# Patient Record
Sex: FEMALE | Race: OTHER | Employment: OTHER | URBAN - METROPOLITAN AREA
[De-identification: names, ages, dates, MRNs, and addresses within clinical notes are randomized per-mention and may not be internally consistent; named-entity substitution may affect disease eponyms.]

---

## 2017-01-11 ENCOUNTER — LAB ENCOUNTER (OUTPATIENT)
Dept: LAB | Age: 67
End: 2017-01-11
Attending: INTERNAL MEDICINE
Payer: MEDICARE

## 2017-01-11 DIAGNOSIS — Z00.00 ENCOUNTER FOR ANNUAL HEALTH EXAMINATION: ICD-10-CM

## 2017-01-11 LAB
ALBUMIN SERPL-MCNC: 3.5 G/DL (ref 3.5–4.8)
ALP LIVER SERPL-CCNC: 102 U/L (ref 50–130)
ALT SERPL-CCNC: 22 U/L (ref 14–54)
AST SERPL-CCNC: 17 U/L (ref 15–41)
BASOPHILS # BLD AUTO: 0.02 X10(3) UL (ref 0–0.1)
BASOPHILS NFR BLD AUTO: 0.3 %
BILIRUB SERPL-MCNC: 0.4 MG/DL (ref 0.1–2)
BUN BLD-MCNC: 18 MG/DL (ref 8–20)
CALCIUM BLD-MCNC: 9 MG/DL (ref 8.3–10.3)
CHLORIDE: 105 MMOL/L (ref 101–111)
CHOLEST SMN-MCNC: 220 MG/DL (ref ?–200)
CO2: 29 MMOL/L (ref 22–32)
CREAT BLD-MCNC: 0.84 MG/DL (ref 0.55–1.02)
EOSINOPHIL # BLD AUTO: 0.4 X10(3) UL (ref 0–0.3)
EOSINOPHIL NFR BLD AUTO: 5.1 %
ERYTHROCYTE [DISTWIDTH] IN BLOOD BY AUTOMATED COUNT: 14.3 % (ref 11.5–16)
EST. AVERAGE GLUCOSE BLD GHB EST-MCNC: 137 MG/DL (ref 68–126)
GLUCOSE BLD-MCNC: 107 MG/DL (ref 70–99)
HBA1C MFR BLD HPLC: 6.4 % (ref ?–5.7)
HCT VFR BLD AUTO: 38.7 % (ref 34–50)
HDLC SERPL-MCNC: 72 MG/DL (ref 45–?)
HDLC SERPL: 3.06 {RATIO} (ref ?–4.44)
HGB BLD-MCNC: 12.4 G/DL (ref 12–16)
IMMATURE GRANULOCYTE COUNT: 0.02 X10(3) UL (ref 0–1)
IMMATURE GRANULOCYTE RATIO %: 0.3 %
LDLC SERPL CALC-MCNC: 126 MG/DL (ref ?–130)
LYMPHOCYTES # BLD AUTO: 2.89 X10(3) UL (ref 0.9–4)
LYMPHOCYTES NFR BLD AUTO: 37.1 %
M PROTEIN MFR SERPL ELPH: 7.9 G/DL (ref 6.1–8.3)
MCH RBC QN AUTO: 28.2 PG (ref 27–33.2)
MCHC RBC AUTO-ENTMCNC: 32 G/DL (ref 31–37)
MCV RBC AUTO: 88 FL (ref 81–100)
MONOCYTES # BLD AUTO: 0.46 X10(3) UL (ref 0.1–0.6)
MONOCYTES NFR BLD AUTO: 5.9 %
NEUTROPHIL ABS PRELIM: 3.99 X10 (3) UL (ref 1.3–6.7)
NEUTROPHILS # BLD AUTO: 3.99 X10(3) UL (ref 1.3–6.7)
NEUTROPHILS NFR BLD AUTO: 51.3 %
NONHDLC SERPL-MCNC: 148 MG/DL (ref ?–130)
PLATELET # BLD AUTO: 245 10(3)UL (ref 150–450)
POTASSIUM SERPL-SCNC: 4.2 MMOL/L (ref 3.6–5.1)
RBC # BLD AUTO: 4.4 X10(6)UL (ref 3.8–5.1)
RED CELL DISTRIBUTION WIDTH-SD: 46.5 FL (ref 35.1–46.3)
SODIUM SERPL-SCNC: 139 MMOL/L (ref 136–144)
TRIGLYCERIDES: 110 MG/DL (ref ?–150)
TSI SER-ACNC: 3.75 MIU/ML (ref 0.35–5.5)
VLDL: 22 MG/DL (ref 5–40)
WBC # BLD AUTO: 7.8 X10(3) UL (ref 4–13)

## 2017-01-11 PROCEDURE — 84443 ASSAY THYROID STIM HORMONE: CPT | Performed by: INTERNAL MEDICINE

## 2017-01-11 PROCEDURE — 85025 COMPLETE CBC W/AUTO DIFF WBC: CPT | Performed by: INTERNAL MEDICINE

## 2017-01-11 PROCEDURE — 80061 LIPID PANEL: CPT | Performed by: INTERNAL MEDICINE

## 2017-01-11 PROCEDURE — 83036 HEMOGLOBIN GLYCOSYLATED A1C: CPT | Performed by: INTERNAL MEDICINE

## 2017-01-11 PROCEDURE — 36415 COLL VENOUS BLD VENIPUNCTURE: CPT | Performed by: INTERNAL MEDICINE

## 2017-01-11 PROCEDURE — 80053 COMPREHEN METABOLIC PANEL: CPT | Performed by: INTERNAL MEDICINE

## 2017-01-23 ENCOUNTER — OFFICE VISIT (OUTPATIENT)
Dept: INTERNAL MEDICINE CLINIC | Facility: CLINIC | Age: 67
End: 2017-01-23

## 2017-01-23 VITALS
OXYGEN SATURATION: 97 % | WEIGHT: 177.5 LBS | HEIGHT: 61 IN | TEMPERATURE: 98 F | DIASTOLIC BLOOD PRESSURE: 80 MMHG | BODY MASS INDEX: 33.51 KG/M2 | RESPIRATION RATE: 17 BRPM | SYSTOLIC BLOOD PRESSURE: 122 MMHG | HEART RATE: 64 BPM

## 2017-01-23 DIAGNOSIS — M17.0 PRIMARY LOCALIZED OSTEOARTHRITIS OF BOTH KNEES: ICD-10-CM

## 2017-01-23 DIAGNOSIS — E66.09 NON MORBID OBESITY DUE TO EXCESS CALORIES: ICD-10-CM

## 2017-01-23 DIAGNOSIS — Z00.00 ENCOUNTER FOR ANNUAL HEALTH EXAMINATION: ICD-10-CM

## 2017-01-23 DIAGNOSIS — M72.2 PLANTAR FASCIITIS: ICD-10-CM

## 2017-01-23 DIAGNOSIS — N39.3 URINARY, INCONTINENCE, STRESS FEMALE: ICD-10-CM

## 2017-01-23 DIAGNOSIS — Z85.3 HISTORY OF BREAST CANCER: ICD-10-CM

## 2017-01-23 DIAGNOSIS — R06.00 DYSPNEA ON EXERTION: ICD-10-CM

## 2017-01-23 DIAGNOSIS — R73.03 PRE-DIABETES: ICD-10-CM

## 2017-01-23 DIAGNOSIS — R73.01 IMPAIRED FASTING BLOOD SUGAR: Primary | ICD-10-CM

## 2017-01-23 DIAGNOSIS — R07.9 EXERTIONAL CHEST PAIN: ICD-10-CM

## 2017-01-23 PROCEDURE — 96160 PT-FOCUSED HLTH RISK ASSMT: CPT | Performed by: INTERNAL MEDICINE

## 2017-01-23 PROCEDURE — 88175 CYTOPATH C/V AUTO FLUID REDO: CPT | Performed by: INTERNAL MEDICINE

## 2017-01-23 PROCEDURE — 87624 HPV HI-RISK TYP POOLED RSLT: CPT | Performed by: INTERNAL MEDICINE

## 2017-01-23 NOTE — PATIENT INSTRUCTIONS
Recommended Websites for Advanced Directives    SeekAlumni.no. org/publications/Documents/personal_dec. pdf  An information packet, including necessary form from the [employer] website. http://www. [place].state. [place]/public/books/adv and escalators. ? Cover porch steps with gritty weather proof paint. ? Pay attention to curbs and other changes in surfaces when out in the community. ? Take care when walking on gravel or grassy surfaces. ? Avoid walking on snowy or icy surfaces. ?  U

## 2017-01-23 NOTE — PROGRESS NOTES
HPI:   Caio Guallpa is a 73year old female who presents for a medicare supervisit and additional issues noted below. Accompanied by . 1. Dyspnea on Exertion: she feels it is a bit better.  She is walking on treadmill daily for 18 mi outpatient prescriptions have been marked as taking for the 1/23/17 encounter (Office Visit) with Brett Wilkerson MD.   MEDICAL INFORMATION:   She  has a past medical history of Breast cancer in female [facility]) ([facility]) and [facility]     She  has pas throat is clear  NECK: supple, no jvd, no thyromegaly  LUNGS: clear to auscultation bilateraly, no c/w/r  CARDIO: RRR without g/m/r  GI: difficult exam 2/2 obesity, non tender, softly distended, no HSM, normal BS throughout  NEURO: CN 2-12 grossly intact state?: Fair    How do you maintain positive mental well-being?: Visiting Friends; Visiting Family    If you are a male age 38-65 or a female age 47-67, do you take aspirin?: No    Have you had any immunizations at another office such as Influenza, Hepatiti appetite or overeating: Not at all    Feeling bad about yourself - or that you are a failure or have let yourself or your family down: Not at all    Trouble concentrating on things, such as reading the newspaper or watching television: Not at all    Moving Screening      Ophthalmology Visit Annually: Diabetics, FHx Glaucoma, AA>50, > 65 No flowsheet data found.     Bone Density Screening      Dexascan Every two years Last Dexa Scan:   XR DEXA BONE DENSITOMETRY (CPT=77080) 10/04/2016    No flowsheet da Date Value   01/11/2017 126    No flowsheet data found. Dilated Eye exam  Annually No flowsheet data found. No flowsheet data found. COPD      Spirometry Testing Annually Spirometry date: 12/27/2016 No flowsheet data found.          ASSESSMENT/HERMINIO gets annual flu shot. prevnar 13 in 9/2016 Pneumovac 23 in 1 year. Advised on shingles and TDAP  Healthcare Living Will: counseled and resources given.    Medical POA: , Mansi Wilde.  Brother, Collins Martins    The patient indicates understanding

## 2017-01-27 LAB — HPV I/H RISK 1 DNA SPEC QL NAA+PROBE: NEGATIVE

## 2017-09-06 ENCOUNTER — HOSPITAL ENCOUNTER (OUTPATIENT)
Dept: MAMMOGRAPHY | Age: 67
Discharge: HOME | End: 2017-09-06
Attending: INTERNAL MEDICINE
Payer: MEDICARE

## 2017-09-06 ENCOUNTER — HOSPITAL ENCOUNTER (OUTPATIENT)
Dept: GENERAL RADIOLOGY | Age: 67
Discharge: HOME | End: 2017-09-06
Attending: INTERNAL MEDICINE
Payer: MEDICARE

## 2017-09-06 ENCOUNTER — APPOINTMENT (OUTPATIENT)
Dept: LAB | Age: 67
End: 2017-09-06
Attending: INTERNAL MEDICINE
Payer: MEDICARE

## 2017-09-06 ENCOUNTER — OFFICE VISIT (OUTPATIENT)
Dept: INTERNAL MEDICINE CLINIC | Facility: CLINIC | Age: 67
End: 2017-09-06

## 2017-09-06 VITALS
HEART RATE: 50 BPM | OXYGEN SATURATION: 99 % | TEMPERATURE: 98 F | RESPIRATION RATE: 16 BRPM | DIASTOLIC BLOOD PRESSURE: 74 MMHG | SYSTOLIC BLOOD PRESSURE: 106 MMHG | HEIGHT: 61 IN | WEIGHT: 177 LBS | BODY MASS INDEX: 33.42 KG/M2

## 2017-09-06 DIAGNOSIS — M17.0 PRIMARY OSTEOARTHRITIS OF BOTH KNEES: ICD-10-CM

## 2017-09-06 DIAGNOSIS — Z12.39 SCREENING BREAST EXAMINATION: ICD-10-CM

## 2017-09-06 DIAGNOSIS — C50.812 MALIGNANT NEOPLASM OF OVERLAPPING SITES OF LEFT BREAST IN FEMALE, ESTROGEN RECEPTOR POSITIVE (HCC): ICD-10-CM

## 2017-09-06 DIAGNOSIS — Z17.0 MALIGNANT NEOPLASM OF OVERLAPPING SITES OF LEFT BREAST IN FEMALE, ESTROGEN RECEPTOR POSITIVE (HCC): ICD-10-CM

## 2017-09-06 DIAGNOSIS — M72.2 PLANTAR FASCIITIS: ICD-10-CM

## 2017-09-06 DIAGNOSIS — E66.09 CLASS 1 OBESITY DUE TO EXCESS CALORIES WITH SERIOUS COMORBIDITY AND BODY MASS INDEX (BMI) OF 33.0 TO 33.9 IN ADULT: ICD-10-CM

## 2017-09-06 DIAGNOSIS — M72.2 PLANTAR FASCIITIS OF RIGHT FOOT: ICD-10-CM

## 2017-09-06 DIAGNOSIS — M77.01 MEDIAL EPICONDYLITIS OF RIGHT ELBOW: ICD-10-CM

## 2017-09-06 DIAGNOSIS — R73.01 IMPAIRED FASTING BLOOD SUGAR: ICD-10-CM

## 2017-09-06 DIAGNOSIS — R73.01 IMPAIRED FASTING BLOOD SUGAR: Primary | ICD-10-CM

## 2017-09-06 PROBLEM — R73.03 PRE-DIABETES: Status: RESOLVED | Noted: 2017-01-23 | Resolved: 2017-09-06

## 2017-09-06 LAB
EST. AVERAGE GLUCOSE BLD GHB EST-MCNC: 143 MG/DL (ref 68–126)
HBA1C MFR BLD HPLC: 6.6 % (ref ?–5.7)

## 2017-09-06 PROCEDURE — 36415 COLL VENOUS BLD VENIPUNCTURE: CPT

## 2017-09-06 PROCEDURE — 99214 OFFICE O/P EST MOD 30 MIN: CPT | Performed by: INTERNAL MEDICINE

## 2017-09-06 PROCEDURE — 77067 SCR MAMMO BI INCL CAD: CPT | Performed by: INTERNAL MEDICINE

## 2017-09-06 PROCEDURE — 83036 HEMOGLOBIN GLYCOSYLATED A1C: CPT

## 2017-09-06 PROCEDURE — 73565 X-RAY EXAM OF KNEES: CPT | Performed by: INTERNAL MEDICINE

## 2017-09-06 RX ORDER — RANITIDINE 150 MG/1
150 TABLET ORAL NIGHTLY
COMMUNITY
End: 2018-11-14

## 2017-09-06 NOTE — PROGRESS NOTES
Jerardo Melvin is a 78year old female. HPI:   Patient presents for the following. Comes in with . 1. Generalized Pain: pain is most severe in both knees and in her right elbow. worsenign over past 2 months.  Naproxen and ibuprofen help s/p chemo and radiation      Past Surgical History:  1990: CHEMOTHERAPY Left      Comment: 6 months  1990: LUMPECTOMY LEFT Left      Comment: breast cancer with chemo/radiation  1990: LUMPECTOMY LEFT  1990: NEEDLE BIOPSY LEFT Left  1990: RADIATION LEFT to seeing ortho foot/ankle. However, I do not have any further conservative therapies to offer her and I explained importance of staying active and losing weight.    # Obese, BMI 33, Impaired Fasting Glucose: weight loss is paramount to improving all the ab

## 2017-09-06 NOTE — PATIENT INSTRUCTIONS
Please do not exceed 3000 mg (3 grams) of tylenol in 24 hours. It is very important you look at the amount of tylenol (acetaminophen) in any of your over the counter medications to ensure you do not exceed a safe amount of tylenol per day.

## 2017-09-13 ENCOUNTER — OFFICE VISIT (OUTPATIENT)
Dept: INTERNAL MEDICINE CLINIC | Facility: CLINIC | Age: 67
End: 2017-09-13

## 2017-09-13 VITALS
BODY MASS INDEX: 32.94 KG/M2 | SYSTOLIC BLOOD PRESSURE: 120 MMHG | HEIGHT: 61 IN | RESPIRATION RATE: 18 BRPM | WEIGHT: 174.5 LBS | HEART RATE: 74 BPM | TEMPERATURE: 98 F | DIASTOLIC BLOOD PRESSURE: 68 MMHG

## 2017-09-13 DIAGNOSIS — M17.0 PRIMARY LOCALIZED OSTEOARTHRITIS OF BOTH KNEES: ICD-10-CM

## 2017-09-13 DIAGNOSIS — Z13.5 SCREENING FOR DIABETIC RETINOPATHY: ICD-10-CM

## 2017-09-13 DIAGNOSIS — E66.09 CLASS 1 OBESITY DUE TO EXCESS CALORIES WITH SERIOUS COMORBIDITY AND BODY MASS INDEX (BMI) OF 32.0 TO 32.9 IN ADULT: ICD-10-CM

## 2017-09-13 DIAGNOSIS — E11.9 TYPE 2 DIABETES MELLITUS WITHOUT COMPLICATION, WITHOUT LONG-TERM CURRENT USE OF INSULIN (HCC): Primary | ICD-10-CM

## 2017-09-13 PROBLEM — R73.01 IMPAIRED FASTING BLOOD SUGAR: Status: RESOLVED | Noted: 2017-01-23 | Resolved: 2017-09-13

## 2017-09-13 PROCEDURE — 99214 OFFICE O/P EST MOD 30 MIN: CPT | Performed by: INTERNAL MEDICINE

## 2017-09-13 NOTE — PROGRESS NOTES
Eugenio Hess is a 78year old female. HPI:   Patient presents for new diagnosis of DM. Comes in alone. 1. GERD: infrequent symptoms. Uses ranitidine prn.  2. DM: new diagnosis. Her diet is not high in processed carbs.  However, she does not e Pulse 74   Temp 98.2 °F (36.8 °C) (Oral)   Resp 18   Ht 61\"   Wt 174 lb 8 oz   BMI 32.97 kg/m²   GENERAL: A&O well developed, well nourished, obese, in no apparent distress  SKIN: no rashes,no suspicious lesions  HEENT: atraumatic, MMM, throat is clear However, I do not have any further conservative therapies to offer her and I explained importance of staying active and losing weight. # Obese, BMI 33, Impaired Fasting Glucose: weight loss is paramount to improving all the above conditions.  Discussed nu

## 2017-09-13 NOTE — PATIENT INSTRUCTIONS
Goals for Weight Loss:  1. Please try to eat breakfast every morning. 2. Small, frequent meals improves your metabolism and promotes weight loss  3. Your portions should be a dinner plate.  1/2 should be vegetables, 1/4 lean protein (chicken, fish, turkey) high cholesterol. · Blood pressure. When blood pressure is high all the time it causes your heart to work harder to pump blood. Artery walls become damaged. This increases the risk for plaque build up.   · Lipids. The body needs some lipids in the blood to under control. If you smoke and need help quitting, talk to your healthcare team.   · Testing your blood sugar is the only way to know whether it is under control. Be sure to test your blood sugar yourself.  Also get your blood tested in the lab, as anjana sugar.  Carbohydrates  Carbohydrates are the main source of fuel for the body. Carbohydrates raise blood sugar. Many people think carbohydrates are only found in pasta or bread.  But carbohydrates are actually in many kinds of foods:  · Sugars occur natural found in animal products, such as meat, poultry, whole milk, lard, and butter. Saturated fats raise LDL cholesterol and are not healthy for your heart. · Hydrogenated oils and trans fats are formed when vegetable oils are processed into solid fats.  They a advice.     Guidelines for success  Talk with your healthcare provider before starting a diabetes diet or weight loss program. If you haven't talked with a dietitian yet, ask your provider for a referral. The following guidelines can help you succeed:  · Se American Diabetes Association [phone] www. diabetes. org  Date Last Reviewed: 8/1/2016  © 4789-9275 [address], [address] [place] [address]. All rights reserved.  This information is not intended as a substitute for kathryn exactly as told to. · Watch your blood sugar as you are told to. Keep a log of your results. This will help your provider change your medicines to keep your blood sugar under control. · Try to reach your ideal weight.  You may be able to cut back on or no results. · Check your blood sugar every 2 to 4 hours, or at least 4 times a day. · Check your ketones often. If you are vomiting and having diarrhea, watch them more often. · Do not skip meals. Try to eat small meals on a regular schedule.  Do this even of these signs of low blood sugar:  · Fatigue  · Headache  · Shakes  · Excess sweating  · Hunger  · Feeling anxious or restless  · Eyesight changes  · Drowsiness  · Weakness  · Confusion or loss of consciousness  Call 911  Call for emergency help right patricia meal, depending on your situation.   Here are some examples of foods containing about 15 grams of carbohydrates (1 serving of carbohydrates):  · 1/2 cup of canned or frozen fruit  · A small piece of fresh fruit (4 ounces)  · 1 slice of bread  · 1/2 cup of seconds. · Learn to read food labels. Be sure to look at serving size, total carbohydrates, fiber, calories, sugar, and saturated and trans fats. Look for healthier alternatives to foods that have added sugar.   · Plan ahead for parties so you can still ha Last Reviewed: 7/1/2016  © 1794-0245 The [address] [place] [address], [address] [place], [place] [address]. All rights reserved. This information is not intended as a substitute for professional medical care.  Always follow your healthcare professional's ins tracking your blood sugar. You can also eat healthy and exercise to avoid gaining weight. And you should take medicine if directed by your healthcare provider.   Date Last Reviewed: 5/1/2016  © 1372-6703 [address] Gissel Conley whole grains, and low or no-fat dairy products will help control your blood sugar.   Be physically active  Being active helps your body use insulin to turn food into energy.   Ask your healthcare provider to work with you to create an activity program that' you have diabetes, you may be more likely to develop other health problems. They include foot, eye, heart, and kidney problems. By controlling your blood sugar, and taking good care of yourself, you can help to prevent these problems.  Your healthcare provi yourself. Or look for a diabetes support group locally or on the Internet. (Check the Connect with Others on www. diabetes. org.)  · Counseling. Talk with a , psychologist, psychiatrist, or other counselor. · Information.  Contact the American D different medicines. Sample results  Most people aim for an A1c lower than 7%. That’s an eAG less than 154 mg/dL. Or, your healthcare provider may want you to aim for an A1C of 6%.  That’s an eAG of 126 mg/dL.     Glucose calculator  Visit http://professio protein  · Carbohydrates are starches, sugars, and fiber. They are found in many foods, including fruit, bread, pasta, milk, and sweets. Of all the foods you eat, carbohydrates have the most effect on your blood sugar.  Your dietitian may teach you about ca medical care. Always follow your healthcare professional's instructions. Oral Medicines for Type 2 Diabetes  Diabetes pills can help to manage your blood sugar. These pills are not insulin. They work to manage your blood sugar in several ways.  You m bones  Meglitinides  These pills increase your insulin for a short period of time. They are usually taken before meals.  Possible side effects include:  · Low blood sugar  · Diarrhea  · Headache   · Slightly increased risk for heart problems  DPP-4 inhibito substitute for professional medical care. Always follow your healthcare professional's instructions.

## 2017-09-14 ENCOUNTER — TELEPHONE (OUTPATIENT)
Dept: INTERNAL MEDICINE CLINIC | Facility: CLINIC | Age: 67
End: 2017-09-14

## 2017-09-14 DIAGNOSIS — E66.09 CLASS 1 OBESITY DUE TO EXCESS CALORIES WITH SERIOUS COMORBIDITY AND BODY MASS INDEX (BMI) OF 32.0 TO 32.9 IN ADULT: ICD-10-CM

## 2017-09-14 DIAGNOSIS — E11.9 TYPE 2 DIABETES MELLITUS WITHOUT COMPLICATION, WITHOUT LONG-TERM CURRENT USE OF INSULIN (HCC): ICD-10-CM

## 2017-09-14 DIAGNOSIS — Z13.5 SCREENING FOR DIABETIC RETINOPATHY: ICD-10-CM

## 2017-09-14 NOTE — TELEPHONE ENCOUNTER
Patient informed cancelled @ [facility], resent to [facility]

## 2017-09-14 NOTE — TELEPHONE ENCOUNTER
Patient called to request medication MetFORMIN HCl 500 MG be sent to the [address] [place] [address].

## 2017-09-20 ENCOUNTER — OFFICE VISIT (OUTPATIENT)
Dept: PHYSICAL THERAPY | Age: 67
End: 2017-09-20
Attending: INTERNAL MEDICINE
Payer: MEDICARE

## 2017-09-20 DIAGNOSIS — M77.01 MEDIAL EPICONDYLITIS OF RIGHT ELBOW: ICD-10-CM

## 2017-09-20 DIAGNOSIS — R73.01 IMPAIRED FASTING BLOOD SUGAR: ICD-10-CM

## 2017-09-20 DIAGNOSIS — E66.09 CLASS 1 OBESITY DUE TO EXCESS CALORIES WITH SERIOUS COMORBIDITY AND BODY MASS INDEX (BMI) OF 33.0 TO 33.9 IN ADULT: ICD-10-CM

## 2017-09-20 DIAGNOSIS — M17.0 PRIMARY OSTEOARTHRITIS OF BOTH KNEES: ICD-10-CM

## 2017-09-20 DIAGNOSIS — M72.2 PLANTAR FASCIITIS OF RIGHT FOOT: ICD-10-CM

## 2017-09-20 PROCEDURE — 97110 THERAPEUTIC EXERCISES: CPT

## 2017-09-20 PROCEDURE — 97162 PT EVAL MOD COMPLEX 30 MIN: CPT

## 2017-09-20 NOTE — PROGRESS NOTES
LOWER EXTREMITY EVALUATION:   Referring Physician: Dr. Sergio Quesada  Diagnosis: R foot pain, Bilateral knee OA, Left elbow medial epicondylitis   Date of Service: 9/20/2017     PATIENT SUMMARY   Babs Peters is a 78year old y/o female who presents to t Decreased right calcaneus motion.     Flexibility:  Hip Flexor: moderate tightness  Hamstrings: 70 deg  Piriformis: moderate tightness  Quads: moderate tightness  Gastroc-soleus: moderate tightness    Strength/MMT:   Hip Knee Foot/Ankle   Flexion: R 4/5; L CK    Patient/Family/Caregiver was advised of these findings, precautions, and treatment options and has agreed to actively participate in planning and for this course of care.     Thank you for your referral. Please co-sign or sign and return this letter v

## 2017-09-22 ENCOUNTER — OFFICE VISIT (OUTPATIENT)
Dept: PHYSICAL THERAPY | Age: 67
End: 2017-09-22
Attending: INTERNAL MEDICINE
Payer: MEDICARE

## 2017-09-22 PROCEDURE — 97110 THERAPEUTIC EXERCISES: CPT

## 2017-09-22 PROCEDURE — 97140 MANUAL THERAPY 1/> REGIONS: CPT

## 2017-09-22 PROCEDURE — 97035 APP MDLTY 1+ULTRASOUND EA 15: CPT

## 2017-09-22 NOTE — PROGRESS NOTES
Dx: R foot pain, Bilateral knee OA, R elbow pain         Authorized # of Visits:  8         Next MD visit: none scheduled  Fall Risk: standard         Precautions: n/a             Subjective: Pain R foot 7/10 with standing and walking, bilateral knee pain stretch with passive ankle DF R x 10         Supine heel slides R/L x 10  SLR R/L x 10         Side lying clam R/L x 10  Hip abd R/L x 10         Hook lying bridging x 10         Seated plantar fascia massage using foot roller R 2 mins         Seated LAQ w

## 2017-09-25 ENCOUNTER — OFFICE VISIT (OUTPATIENT)
Dept: PHYSICAL THERAPY | Age: 67
End: 2017-09-25
Attending: INTERNAL MEDICINE
Payer: MEDICARE

## 2017-09-25 PROCEDURE — 97110 THERAPEUTIC EXERCISES: CPT

## 2017-09-25 PROCEDURE — 97035 APP MDLTY 1+ULTRASOUND EA 15: CPT

## 2017-09-25 NOTE — PROGRESS NOTES
Dx: R foot pain, Bilateral knee OA, R elbow pain         Authorized # of Visits:  8         Next MD visit: none scheduled  Fall Risk: standard         Precautions: n/a             Subjective: Pain R foot 7/10 with standing and walking, bilateral knee pain Seated plantar fascia massage using foot roller 2 mins        Supine hamstring stretch with passive ankle DF R x 10 Supine heel slides R/L x 10 each        Supine heel slides R/L x 10  SLR R/L x 10 Supine SLR R/L x 10        Side lying clam R/L x 10  Hip a

## 2017-09-28 ENCOUNTER — OFFICE VISIT (OUTPATIENT)
Dept: PHYSICAL THERAPY | Age: 67
End: 2017-09-28
Attending: INTERNAL MEDICINE
Payer: MEDICARE

## 2017-09-28 PROCEDURE — 97140 MANUAL THERAPY 1/> REGIONS: CPT

## 2017-09-28 PROCEDURE — 97110 THERAPEUTIC EXERCISES: CPT

## 2017-09-28 PROCEDURE — 97035 APP MDLTY 1+ULTRASOUND EA 15: CPT

## 2017-09-28 NOTE — PROGRESS NOTES
Dx: R foot pain, Bilateral knee OA, R elbow pain         Authorized # of Visits:  8         Next MD visit: none scheduled  Fall Risk: standard         Precautions: n/a             Subjective: Pain R foot 7/10 with standing and walking, bilateral knee pain fascia massage using foot roller 2 mins       Supine heel slides R/L x 10  SLR R/L x 10 Supine SLR R/L x 10 Seated passive stretches R heel IV, EV, ankle DF with gt toe ext 3 x 10 secs each       Side lying clam R/L x 10  Hip abd R/L x 10 Side lying clam R

## 2017-10-03 ENCOUNTER — OFFICE VISIT (OUTPATIENT)
Dept: PHYSICAL THERAPY | Age: 67
End: 2017-10-03
Attending: INTERNAL MEDICINE
Payer: MEDICARE

## 2017-10-03 PROCEDURE — 97140 MANUAL THERAPY 1/> REGIONS: CPT

## 2017-10-03 PROCEDURE — 97110 THERAPEUTIC EXERCISES: CPT

## 2017-10-03 PROCEDURE — 97035 APP MDLTY 1+ULTRASOUND EA 15: CPT

## 2017-10-05 ENCOUNTER — OFFICE VISIT (OUTPATIENT)
Dept: PHYSICAL THERAPY | Age: 67
End: 2017-10-05
Attending: INTERNAL MEDICINE
Payer: MEDICARE

## 2017-10-05 PROCEDURE — 97035 APP MDLTY 1+ULTRASOUND EA 15: CPT

## 2017-10-05 PROCEDURE — 97110 THERAPEUTIC EXERCISES: CPT

## 2017-10-05 PROCEDURE — 97140 MANUAL THERAPY 1/> REGIONS: CPT

## 2017-10-05 NOTE — PROGRESS NOTES
Dx: R foot pain, Bilateral knee OA, R elbow pain         Authorized # of Visits:  8         Next MD visit: none scheduled  Fall Risk: standard         Precautions: n/a             Subjective:  Foot pain and elbow pain are a little better, knee pain is still Seated blue t band R ankle PF, DF, IV, EV x 15 each US plantar fascia R foot 6 mins 1 MHz, 0.8 Wcm2     Supine hamstring stretch with passive ankle DF R x 10 Supine heel slides R/L x 10 each Seated plantar fascia massage using foot roller 2 mins Supine mimi common extensor tendon and distal humerus 5 mins Supine hamstring stretch R/L 30 sec hold     Seated R elbow flex, ext, pronation, supination x 10 each US R elbow 3 MHz, pulsed 50% 0.8 Wcm2 7 mins Passive wrist extensor muscle stretching 3 x 10 sec hold US

## 2017-10-10 ENCOUNTER — OFFICE VISIT (OUTPATIENT)
Dept: PHYSICAL THERAPY | Age: 67
End: 2017-10-10
Attending: INTERNAL MEDICINE
Payer: MEDICARE

## 2017-10-10 PROCEDURE — 97110 THERAPEUTIC EXERCISES: CPT

## 2017-10-10 PROCEDURE — 97140 MANUAL THERAPY 1/> REGIONS: CPT

## 2017-10-10 PROCEDURE — 97035 APP MDLTY 1+ULTRASOUND EA 15: CPT

## 2017-10-10 NOTE — PROGRESS NOTES
Dx: R foot pain, Bilateral knee OA, R elbow pain         Authorized # of Visits:  8         Next MD visit: none scheduled  Fall Risk: standard         Precautions: n/a             Subjective:  Foot pain and elbow pain are a little better, knee pain is still using foot roller 2 mins Seated green t band R ankle PF, DF, IV, EV x 20 each Seated blue t band R ankle PF, DF, IV, EV x 15 each US plantar fascia R foot 6 mins 1 MHz, 0.8 Wcm2 Hook lying bridging x 10  Bent leg lift R/L x 10    Supine hamstring stretch w band R/L x 20 Standing calf stretches R 3 x 20 sec hold Seated with forearm supported with 1# wt R wrist ext, radial deviation, pro/supination x 10 each Side lying clam R/L x 20 Supine passive stretching plantarfascia and calf 3 x 20 sec hold    Standing t

## 2018-01-16 ENCOUNTER — TELEPHONE (OUTPATIENT)
Dept: INTERNAL MEDICINE CLINIC | Facility: CLINIC | Age: 68
End: 2018-01-16

## 2018-01-16 NOTE — TELEPHONE ENCOUNTER
Patient called for an appointment with Dr. Valerie Blanco; with availability offered January 30, 2018 1st available for her with visit type needed. Explained that Dr. Willingham Arts schedule was full until that time.   She then said she will be leaving for [place] on 1/24/

## 2018-03-26 ENCOUNTER — OFFICE VISIT (OUTPATIENT)
Dept: INTERNAL MEDICINE CLINIC | Facility: CLINIC | Age: 68
End: 2018-03-26

## 2018-03-26 ENCOUNTER — APPOINTMENT (OUTPATIENT)
Dept: LAB | Age: 68
End: 2018-03-26
Attending: INTERNAL MEDICINE
Payer: MEDICARE

## 2018-03-26 VITALS
TEMPERATURE: 98 F | BODY MASS INDEX: 33.13 KG/M2 | OXYGEN SATURATION: 95 % | RESPIRATION RATE: 18 BRPM | HEART RATE: 63 BPM | HEIGHT: 60.25 IN | SYSTOLIC BLOOD PRESSURE: 132 MMHG | WEIGHT: 171 LBS | DIASTOLIC BLOOD PRESSURE: 80 MMHG

## 2018-03-26 DIAGNOSIS — M54.42 ACUTE MIDLINE LOW BACK PAIN WITH BILATERAL SCIATICA: ICD-10-CM

## 2018-03-26 DIAGNOSIS — E11.9 TYPE 2 DIABETES MELLITUS WITHOUT COMPLICATION, WITHOUT LONG-TERM CURRENT USE OF INSULIN (HCC): ICD-10-CM

## 2018-03-26 DIAGNOSIS — E66.09 CLASS 1 OBESITY DUE TO EXCESS CALORIES WITH SERIOUS COMORBIDITY AND BODY MASS INDEX (BMI) OF 32.0 TO 32.9 IN ADULT: ICD-10-CM

## 2018-03-26 DIAGNOSIS — Z13.5 SCREENING FOR DIABETIC RETINOPATHY: ICD-10-CM

## 2018-03-26 DIAGNOSIS — C50.812 MALIGNANT NEOPLASM OF OVERLAPPING SITES OF LEFT BREAST IN FEMALE, ESTROGEN RECEPTOR POSITIVE (HCC): ICD-10-CM

## 2018-03-26 DIAGNOSIS — M17.0 PRIMARY OSTEOARTHRITIS OF BOTH KNEES: ICD-10-CM

## 2018-03-26 DIAGNOSIS — Z12.39 SCREENING BREAST EXAMINATION: ICD-10-CM

## 2018-03-26 DIAGNOSIS — Z17.0 MALIGNANT NEOPLASM OF OVERLAPPING SITES OF LEFT BREAST IN FEMALE, ESTROGEN RECEPTOR POSITIVE (HCC): ICD-10-CM

## 2018-03-26 DIAGNOSIS — Z12.11 SCREEN FOR COLON CANCER: ICD-10-CM

## 2018-03-26 DIAGNOSIS — Z00.00 ROUTINE GENERAL MEDICAL EXAMINATION AT A HEALTH CARE FACILITY: Primary | ICD-10-CM

## 2018-03-26 DIAGNOSIS — Z00.00 ROUTINE GENERAL MEDICAL EXAMINATION AT A HEALTH CARE FACILITY: ICD-10-CM

## 2018-03-26 DIAGNOSIS — M54.41 ACUTE MIDLINE LOW BACK PAIN WITH BILATERAL SCIATICA: ICD-10-CM

## 2018-03-26 LAB
BUN BLD-MCNC: 15 MG/DL (ref 8–20)
CALCIUM BLD-MCNC: 9.3 MG/DL (ref 8.3–10.3)
CHLORIDE: 104 MMOL/L (ref 101–111)
CHOLEST SMN-MCNC: 220 MG/DL (ref ?–200)
CO2: 24 MMOL/L (ref 22–32)
CREAT BLD-MCNC: 0.8 MG/DL (ref 0.55–1.02)
CREAT UR-SCNC: 149 MG/DL
EST. AVERAGE GLUCOSE BLD GHB EST-MCNC: 131 MG/DL (ref 68–126)
GLUCOSE BLD-MCNC: 83 MG/DL (ref 70–99)
HBA1C MFR BLD HPLC: 6.2 % (ref ?–5.7)
HDLC SERPL-MCNC: 68 MG/DL (ref 45–?)
HDLC SERPL: 3.24 {RATIO} (ref ?–4.44)
LDLC SERPL CALC-MCNC: 120 MG/DL (ref ?–130)
MICROALBUMIN UR-MCNC: 1.01 MG/DL
MICROALBUMIN/CREAT 24H UR-RTO: 6.8 UG/MG (ref ?–30)
NONHDLC SERPL-MCNC: 152 MG/DL (ref ?–130)
POTASSIUM SERPL-SCNC: 4.7 MMOL/L (ref 3.6–5.1)
SODIUM SERPL-SCNC: 137 MMOL/L (ref 136–144)
TRIGL SERPL-MCNC: 159 MG/DL (ref ?–150)
VLDLC SERPL CALC-MCNC: 32 MG/DL (ref 5–40)

## 2018-03-26 PROCEDURE — 82043 UR ALBUMIN QUANTITATIVE: CPT

## 2018-03-26 PROCEDURE — 82570 ASSAY OF URINE CREATININE: CPT

## 2018-03-26 PROCEDURE — 36415 COLL VENOUS BLD VENIPUNCTURE: CPT

## 2018-03-26 PROCEDURE — 83036 HEMOGLOBIN GLYCOSYLATED A1C: CPT

## 2018-03-26 PROCEDURE — 96160 PT-FOCUSED HLTH RISK ASSMT: CPT | Performed by: INTERNAL MEDICINE

## 2018-03-26 PROCEDURE — 80048 BASIC METABOLIC PNL TOTAL CA: CPT

## 2018-03-26 PROCEDURE — 80061 LIPID PANEL: CPT

## 2018-03-26 PROCEDURE — G0439 PPPS, SUBSEQ VISIT: HCPCS | Performed by: INTERNAL MEDICINE

## 2018-03-26 RX ORDER — GLUCOSAM/CHON-MSM1/C/MANG/BOSW 500-416.6
1 TABLET ORAL 2 TIMES DAILY
Qty: 1 BOX | Refills: 3 | Status: SHIPPED | OUTPATIENT
Start: 2018-03-26 | End: 2018-03-28 | Stop reason: ALTCHOICE

## 2018-03-26 RX ORDER — BLOOD-GLUCOSE METER
1 EACH MISCELLANEOUS 2 TIMES DAILY
Qty: 1 DEVICE | Refills: 0 | Status: SHIPPED | OUTPATIENT
Start: 2018-03-26 | End: 2018-03-28 | Stop reason: ALTCHOICE

## 2018-03-26 RX ORDER — CALCIUM CITRATE/VITAMIN D3 200MG-6.25
TABLET ORAL
Qty: 100 STRIP | Refills: 3 | Status: SHIPPED | OUTPATIENT
Start: 2018-03-26 | End: 2018-03-28 | Stop reason: ALTCHOICE

## 2018-03-26 RX ORDER — ATORVASTATIN CALCIUM 20 MG/1
20 TABLET, FILM COATED ORAL NIGHTLY
Qty: 90 TABLET | Refills: 0 | Status: SHIPPED | OUTPATIENT
Start: 2018-03-26 | End: 2018-09-17

## 2018-03-26 RX ORDER — CALCIUM CITRATE/VITAMIN D3 200MG-6.25
TABLET ORAL
Qty: 100 STRIP | Refills: 3 | Status: SHIPPED | OUTPATIENT
Start: 2018-03-26 | End: 2018-03-26

## 2018-03-26 RX ORDER — ATORVASTATIN CALCIUM 20 MG/1
20 TABLET, FILM COATED ORAL NIGHTLY
Qty: 90 TABLET | Refills: 0 | Status: SHIPPED | OUTPATIENT
Start: 2018-03-26 | End: 2018-03-26

## 2018-03-26 RX ORDER — GLUCOSAM/CHON-MSM1/C/MANG/BOSW 500-416.6
1 TABLET ORAL 2 TIMES DAILY
Qty: 1 BOX | Refills: 3 | Status: SHIPPED | OUTPATIENT
Start: 2018-03-26 | End: 2018-03-26

## 2018-03-26 RX ORDER — BLOOD-GLUCOSE METER
1 EACH MISCELLANEOUS 2 TIMES DAILY
Qty: 1 DEVICE | Refills: 0 | Status: SHIPPED | OUTPATIENT
Start: 2018-03-26 | End: 2018-03-26

## 2018-03-26 NOTE — PROGRESS NOTES
Beryl Dowd is a 80year old female. HPI:   Patient presents for diabetes and medicare supervisit. 1. Diabetes: she is only taking metformin 500 mg (half-tablet) with breakfast and another 1/2 tablet with dinner.  500 mg twice daily caused frequency, or hematuria  SKIN: denies any unusual skin lesions or rashes  PSYCH: mood is stable. Denies any depression or anxiety.    EXT: denies edema     Wt Readings from Last 6 Encounters:  03/26/18 : 171 lb  09/13/17 : 174 lb 8 oz  09/06/17 : 177 lb  01 monofilament/sensation of both feet is normal.  Pulsation pedal pulse exam of right low legs/feet is normal as well. Unable to palpate tibial or dorsalis pedis pulse of left foot but cpaillary refill < 3 seconds.   Back: pain over midline low back to palpat tolerates statin therapy and how her epigastric tenderness is. Colon Cancer Screening: re-counseled and recommended and she is willing to undergo screening. Referral ordered.    Cervical Cancer Screening: neg cytology, HPV in 1/2017. Does not need repeat.

## 2018-03-26 NOTE — PATIENT INSTRUCTIONS
Please see Dr. Fazal Syed for a DIABETIC eye exam.     Please see Dr. Katrina Owusu for your colonoscopy this year. Please have your fasting labs done as soon as possible.     For your cholesterol, please start atorvastatin (lipitor) 20 mg once your back pain is

## 2018-03-28 ENCOUNTER — TELEPHONE (OUTPATIENT)
Dept: INTERNAL MEDICINE CLINIC | Facility: CLINIC | Age: 68
End: 2018-03-28

## 2018-03-28 NOTE — TELEPHONE ENCOUNTER
pts  stated that diabetic testing supplies are not covered and they are in need of alternative. Prescription sent to pharmacy.

## 2018-04-16 ENCOUNTER — OFFICE VISIT (OUTPATIENT)
Dept: PHYSICAL THERAPY | Age: 68
End: 2018-04-16
Attending: INTERNAL MEDICINE
Payer: MEDICARE

## 2018-04-16 DIAGNOSIS — M54.41 ACUTE MIDLINE LOW BACK PAIN WITH BILATERAL SCIATICA: ICD-10-CM

## 2018-04-16 DIAGNOSIS — M54.42 ACUTE MIDLINE LOW BACK PAIN WITH BILATERAL SCIATICA: ICD-10-CM

## 2018-04-16 PROCEDURE — 97162 PT EVAL MOD COMPLEX 30 MIN: CPT

## 2018-04-16 PROCEDURE — 97110 THERAPEUTIC EXERCISES: CPT

## 2018-04-16 NOTE — PROGRESS NOTES
SPINE EVALUATION:   Referring Physician: Dr. Henry Jones  Diagnosis: Low back pain,                       Bilateral knee OA     Date of Service: 4/16/2018     PATIENT SUMMARY   Lorella Mortimer is a 80year old y/o female who presents to therapy today wi walking in the hallway  Neuro Screen: Normal. No numbness/tingling     Supine Lumbar spine ROM:   Flexion: no loss  Rotation: moderate loss    Accessory motion: Pain with PIVM L4 and L5 decreased joint motion by 50%  Palpation: Marked tenderness L5 will be able to get into and out of her own bed at night    Frequency / Duration: Patient will be seen for 2 x/week or a total of 8 visits over a 90 day period. Treatment will include: Manual Therapy; Therapeutic Exercises; Neuromuscular Re-education;  Ther

## 2018-04-19 ENCOUNTER — APPOINTMENT (OUTPATIENT)
Dept: PHYSICAL THERAPY | Age: 68
End: 2018-04-19
Attending: INTERNAL MEDICINE
Payer: MEDICARE

## 2018-04-24 ENCOUNTER — OFFICE VISIT (OUTPATIENT)
Dept: PHYSICAL THERAPY | Age: 68
End: 2018-04-24
Attending: INTERNAL MEDICINE
Payer: MEDICARE

## 2018-04-24 PROCEDURE — 97110 THERAPEUTIC EXERCISES: CPT

## 2018-04-24 PROCEDURE — 97140 MANUAL THERAPY 1/> REGIONS: CPT

## 2018-04-24 PROCEDURE — 97112 NEUROMUSCULAR REEDUCATION: CPT

## 2018-04-24 NOTE — PROGRESS NOTES
Dx: Low back pain, Knee pain, OA     Authorized visits: 8       Next MD visit: none scheduled  Fall Risk: standard         Precautions: Diabetes, Breast Cancer, OA            Subjective: Knee pain 8/10 today, L > than R.  Low back and leg pain although less manual stretching    Charges: MT 1, NMRed 1, EX 1       Total Timed Treatment: 50 min  Total Treatment Time: 50 min

## 2018-04-26 ENCOUNTER — OFFICE VISIT (OUTPATIENT)
Dept: PHYSICAL THERAPY | Age: 68
End: 2018-04-26
Attending: INTERNAL MEDICINE
Payer: MEDICARE

## 2018-04-26 PROCEDURE — 97112 NEUROMUSCULAR REEDUCATION: CPT

## 2018-04-26 PROCEDURE — 97140 MANUAL THERAPY 1/> REGIONS: CPT

## 2018-04-26 PROCEDURE — 97110 THERAPEUTIC EXERCISES: CPT

## 2018-04-26 NOTE — PROGRESS NOTES
Dx: Low back pain, Knee pain, OA     Authorized visits: 8       Next MD visit: none scheduled  Fall Risk: standard         Precautions: Diabetes, Breast Cancer, OA            Subjective: Knee pain 8/10 today, L > than R.  Calf pain was less after STM last v flexion/extension ROM 5 mins 5 mins        Supine SKTC stretch R/L 30 sec hold  Gianluca stretch R/L x 10 sec hold Supine manual stretch piriformis, hamstrings R/L 30 sec hold        Sit to stand using hands on thighs x 10 Hook lying bent leg march x 2 mins

## 2018-04-30 ENCOUNTER — OFFICE VISIT (OUTPATIENT)
Dept: PHYSICAL THERAPY | Age: 68
End: 2018-04-30
Attending: INTERNAL MEDICINE
Payer: MEDICARE

## 2018-04-30 PROCEDURE — 97112 NEUROMUSCULAR REEDUCATION: CPT

## 2018-04-30 PROCEDURE — 97110 THERAPEUTIC EXERCISES: CPT

## 2018-04-30 PROCEDURE — 97140 MANUAL THERAPY 1/> REGIONS: CPT

## 2018-04-30 NOTE — PROGRESS NOTES
Dx: Low back pain, Knee pain, OA     Authorized visits: 8       Next MD visit: none scheduled  Fall Risk: standard         Precautions: Diabetes, Breast Cancer, OA            Subjective: Back and knee pain was better for 3 days after the last therapy briana band x 20 Standing heel raises x 20 Seated hamstring curls with Green t band  R/L x20       Hook lying bridging x 20 Seated knee ext with 3# wts R/L x 20 Seated knee ext with 3# wts R/L x20       Side lying clam R/L x 20 Side lying clam with red t band R/L

## 2018-05-02 ENCOUNTER — OFFICE VISIT (OUTPATIENT)
Dept: PHYSICAL THERAPY | Age: 68
End: 2018-05-02
Attending: INTERNAL MEDICINE
Payer: MEDICARE

## 2018-05-02 PROCEDURE — 97140 MANUAL THERAPY 1/> REGIONS: CPT

## 2018-05-02 PROCEDURE — 97112 NEUROMUSCULAR REEDUCATION: CPT

## 2018-05-02 PROCEDURE — 97110 THERAPEUTIC EXERCISES: CPT

## 2018-05-02 NOTE — PROGRESS NOTES
Dx: Low back pain, Knee pain, OA     Authorized visits: 8       Next MD visit: none scheduled  Fall Risk: standard         Precautions: Diabetes, Breast Cancer, OA            Subjective: Increased knee pain following last therapy session.  Took Ibuprofen la parallel bars 4 lengths Seated knee ext with 3# wts R/L x 15  Knee flexion with green t band R/L x 15      Hook lying hip abd with green t band x 20 Standing heel raises x 20 Seated hamstring curls with Green t band  R/L x20 Supine passive knee flex/ext x Total Timed Treatment: 50 min  Total Treatment Time: 50 min

## 2018-05-07 ENCOUNTER — OFFICE VISIT (OUTPATIENT)
Dept: PHYSICAL THERAPY | Age: 68
End: 2018-05-07
Attending: INTERNAL MEDICINE
Payer: MEDICARE

## 2018-05-07 PROCEDURE — 97140 MANUAL THERAPY 1/> REGIONS: CPT

## 2018-05-07 PROCEDURE — 97110 THERAPEUTIC EXERCISES: CPT

## 2018-05-07 PROCEDURE — 97112 NEUROMUSCULAR REEDUCATION: CPT

## 2018-05-07 NOTE — PROGRESS NOTES
Dx: Low back pain, Knee pain, OA     Authorized visits: 8       Next MD visit: none scheduled  Fall Risk: standard         Precautions: Diabetes, Breast Cancer, OA          Discharge Note:  Subjective: Back and knees are better since beginning therapy.  Abl bridging x 20 Seated knee ext with 3# wts R/L x 20 Seated knee ext with 3# wts R/L x20 Hook lying bridging x 15  Hip abd with t band x 20 Hook lying add squeeze with ball x 20   Side lying clam R/L x 20 Side lying clam with red t band R/L x 15 Side lying c stretching    Charges: MT 1, NMRed 1, EX 1       Total Timed Treatment: 50 min  Total Treatment Time: 50 min

## 2018-09-04 ENCOUNTER — TELEPHONE (OUTPATIENT)
Dept: INTERNAL MEDICINE CLINIC | Facility: CLINIC | Age: 68
End: 2018-09-04

## 2018-09-04 PROBLEM — E78.2 MIXED HYPERLIPIDEMIA: Status: ACTIVE | Noted: 2018-09-04

## 2018-09-04 NOTE — TELEPHONE ENCOUNTER
Amirah Pedraza MD  [facility]             Patient has appointment with me in a few weeks.  Please call her and ask her to complete fasting labs asap

## 2018-09-10 ENCOUNTER — APPOINTMENT (OUTPATIENT)
Dept: LAB | Age: 68
End: 2018-09-10
Attending: INTERNAL MEDICINE
Payer: MEDICARE

## 2018-09-10 DIAGNOSIS — E78.2 MIXED HYPERLIPIDEMIA: ICD-10-CM

## 2018-09-10 DIAGNOSIS — E11.9 TYPE 2 DIABETES MELLITUS WITHOUT COMPLICATION, WITHOUT LONG-TERM CURRENT USE OF INSULIN (HCC): ICD-10-CM

## 2018-09-10 LAB
ALT SERPL-CCNC: 21 U/L (ref 14–54)
ANION GAP SERPL CALC-SCNC: 7 MMOL/L (ref 0–18)
AST SERPL-CCNC: 14 U/L (ref 15–41)
BUN BLD-MCNC: 13 MG/DL (ref 8–20)
BUN/CREAT SERPL: 16.5 (ref 10–20)
CALCIUM BLD-MCNC: 9.6 MG/DL (ref 8.3–10.3)
CHLORIDE SERPL-SCNC: 105 MMOL/L (ref 101–111)
CHOLEST SMN-MCNC: 200 MG/DL (ref ?–200)
CO2 SERPL-SCNC: 28 MMOL/L (ref 22–32)
CREAT BLD-MCNC: 0.79 MG/DL (ref 0.55–1.02)
EST. AVERAGE GLUCOSE BLD GHB EST-MCNC: 131 MG/DL (ref 68–126)
GLUCOSE BLD-MCNC: 105 MG/DL (ref 70–99)
HBA1C MFR BLD HPLC: 6.2 % (ref ?–5.7)
HDLC SERPL-MCNC: 60 MG/DL (ref 40–59)
LDLC SERPL CALC-MCNC: 104 MG/DL (ref ?–100)
NONHDLC SERPL-MCNC: 140 MG/DL (ref ?–130)
OSMOLALITY SERPL CALC.SUM OF ELEC: 290 MOSM/KG (ref 275–295)
POTASSIUM SERPL-SCNC: 4.2 MMOL/L (ref 3.6–5.1)
SODIUM SERPL-SCNC: 140 MMOL/L (ref 136–144)
TRIGL SERPL-MCNC: 180 MG/DL (ref 30–149)
VLDLC SERPL CALC-MCNC: 36 MG/DL (ref 0–30)

## 2018-09-10 PROCEDURE — 36415 COLL VENOUS BLD VENIPUNCTURE: CPT

## 2018-09-10 PROCEDURE — 83036 HEMOGLOBIN GLYCOSYLATED A1C: CPT

## 2018-09-10 PROCEDURE — 84460 ALANINE AMINO (ALT) (SGPT): CPT

## 2018-09-10 PROCEDURE — 80048 BASIC METABOLIC PNL TOTAL CA: CPT

## 2018-09-10 PROCEDURE — 80061 LIPID PANEL: CPT

## 2018-09-10 PROCEDURE — 84450 TRANSFERASE (AST) (SGOT): CPT

## 2018-09-17 ENCOUNTER — OFFICE VISIT (OUTPATIENT)
Dept: INTERNAL MEDICINE CLINIC | Facility: CLINIC | Age: 68
End: 2018-09-17

## 2018-09-17 VITALS
BODY MASS INDEX: 33.57 KG/M2 | HEIGHT: 60.25 IN | TEMPERATURE: 98 F | HEART RATE: 60 BPM | WEIGHT: 173.25 LBS | SYSTOLIC BLOOD PRESSURE: 142 MMHG | RESPIRATION RATE: 16 BRPM | DIASTOLIC BLOOD PRESSURE: 70 MMHG

## 2018-09-17 DIAGNOSIS — E78.2 MIXED HYPERLIPIDEMIA: Primary | ICD-10-CM

## 2018-09-17 DIAGNOSIS — E66.09 CLASS 1 OBESITY DUE TO EXCESS CALORIES WITH SERIOUS COMORBIDITY AND BODY MASS INDEX (BMI) OF 32.0 TO 32.9 IN ADULT: ICD-10-CM

## 2018-09-17 DIAGNOSIS — E11.9 TYPE 2 DIABETES MELLITUS WITHOUT COMPLICATION, WITHOUT LONG-TERM CURRENT USE OF INSULIN (HCC): ICD-10-CM

## 2018-09-17 DIAGNOSIS — Z85.3 HISTORY OF LEFT BREAST CANCER: ICD-10-CM

## 2018-09-17 DIAGNOSIS — M17.10 PRIMARY LOCALIZED OSTEOARTHRITIS OF KNEE: ICD-10-CM

## 2018-09-17 DIAGNOSIS — M72.2 PLANTAR FASCIITIS: ICD-10-CM

## 2018-09-17 DIAGNOSIS — Z13.5 SCREENING FOR DIABETIC RETINOPATHY: ICD-10-CM

## 2018-09-17 DIAGNOSIS — M54.50 CHRONIC MIDLINE LOW BACK PAIN WITHOUT SCIATICA: ICD-10-CM

## 2018-09-17 DIAGNOSIS — G89.29 CHRONIC MIDLINE LOW BACK PAIN WITHOUT SCIATICA: ICD-10-CM

## 2018-09-17 PROCEDURE — 99214 OFFICE O/P EST MOD 30 MIN: CPT | Performed by: INTERNAL MEDICINE

## 2018-09-17 RX ORDER — ATORVASTATIN CALCIUM 20 MG/1
20 TABLET, FILM COATED ORAL NIGHTLY
Qty: 90 TABLET | Refills: 3 | Status: SHIPPED | OUTPATIENT
Start: 2018-09-17 | End: 2019-09-17

## 2018-09-17 NOTE — PROGRESS NOTES
Pedro Rubio is a 80year old female. HPI:   Patient presents for the following issues. 1. Diabetes: A1C is controlled. Tolerating metformin well. FBS < 130s. States \"i have not been caring about my diet for past 1 month.  I just cannot cont • Diabetes Brother       Past Medical History:   Diagnosis Date   • Breast CA ([facility] [address] 1990    left breast   • Breast cancer in female [facility]) 1990    s/p chemo and radiation      Past Surgical History:  1990: CHEMOTHERAPY;  Left      Comment:  6 months  1990: 3/2018  - Depression Screen: PHQ score 0 in 3/2018  - no indication for ASA  # Bilateral Knee Pain 2/2 OA: chronic, worsening because she is not doing any HEP. Significantly improved after PT. She is not interested in injections.   # R plantar fasciitis: ha pneumovac - will consider it. Advised on shingles and TDAP  Healthcare Living Will: thinks she has the resources - will bring in a copy.    Medical POA: , Rosa Elena Schaefer.  Brother, Vanessa Chen      The patient indicates understanding of these is

## 2018-09-17 NOTE — PATIENT INSTRUCTIONS
For your high cholesterol, please start the atorvastatin medication every day. Please repeat your labs in 6-12 weeks. You will need to fast    Please return to see me in 8 weeks to re-check your blood pressure.      Please have a diabetic eye exam as soon © 4533-1412 [employer] [address]. [address] [facility], [address] [place]. All rights reserved. This information is not intended as a substitute for professional medical care. Always follow your healthcare professional's instructions.         Plantar · Premade or custom-made night splints keep the heel stretched out while you sleep. They may prevent morning pain.   · Avoid activities that stress the feet: jogging, prolonged standing or walking, contact sports, etc.  · First thing in the morning and befo First, your healthcare provider tries to find out the cause of your problem. He or she can then suggest ways to ease pain. If your pain is due to poor foot mechanics, occasionally custom-made shoe inserts (orthoses) may help.     Ease symptoms  · To relieve Plantar fasciitis is a condition that causes foot and heel pain. The plantar fascia is a tough band of tissue that runs across the bottom of the foot from the heel to the toes. This tissue pulls on the heel bone.  It supports the arch of the foot as it push · Using heel cups or foot inserts (orthotics). These are placed in the shoes to help support the heel or arch and cushion the heel. You may also be told to buy proper-fitting shoes with good arch support and cushioned soles. · Taping the foot.  This suppor 2. Hold your toes with your right hand. Gently bend the toes backward. Feel a stretch in the undersides of the toes and ball of the foot. Hold for 30 to 60 seconds. 3. Then gently bend the toes in the other direction.  Gently press on them until your foot · Ice helps relieve pain and swelling. It is often used after activity. Use a cold pack wrapped in a thin cloth on the joint for 10 to 15 minutes at a time.   · Alternating hot and cold can also help relieve pain.  Try this for 20 minutes at a time, several Pain from osteoarthritis can interfere with your life in many ways. It can make it hard to be active and take good care of yourself. Untreated pain may make sleep difficult. It may also contribute to depression and anxiety.   Controlling pain involves lifes Topical medicines are those applied directly to the skin over the affected joint. They may be lotions, cream, sprays, ointments, or gels. They can be used along with some oral medicines:  · NSAID creams may reduce swelling and relieve pain.   · Capsaicin (c Every joint contains a smooth tissue called cartilage. Cartilage cushions the ends of bones and helps bones in a joint glide smoothly against each other. Knee osteoarthritis occurs when cartilage in the knee joint begins to break down and wear away.  Bones If other treatments don’t do enough to relieve symptoms, you may need surgery to replace the joint. During this surgery, the damaged joint is removed. An artificial knee joint is then put into place.  This can help relieve pain and stiffness and restore mov OA can affect any joint. Weight-bearing joints, such as the hips and knees, are often affected. Common symptoms are joint pain and stiffness. Pain and stiffness may get worse with periods of inactivity or overuse.  For example, you may have more stiffness f Bony spurs may form on the joints between the vertebrae (spinal bones). And disks (cushions of cartilage between vertebrae) may wear down. Pain may affect the lower back or leg.   Hip  Cartilage damage can occur in the large “ball and socket” joint that con Getting plenty of sleep can help reduce pain and let you function better. If pain is making it hard for you to sleep, ask your doctor about ways to control pain and ensure a good night’s sleep.  Cutting back on caffeine and alcohol can help you sleep better For more information or to find a practitioner in your area, contact the American Academy [employer]upuncture. Its website is: Fantom.nl.  Massage  Therapeutic massage has many benefits.  It may:  · Help you and your muscles rela © 0087-4245 [address]. [address] [facility], [facility] [place]. All rights reserved. This information is not intended as a substitute for professional medical care. Always follow your healthcare professional's instructions.

## 2018-09-20 ENCOUNTER — HOSPITAL ENCOUNTER (OUTPATIENT)
Dept: MAMMOGRAPHY | Age: 68
Discharge: HOME | End: 2018-09-20
Attending: INTERNAL MEDICINE
Payer: MEDICARE

## 2018-09-20 DIAGNOSIS — Z12.39 SCREENING BREAST EXAMINATION: ICD-10-CM

## 2018-09-20 PROCEDURE — 77067 SCR MAMMO BI INCL CAD: CPT | Performed by: INTERNAL MEDICINE

## 2018-09-20 PROCEDURE — 77063 BREAST TOMOSYNTHESIS BI: CPT | Performed by: INTERNAL MEDICINE

## 2018-10-10 ENCOUNTER — HOSPITAL ENCOUNTER (OUTPATIENT)
Dept: MAMMOGRAPHY | Facility: HOSPITAL | Age: 68
Discharge: HOME | End: 2018-10-10
Attending: INTERNAL MEDICINE
Payer: MEDICARE

## 2018-10-10 DIAGNOSIS — R92.2 INCONCLUSIVE MAMMOGRAM: ICD-10-CM

## 2018-10-10 PROCEDURE — 76642 ULTRASOUND BREAST LIMITED: CPT | Performed by: INTERNAL MEDICINE

## 2018-10-10 PROCEDURE — 77061 BREAST TOMOSYNTHESIS UNI: CPT | Performed by: INTERNAL MEDICINE

## 2018-10-10 PROCEDURE — 77065 DX MAMMO INCL CAD UNI: CPT | Performed by: INTERNAL MEDICINE

## 2018-10-18 ENCOUNTER — TELEPHONE (OUTPATIENT)
Dept: INTERNAL MEDICINE CLINIC | Facility: CLINIC | Age: 68
End: 2018-10-18

## 2018-10-18 DIAGNOSIS — Z13.5 SCREENING FOR DIABETIC RETINOPATHY: Primary | ICD-10-CM

## 2018-10-18 DIAGNOSIS — E78.2 MIXED HYPERLIPIDEMIA: ICD-10-CM

## 2018-10-18 DIAGNOSIS — E11.9 TYPE 2 DIABETES MELLITUS WITHOUT COMPLICATION, WITHOUT LONG-TERM CURRENT USE OF INSULIN (HCC): ICD-10-CM

## 2018-10-18 DIAGNOSIS — E66.09 CLASS 1 OBESITY DUE TO EXCESS CALORIES WITH SERIOUS COMORBIDITY AND BODY MASS INDEX (BMI) OF 32.0 TO 32.9 IN ADULT: ICD-10-CM

## 2018-10-18 NOTE — TELEPHONE ENCOUNTER
Pt was referred to [facility] but they do not take her insurance [employer] so she would like another place that we can refer her to

## 2018-10-18 NOTE — TELEPHONE ENCOUNTER
Spoke with reception at Dr. Jayme Snowden office. She stated as of Aug 31 they no longer accept medicare advantage human ppo or hmo. Per [facility] website providers are still on list.   Referral placed for Dr. Gabino Saint. Pt given provider information for Dr. Kirt Whitaker.

## 2018-10-24 NOTE — TELEPHONE ENCOUNTER
Patient informed to contact her insurance for a ophthalmology in network, patient verbalized understanding

## 2018-10-24 NOTE — TELEPHONE ENCOUNTER
Patient called back. Dr. Bam Pichardo not accepting new patient.  Needs the name of a new specialist

## 2018-10-24 NOTE — TELEPHONE ENCOUNTER
Pt called advise called [phone]# and was given a dr in network. Pt needs a referral for dr. Mirna Tirado ph. [phone]

## 2018-10-25 DIAGNOSIS — E11.9 DIABETES MELLITUS WITHOUT COMPLICATION (HCC): Primary | ICD-10-CM

## 2018-11-08 ENCOUNTER — APPOINTMENT (OUTPATIENT)
Dept: LAB | Age: 68
End: 2018-11-08
Attending: INTERNAL MEDICINE
Payer: MEDICARE

## 2018-11-08 DIAGNOSIS — E78.2 MIXED HYPERLIPIDEMIA: ICD-10-CM

## 2018-11-08 DIAGNOSIS — E11.9 TYPE 2 DIABETES MELLITUS WITHOUT COMPLICATION, WITHOUT LONG-TERM CURRENT USE OF INSULIN (HCC): ICD-10-CM

## 2018-11-08 DIAGNOSIS — E66.09 CLASS 1 OBESITY DUE TO EXCESS CALORIES WITH SERIOUS COMORBIDITY AND BODY MASS INDEX (BMI) OF 32.0 TO 32.9 IN ADULT: ICD-10-CM

## 2018-11-08 DIAGNOSIS — Z13.5 SCREENING FOR DIABETIC RETINOPATHY: ICD-10-CM

## 2018-11-08 PROCEDURE — 84460 ALANINE AMINO (ALT) (SGPT): CPT

## 2018-11-08 PROCEDURE — 36415 COLL VENOUS BLD VENIPUNCTURE: CPT

## 2018-11-08 PROCEDURE — 84450 TRANSFERASE (AST) (SGOT): CPT

## 2018-11-08 PROCEDURE — 80061 LIPID PANEL: CPT

## 2018-11-14 ENCOUNTER — OFFICE VISIT (OUTPATIENT)
Dept: INTERNAL MEDICINE CLINIC | Facility: CLINIC | Age: 68
End: 2018-11-14

## 2018-11-14 VITALS
RESPIRATION RATE: 12 BRPM | WEIGHT: 172.25 LBS | DIASTOLIC BLOOD PRESSURE: 80 MMHG | TEMPERATURE: 98 F | HEART RATE: 56 BPM | HEIGHT: 60.25 IN | BODY MASS INDEX: 33.38 KG/M2 | SYSTOLIC BLOOD PRESSURE: 142 MMHG

## 2018-11-14 DIAGNOSIS — M54.50 CHRONIC MIDLINE LOW BACK PAIN WITHOUT SCIATICA: ICD-10-CM

## 2018-11-14 DIAGNOSIS — M72.2 PLANTAR FASCIITIS: ICD-10-CM

## 2018-11-14 DIAGNOSIS — M17.10 PRIMARY LOCALIZED OSTEOARTHRITIS OF KNEE: ICD-10-CM

## 2018-11-14 DIAGNOSIS — G89.29 CHRONIC MIDLINE LOW BACK PAIN WITHOUT SCIATICA: ICD-10-CM

## 2018-11-14 DIAGNOSIS — E78.2 MIXED HYPERLIPIDEMIA: ICD-10-CM

## 2018-11-14 DIAGNOSIS — E11.9 TYPE 2 DIABETES MELLITUS WITHOUT COMPLICATION, WITHOUT LONG-TERM CURRENT USE OF INSULIN (HCC): Primary | ICD-10-CM

## 2018-11-14 DIAGNOSIS — E66.09 CLASS 1 OBESITY DUE TO EXCESS CALORIES WITH SERIOUS COMORBIDITY AND BODY MASS INDEX (BMI) OF 32.0 TO 32.9 IN ADULT: ICD-10-CM

## 2018-11-14 PROCEDURE — 99214 OFFICE O/P EST MOD 30 MIN: CPT | Performed by: INTERNAL MEDICINE

## 2018-11-14 PROCEDURE — G0009 ADMIN PNEUMOCOCCAL VACCINE: HCPCS | Performed by: INTERNAL MEDICINE

## 2018-11-14 PROCEDURE — 90732 PPSV23 VACC 2 YRS+ SUBQ/IM: CPT | Performed by: INTERNAL MEDICINE

## 2018-11-14 RX ORDER — IRBESARTAN 75 MG/1
75 TABLET ORAL DAILY
Qty: 90 TABLET | Refills: 1 | Status: SHIPPED | OUTPATIENT
Start: 2018-11-14 | End: 2019-11-04

## 2018-11-14 RX ORDER — OMEPRAZOLE 20 MG/1
20 CAPSULE, DELAYED RELEASE ORAL
Qty: 90 CAPSULE | Refills: 0 | Status: SHIPPED | OUTPATIENT
Start: 2018-11-14 | End: 2019-11-04

## 2018-11-14 NOTE — PATIENT INSTRUCTIONS
Please complete your labs in March, 2019. For your high blood pressure, please start irbesartan 75 mg every morning. We need to protect your stomach from ibuprofen:  1. Please temporarily stop the ranitidine (zantac)  2.  Please START omeprazole 20

## 2018-11-14 NOTE — PROGRESS NOTES
Catracho Caban is a 80year old female. HPI:   Patient presents for the following issues. 1. HLP: tolerating atorvastatin very well. Her cholesterol has significantly improved on it. 2. DM: FBS are < 130s.  Tolerating metformin better with [facility] Alcohol/week: 0.0 oz    Drug use: No            EXAM:   /81 (BP Location: Left arm, Patient Position: Sitting, Cuff Size: adult)   Pulse 56   Temp 97.7 °F (36.5 °C) (Oral)   Resp 12   Ht 60.25\"   Wt 172 lb 4 oz   BMI 33.36 kg/m²   GENERAL: A&O well daily to prevent ulcers. Unfortunately celebrex is not covered. Hopeful to d/c omeprazole and resume ranitidine once her pain is better controlled and she does not need daily nsaids.   -  Dietary restrictions discussed.    - declines repeating EGD now  # Mo

## 2018-11-16 ENCOUNTER — TELEPHONE (OUTPATIENT)
Dept: INTERNAL MEDICINE CLINIC | Facility: CLINIC | Age: 68
End: 2018-11-16

## 2018-11-27 ENCOUNTER — OFFICE VISIT (OUTPATIENT)
Dept: PHYSICAL THERAPY | Age: 68
End: 2018-11-27
Attending: INTERNAL MEDICINE
Payer: MEDICARE

## 2018-11-27 DIAGNOSIS — M54.50 CHRONIC MIDLINE LOW BACK PAIN WITHOUT SCIATICA: ICD-10-CM

## 2018-11-27 DIAGNOSIS — M17.10 PRIMARY LOCALIZED OSTEOARTHRITIS OF KNEE: ICD-10-CM

## 2018-11-27 DIAGNOSIS — G89.29 CHRONIC MIDLINE LOW BACK PAIN WITHOUT SCIATICA: ICD-10-CM

## 2018-11-27 DIAGNOSIS — M72.2 PLANTAR FASCIITIS: ICD-10-CM

## 2018-11-27 PROCEDURE — 97110 THERAPEUTIC EXERCISES: CPT

## 2018-11-27 PROCEDURE — 97162 PT EVAL MOD COMPLEX 30 MIN: CPT

## 2018-11-27 PROCEDURE — 97140 MANUAL THERAPY 1/> REGIONS: CPT

## 2018-11-27 NOTE — PROGRESS NOTES
SPINE EVALUATION:   Referring Physician: Dr. Sergio Quesada  Diagnosis: Chronic low back pain, OA knees, Plantar fascitis     Date of Service: 11/27/2018     PATIENT SUMMARY   Babs Peters is a 80year old y/o female who presents to therapy today with c 4/5; L 4/5  Knee extension: R 4/5; L 4/5  PF: R 4/5; L 4/5  DF: R 4/5; L 4/5     Flexibility:   LE   Hip Flexor: normal  Hamstrings: minimal tightness  Piriformis: minimal tightness  Quads: moderate tightness  Gastroc-soleus: minimal tightness     Balance: precautions, and treatment options and has agreed to actively participate in planning and for this course of care. Thank you for your referral. Please co-sign or sign and return this letter via fax as soon as possible to [phone].  If you have any qu

## 2018-11-29 ENCOUNTER — OFFICE VISIT (OUTPATIENT)
Dept: PHYSICAL THERAPY | Age: 68
End: 2018-11-29
Attending: INTERNAL MEDICINE
Payer: MEDICARE

## 2018-11-29 PROCEDURE — 97112 NEUROMUSCULAR REEDUCATION: CPT

## 2018-11-29 PROCEDURE — 97110 THERAPEUTIC EXERCISES: CPT

## 2018-11-29 PROCEDURE — 97140 MANUAL THERAPY 1/> REGIONS: CPT

## 2018-11-29 NOTE — PROGRESS NOTES
Dx: LBP, Knee pain, Plantar fascitis                  Next MD visit: none scheduled  Fall Risk: standard         Precautions: n/a             Subjective: Low back pain and left knee pain today 8/10, heel pain on getting up from sitting, with standing and w Sit to stand with abd bracing x 5           Charges: NMRed 1, EX 1, MT 1       Total Timed Treatment: 45 min  Total Treatment Time: 45 min

## 2018-12-03 ENCOUNTER — APPOINTMENT (OUTPATIENT)
Dept: PHYSICAL THERAPY | Age: 68
End: 2018-12-03
Attending: INTERNAL MEDICINE
Payer: MEDICARE

## 2018-12-06 ENCOUNTER — OFFICE VISIT (OUTPATIENT)
Dept: PHYSICAL THERAPY | Age: 68
End: 2018-12-06
Attending: INTERNAL MEDICINE
Payer: MEDICARE

## 2018-12-06 PROCEDURE — 97140 MANUAL THERAPY 1/> REGIONS: CPT

## 2018-12-06 PROCEDURE — 97110 THERAPEUTIC EXERCISES: CPT

## 2018-12-06 PROCEDURE — 97112 NEUROMUSCULAR REEDUCATION: CPT

## 2018-12-06 NOTE — PROGRESS NOTES
Dx: LBP, Knee pain, Plantar fascitis                  Next MD visit: none scheduled  Fall Risk: standard         Precautions: n/a             Subjective: My pain is really bad in the knees and the back. The foot is a little better.  Low back pain and left k 20  Add squeeze with ball x 20        Supine SAQ over roll R/L x 15 Supine SAQ over roll R/L x 20        Hook lying abd bracing with bent leg lift R/L x 10  Bent leg fallout R/L x 10 Side lying clam R/L x 10  Hip abd R/L x 10                             Ho

## 2018-12-10 ENCOUNTER — OFFICE VISIT (OUTPATIENT)
Dept: PHYSICAL THERAPY | Age: 68
End: 2018-12-10
Attending: INTERNAL MEDICINE
Payer: MEDICARE

## 2018-12-10 PROCEDURE — 97140 MANUAL THERAPY 1/> REGIONS: CPT

## 2018-12-10 PROCEDURE — 97110 THERAPEUTIC EXERCISES: CPT

## 2018-12-10 PROCEDURE — 97112 NEUROMUSCULAR REEDUCATION: CPT

## 2018-12-10 NOTE — PROGRESS NOTES
Dx: LBP, Knee pain, Plantar fascitis                  Next MD visit: none scheduled  Fall Risk: standard         Precautions: n/a             Subjective: This weekend I did feel a little bit better. The foot is a little better.  Low back pain and left knee abd with green t band x 20       Hook lying hip abd with green t band x 15  Add squeeze with ball x 15 Hook lying hip abd with green t band x 20  Add squeeze with ball x 20 Hook lying abd bracing 10 x 5 sec hold  Bent leg lift R/L x 10       Supine SAQ ove

## 2018-12-12 ENCOUNTER — OFFICE VISIT (OUTPATIENT)
Dept: PHYSICAL THERAPY | Age: 68
End: 2018-12-12
Attending: INTERNAL MEDICINE
Payer: MEDICARE

## 2018-12-12 PROCEDURE — 97110 THERAPEUTIC EXERCISES: CPT

## 2018-12-12 PROCEDURE — 97140 MANUAL THERAPY 1/> REGIONS: CPT

## 2018-12-12 PROCEDURE — 97112 NEUROMUSCULAR REEDUCATION: CPT

## 2018-12-12 NOTE — PROGRESS NOTES
Dx: LBP, Knee pain, Plantar fascitis                  Next MD visit: March 2019  Fall Risk: standard         Precautions: n/a             Subjective: Left knee pain during the night, disturbed sleep. The foot is a little better.  Low back pain and left kne R/L 3 x 10 sec hold Hook lying bent leg lift R/L x 20 Hip abd with green t band x 20 X 20      Hook lying hip abd with green t band x 15  Add squeeze with ball x 15 Hook lying hip abd with green t band x 20  Add squeeze with ball x 20 Hook lying abd bracin

## 2018-12-14 ENCOUNTER — OFFICE VISIT (OUTPATIENT)
Dept: PHYSICAL THERAPY | Age: 68
End: 2018-12-14
Attending: INTERNAL MEDICINE
Payer: MEDICARE

## 2018-12-14 PROCEDURE — 97140 MANUAL THERAPY 1/> REGIONS: CPT

## 2018-12-14 PROCEDURE — 97112 NEUROMUSCULAR REEDUCATION: CPT

## 2018-12-14 PROCEDURE — 97110 THERAPEUTIC EXERCISES: CPT

## 2018-12-14 NOTE — PROGRESS NOTES
Dx: LBP, Knee pain, Plantar fascitis                  Next MD visit: March 2019  Fall Risk: standard         Precautions: n/a             Subjective: My knees have been really sore for the last 2 days. The foot is feeling better.  Low back pain 7/10 is not level 4   7 mins Supine knee flexion /extension R/L x 10     Hook lying LTR x 10 Supine heel slides R/L x 10 Supine heel slides R/L x 10 X 10 Hook lying hip abd with green t band x 20  Add squeeze with ball x 20     Hamstring stretch R/L 3 x 10 sec hold Ho 20 sec hold Supine hamstring stretch R/L 3 x 20 sec hold  Piriformis stretch R/L 3 x 10 sec hold Supine hamstring stretch R/L 3 x 20 sec hold  Piriformis stretch R/L 3 x 10 sec hold       Sit to stand with abd bracing x 5 Supine STM R heel 5 mins  Passive

## 2018-12-20 ENCOUNTER — OFFICE VISIT (OUTPATIENT)
Dept: PHYSICAL THERAPY | Age: 68
End: 2018-12-20
Attending: INTERNAL MEDICINE
Payer: MEDICARE

## 2018-12-20 PROCEDURE — 97140 MANUAL THERAPY 1/> REGIONS: CPT

## 2018-12-20 PROCEDURE — 97110 THERAPEUTIC EXERCISES: CPT

## 2018-12-20 PROCEDURE — 97112 NEUROMUSCULAR REEDUCATION: CPT

## 2018-12-20 NOTE — PROGRESS NOTES
Dx: LBP, Knee pain, Plantar fascitis                  Next MD visit: March 2019  Fall Risk: standard         Precautions: n/a             Subjective: Knee pain 8/10 today. The foot is feeling better. Low back pain 7/10.  Able to stand for 10 mins, walking 10  Quad sets R/L x 10    Hook lying LTR x 10 Supine heel slides R/L x 10 Supine heel slides R/L x 10 X 10 Hook lying hip abd with green t band x 20  Add squeeze with ball x 20 Hook lying add squeeze with ball x 20  Hip abd with green t band x 20    Hamstr musculature 10 mins  STM R heel and plantarfascia 5 mins Prone lying STM low back and SI joints 10 mins Prone STM R heel and plantar fascis 5 mins  Passive stretch R plantar fascia 3 x 20 sec hold Sit to stand with abd bracing x 10 Sit to stand x 10 using

## 2019-03-16 DIAGNOSIS — E78.2 MIXED HYPERLIPIDEMIA: ICD-10-CM

## 2019-03-16 DIAGNOSIS — E11.9 TYPE 2 DIABETES MELLITUS WITHOUT COMPLICATION, WITHOUT LONG-TERM CURRENT USE OF INSULIN (HCC): ICD-10-CM

## 2019-03-16 DIAGNOSIS — Z13.5 SCREENING FOR DIABETIC RETINOPATHY: ICD-10-CM

## 2019-03-16 DIAGNOSIS — E66.09 CLASS 1 OBESITY DUE TO EXCESS CALORIES WITH SERIOUS COMORBIDITY AND BODY MASS INDEX (BMI) OF 32.0 TO 32.9 IN ADULT: ICD-10-CM

## 2019-03-18 NOTE — TELEPHONE ENCOUNTER
//Refill requested:   Requested Prescriptions     Pending Prescriptions Disp Refills   • METFORMIN  MG Oral Tab [Pharmacy Med Name: metFORMIN HCl Oral Tablet 500 MG] 90 tablet 0     Sig: TAKE 1 TABLET BY MOUTH ONE TIME A DAY WITH BREAKFAST       Aren

## 2019-04-11 ENCOUNTER — APPOINTMENT (OUTPATIENT)
Dept: LAB | Age: 69
End: 2019-04-11
Attending: INTERNAL MEDICINE
Payer: MEDICARE

## 2019-04-11 DIAGNOSIS — M17.10 PRIMARY LOCALIZED OSTEOARTHRITIS OF KNEE: ICD-10-CM

## 2019-04-11 DIAGNOSIS — G89.29 CHRONIC MIDLINE LOW BACK PAIN WITHOUT SCIATICA: ICD-10-CM

## 2019-04-11 DIAGNOSIS — M72.2 PLANTAR FASCIITIS: ICD-10-CM

## 2019-04-11 DIAGNOSIS — E66.09 CLASS 1 OBESITY DUE TO EXCESS CALORIES WITH SERIOUS COMORBIDITY AND BODY MASS INDEX (BMI) OF 32.0 TO 32.9 IN ADULT: ICD-10-CM

## 2019-04-11 DIAGNOSIS — E11.9 TYPE 2 DIABETES MELLITUS WITHOUT COMPLICATION, WITHOUT LONG-TERM CURRENT USE OF INSULIN (HCC): ICD-10-CM

## 2019-04-11 DIAGNOSIS — M54.50 CHRONIC MIDLINE LOW BACK PAIN WITHOUT SCIATICA: ICD-10-CM

## 2019-04-11 DIAGNOSIS — E78.2 MIXED HYPERLIPIDEMIA: ICD-10-CM

## 2019-04-11 PROCEDURE — 82570 ASSAY OF URINE CREATININE: CPT

## 2019-04-11 PROCEDURE — 83036 HEMOGLOBIN GLYCOSYLATED A1C: CPT

## 2019-04-11 PROCEDURE — 84450 TRANSFERASE (AST) (SGOT): CPT

## 2019-04-11 PROCEDURE — 80048 BASIC METABOLIC PNL TOTAL CA: CPT

## 2019-04-11 PROCEDURE — 86803 HEPATITIS C AB TEST: CPT

## 2019-04-11 PROCEDURE — 82043 UR ALBUMIN QUANTITATIVE: CPT

## 2019-04-11 PROCEDURE — 84460 ALANINE AMINO (ALT) (SGPT): CPT

## 2019-04-11 PROCEDURE — 36415 COLL VENOUS BLD VENIPUNCTURE: CPT

## 2019-06-19 DIAGNOSIS — E78.2 MIXED HYPERLIPIDEMIA: ICD-10-CM

## 2019-06-19 DIAGNOSIS — E11.9 TYPE 2 DIABETES MELLITUS WITHOUT COMPLICATION, WITHOUT LONG-TERM CURRENT USE OF INSULIN (HCC): ICD-10-CM

## 2019-06-19 DIAGNOSIS — E66.09 CLASS 1 OBESITY DUE TO EXCESS CALORIES WITH SERIOUS COMORBIDITY AND BODY MASS INDEX (BMI) OF 32.0 TO 32.9 IN ADULT: ICD-10-CM

## 2019-06-19 DIAGNOSIS — Z13.5 SCREENING FOR DIABETIC RETINOPATHY: ICD-10-CM

## 2019-06-20 NOTE — TELEPHONE ENCOUNTER
Last OV: 11/14/18 with Dr. Cordell Rees  Last refill date: 3/18/19     #/refills: #90, 0 refills  When pt was asked to return for OV: 3 months  Upcoming appt: 6/26/19 with Dr. Cordell Rees  Last labs 4/11/19

## 2019-06-23 DIAGNOSIS — E78.2 MIXED HYPERLIPIDEMIA: ICD-10-CM

## 2019-06-23 DIAGNOSIS — Z13.5 SCREENING FOR DIABETIC RETINOPATHY: ICD-10-CM

## 2019-06-23 DIAGNOSIS — E11.9 TYPE 2 DIABETES MELLITUS WITHOUT COMPLICATION, WITHOUT LONG-TERM CURRENT USE OF INSULIN (HCC): ICD-10-CM

## 2019-06-23 DIAGNOSIS — E66.09 CLASS 1 OBESITY DUE TO EXCESS CALORIES WITH SERIOUS COMORBIDITY AND BODY MASS INDEX (BMI) OF 32.0 TO 32.9 IN ADULT: ICD-10-CM

## 2019-06-25 ENCOUNTER — MA CHART PREP (OUTPATIENT)
Dept: FAMILY MEDICINE CLINIC | Facility: CLINIC | Age: 69
End: 2019-06-25

## 2019-06-25 RX ORDER — CALCIUM CITRATE/VITAMIN D3 200MG-6.25
TABLET ORAL
Qty: 100 STRIP | Refills: 0 | Status: SHIPPED | OUTPATIENT
Start: 2019-06-25

## 2019-06-25 NOTE — TELEPHONE ENCOUNTER
Refill requested:   Requested Prescriptions     Pending Prescriptions Disp Refills   • METFORMIN  MG Oral Tab [Pharmacy Med Name: metFORMIN HCl Oral Tablet 500 MG] 90 tablet 0     Sig: TAKE 1 TABLET BY MOUTH ONE TIME A DAY WITH BREAKFAST   • Glucose

## 2019-06-26 ENCOUNTER — OFFICE VISIT (OUTPATIENT)
Dept: INTERNAL MEDICINE CLINIC | Facility: CLINIC | Age: 69
End: 2019-06-26
Payer: MEDICARE

## 2019-06-26 VITALS
HEIGHT: 60.5 IN | WEIGHT: 165.75 LBS | OXYGEN SATURATION: 98 % | BODY MASS INDEX: 31.7 KG/M2 | TEMPERATURE: 98 F | SYSTOLIC BLOOD PRESSURE: 130 MMHG | DIASTOLIC BLOOD PRESSURE: 80 MMHG | RESPIRATION RATE: 14 BRPM | HEART RATE: 67 BPM

## 2019-06-26 DIAGNOSIS — I15.2 HYPERTENSION ASSOCIATED WITH DIABETES (HCC): ICD-10-CM

## 2019-06-26 DIAGNOSIS — Z00.00 ROUTINE GENERAL MEDICAL EXAMINATION AT A HEALTH CARE FACILITY: Primary | ICD-10-CM

## 2019-06-26 DIAGNOSIS — Z12.11 SCREEN FOR COLON CANCER: ICD-10-CM

## 2019-06-26 DIAGNOSIS — E66.9 DIABETES MELLITUS TYPE 2 IN OBESE (HCC): ICD-10-CM

## 2019-06-26 DIAGNOSIS — E11.9 TYPE 2 DIABETES MELLITUS WITHOUT COMPLICATION, WITHOUT LONG-TERM CURRENT USE OF INSULIN (HCC): ICD-10-CM

## 2019-06-26 DIAGNOSIS — M25.511 ACUTE PAIN OF RIGHT SHOULDER: ICD-10-CM

## 2019-06-26 DIAGNOSIS — E11.59 HYPERTENSION ASSOCIATED WITH DIABETES (HCC): ICD-10-CM

## 2019-06-26 DIAGNOSIS — M17.10 PRIMARY LOCALIZED OSTEOARTHRITIS OF KNEE: ICD-10-CM

## 2019-06-26 DIAGNOSIS — E78.5 HYPERLIPIDEMIA ASSOCIATED WITH TYPE 2 DIABETES MELLITUS (HCC): ICD-10-CM

## 2019-06-26 DIAGNOSIS — E11.69 HYPERLIPIDEMIA ASSOCIATED WITH TYPE 2 DIABETES MELLITUS (HCC): ICD-10-CM

## 2019-06-26 DIAGNOSIS — Z12.31 VISIT FOR SCREENING MAMMOGRAM: ICD-10-CM

## 2019-06-26 DIAGNOSIS — G89.29 CHRONIC MIDLINE LOW BACK PAIN WITHOUT SCIATICA: ICD-10-CM

## 2019-06-26 DIAGNOSIS — E11.69 DIABETES MELLITUS TYPE 2 IN OBESE (HCC): ICD-10-CM

## 2019-06-26 DIAGNOSIS — E66.09 CLASS 1 OBESITY DUE TO EXCESS CALORIES WITH SERIOUS COMORBIDITY AND BODY MASS INDEX (BMI) OF 31.0 TO 31.9 IN ADULT: ICD-10-CM

## 2019-06-26 DIAGNOSIS — M54.50 CHRONIC MIDLINE LOW BACK PAIN WITHOUT SCIATICA: ICD-10-CM

## 2019-06-26 DIAGNOSIS — M18.11 ARTHRITIS OF CARPOMETACARPAL (CMC) JOINT OF RIGHT THUMB: ICD-10-CM

## 2019-06-26 DIAGNOSIS — Z85.3 HISTORY OF LEFT BREAST CANCER: ICD-10-CM

## 2019-06-26 PROCEDURE — G0439 PPPS, SUBSEQ VISIT: HCPCS | Performed by: INTERNAL MEDICINE

## 2019-06-26 PROCEDURE — 99397 PER PM REEVAL EST PAT 65+ YR: CPT | Performed by: INTERNAL MEDICINE

## 2019-06-26 PROCEDURE — 96160 PT-FOCUSED HLTH RISK ASSMT: CPT | Performed by: INTERNAL MEDICINE

## 2019-06-26 RX ORDER — MULTIVITAMIN/IRON/FOLIC ACID 18MG-0.4MG
TABLET ORAL
COMMUNITY
Start: 2019-05-20 | End: 2019-11-04

## 2019-06-26 NOTE — PROGRESS NOTES
Yessenia Yoon is a 77year old female. HPI:   Patient presents for medicare supervisit and additional issues noted below. 1. HTN: she self discontinued her irbesartan 1 month ago for no particular reason. Denies any side effects.  She was not diarrhea, n/v, BRBPR, melena  : no dysuria, frequency, or hematuria  SKIN: denies any unusual skin lesions or rashes  PSYCH: mood is stable.  Denies depression or anxiety  EXT: denies edema       Wt Readings from Last 6 Encounters:  06/26/19 : 165 lb 12 o axillary lad b/l.  Old scar present in left axilla  Bilateral barefoot skin diabetic exam is normal, visualized feet and the appearance is normal.  Bilateral monofilament/sensation of both feet is normal.  Pulsation pedal pulse exam of right lower legs/feet History of L sided breast cancer (s/p chemo, radiation, lumpectomy in 1990s): she needs to continue with yearly mammograms  - treated in 1990s with chemo, radiation and lumpectomy  - patient does not recall primary or secondary site.    # Health Maintenance

## 2019-07-11 ENCOUNTER — OFFICE VISIT (OUTPATIENT)
Dept: OCCUPATIONAL MEDICINE | Age: 69
End: 2019-07-11
Attending: INTERNAL MEDICINE
Payer: MEDICARE

## 2019-07-11 DIAGNOSIS — M18.11 ARTHRITIS OF CARPOMETACARPAL (CMC) JOINT OF RIGHT THUMB: ICD-10-CM

## 2019-07-11 PROCEDURE — 97110 THERAPEUTIC EXERCISES: CPT

## 2019-07-11 PROCEDURE — 97166 OT EVAL MOD COMPLEX 45 MIN: CPT

## 2019-07-11 NOTE — PROGRESS NOTES
OCCUPATIONAL THERAPY UPPER EXTREMITY EVALUATION   Referring Physician: Dr. Cordell Rees  Diagnosis: [facility] arthritis     Date of Service: 7/11/2019     PATIENT SUMMARY   Chely Bales is a 77year old female who presents to therapy today with complaints of helped with the pain. SCAR: NA     SENSORY: Pt is experiencing pain and numbness/tingling througout the hand and up the arm.        CIRCUMFERENTIAL EDEMA (cm):  Right Wrist crease: 15.5  Left Wrist crease: 15.3  Right MCP: 17.9  Left MCP: 17.8    ROM: ( writing. Frequency / Duration: Patient will be seen for 2 x/week or a total of 8 visits over a 90 day period.   Treatment will include: Manual Therapy, Self-Care Home Management, Therapeutic Activities, Therapeutic Exercise, Home Exercise Program instru

## 2019-07-24 ENCOUNTER — OFFICE VISIT (OUTPATIENT)
Dept: PHYSICAL THERAPY | Age: 69
End: 2019-07-24
Attending: INTERNAL MEDICINE
Payer: MEDICARE

## 2019-07-24 DIAGNOSIS — M17.10 PRIMARY LOCALIZED OSTEOARTHRITIS OF KNEE: ICD-10-CM

## 2019-07-24 DIAGNOSIS — G89.29 CHRONIC MIDLINE LOW BACK PAIN WITHOUT SCIATICA: ICD-10-CM

## 2019-07-24 DIAGNOSIS — M54.50 CHRONIC MIDLINE LOW BACK PAIN WITHOUT SCIATICA: ICD-10-CM

## 2019-07-24 DIAGNOSIS — M25.511 ACUTE PAIN OF RIGHT SHOULDER: ICD-10-CM

## 2019-07-24 PROCEDURE — 97163 PT EVAL HIGH COMPLEX 45 MIN: CPT

## 2019-07-24 PROCEDURE — 97110 THERAPEUTIC EXERCISES: CPT

## 2019-07-24 NOTE — PROGRESS NOTES
SPINE EVALUATION:   Referring Physician: Dr. Leatha Hunter  Diagnosis: Chronic midline low back pain without sciatica (M54.5,G89.29)  Primary localized osteoarthritis of knee (M17.10)  Acute pain of right shoulder (M25.511)       Date of Service: 7/24/2019       P Began HEP instruction. Will continue to review next visit. Relief with STM lumbar paraspinals. Also emphasized the importance of a regular stretching/HEP to help prevent reocurrance.  Skilled Physical Therapy is medically necessary to address the above i this evaluation involved High Complexity decision making due to 3+ personal factors/comorbidities, 4+ body structures involved/activity limitations, and unstable symptoms including changing pain levels.   PLAN OF CARE:    Goals: (to be met in 8 visits)   (I

## 2019-07-25 ENCOUNTER — OFFICE VISIT (OUTPATIENT)
Dept: OCCUPATIONAL MEDICINE | Age: 69
End: 2019-07-25
Attending: INTERNAL MEDICINE
Payer: MEDICARE

## 2019-07-25 DIAGNOSIS — M18.11 ARTHRITIS OF CARPOMETACARPAL (CMC) JOINT OF RIGHT THUMB: ICD-10-CM

## 2019-07-25 PROCEDURE — 97140 MANUAL THERAPY 1/> REGIONS: CPT

## 2019-07-25 PROCEDURE — 97760 ORTHOTIC MGMT&TRAING 1ST ENC: CPT

## 2019-07-25 PROCEDURE — 97035 APP MDLTY 1+ULTRASOUND EA 15: CPT

## 2019-07-25 NOTE — PROGRESS NOTES
Dx: CMC arthritis, hand pain        Insurance (Authorized # of Visits):  3           Authorizing Physician: Dr. Shannon Longoria  Next MD visit: none scheduled  Fall Risk: standard         Precautions: n/a             Subjective: \"It's the same. \"    Objective: Pt br

## 2019-07-29 ENCOUNTER — OFFICE VISIT (OUTPATIENT)
Dept: OCCUPATIONAL MEDICINE | Age: 69
End: 2019-07-29
Attending: INTERNAL MEDICINE
Payer: MEDICARE

## 2019-07-29 ENCOUNTER — OFFICE VISIT (OUTPATIENT)
Dept: PHYSICAL THERAPY | Age: 69
End: 2019-07-29
Attending: INTERNAL MEDICINE
Payer: MEDICARE

## 2019-07-29 DIAGNOSIS — M17.10 PRIMARY LOCALIZED OSTEOARTHRITIS OF KNEE: ICD-10-CM

## 2019-07-29 DIAGNOSIS — G89.29 CHRONIC MIDLINE LOW BACK PAIN WITHOUT SCIATICA: ICD-10-CM

## 2019-07-29 DIAGNOSIS — M54.50 CHRONIC MIDLINE LOW BACK PAIN WITHOUT SCIATICA: ICD-10-CM

## 2019-07-29 DIAGNOSIS — M25.511 ACUTE PAIN OF RIGHT SHOULDER: ICD-10-CM

## 2019-07-29 PROCEDURE — 97035 APP MDLTY 1+ULTRASOUND EA 15: CPT

## 2019-07-29 PROCEDURE — 97110 THERAPEUTIC EXERCISES: CPT

## 2019-07-29 PROCEDURE — 97112 NEUROMUSCULAR REEDUCATION: CPT

## 2019-07-29 PROCEDURE — 97140 MANUAL THERAPY 1/> REGIONS: CPT

## 2019-07-29 NOTE — PROGRESS NOTES
Dx: Chronic midline low back pain without sciatica (M54.5,G89.29)  Primary localized osteoarthritis of knee (M17.10)  Acute pain of right shoulder (M25.511)           Insurance (Authorized # of Visits):  Kim 327: Dr. Zach Lawrence MD

## 2019-07-29 NOTE — PROGRESS NOTES
Dx: CMC arthritis, hand pain        Insurance (Authorized # of Visits): Authorizing Physician: Dr. Valerie Draper  Next MD visit: none scheduled  Fall Risk: standard         Precautions: n/a             Subjective: \"The pain is an 8 today. All over. \"

## 2019-07-30 ENCOUNTER — APPOINTMENT (OUTPATIENT)
Dept: OCCUPATIONAL MEDICINE | Age: 69
End: 2019-07-30
Attending: INTERNAL MEDICINE
Payer: MEDICARE

## 2019-07-31 ENCOUNTER — OFFICE VISIT (OUTPATIENT)
Dept: PHYSICAL THERAPY | Age: 69
End: 2019-07-31
Attending: INTERNAL MEDICINE
Payer: MEDICARE

## 2019-07-31 DIAGNOSIS — M54.50 CHRONIC MIDLINE LOW BACK PAIN WITHOUT SCIATICA: ICD-10-CM

## 2019-07-31 DIAGNOSIS — M17.10 PRIMARY LOCALIZED OSTEOARTHRITIS OF KNEE: ICD-10-CM

## 2019-07-31 DIAGNOSIS — G89.29 CHRONIC MIDLINE LOW BACK PAIN WITHOUT SCIATICA: ICD-10-CM

## 2019-07-31 DIAGNOSIS — M25.511 ACUTE PAIN OF RIGHT SHOULDER: ICD-10-CM

## 2019-07-31 PROCEDURE — 97140 MANUAL THERAPY 1/> REGIONS: CPT

## 2019-07-31 PROCEDURE — 97110 THERAPEUTIC EXERCISES: CPT

## 2019-07-31 NOTE — PROGRESS NOTES
Dx: Chronic midline low back pain without sciatica (M54.5,G89.29)  Primary localized osteoarthritis of knee (M17.10)  Acute pain of right shoulder (M25.511)           Insurance (Authorized # of Visits):  Kim 327: Dr. Alvaro Lawrence MD with bilat UE    Slant board calf stretch   Lunge calf stretch instructed for home  Shuttle DL 37 + 6 lbs 15x2        SL 25 lbs 15x each      NR  Quad sets training -->  SLR instructed for home  Bridge 10x  Manual STM lumbar PVM, PA's grade II.  Lumbar rot

## 2019-08-02 ENCOUNTER — APPOINTMENT (OUTPATIENT)
Dept: PHYSICAL THERAPY | Age: 69
End: 2019-08-02
Attending: INTERNAL MEDICINE
Payer: MEDICARE

## 2019-08-02 ENCOUNTER — OFFICE VISIT (OUTPATIENT)
Dept: OCCUPATIONAL MEDICINE | Age: 69
End: 2019-08-02
Attending: INTERNAL MEDICINE
Payer: MEDICARE

## 2019-08-02 PROCEDURE — 97140 MANUAL THERAPY 1/> REGIONS: CPT

## 2019-08-02 PROCEDURE — 97035 APP MDLTY 1+ULTRASOUND EA 15: CPT

## 2019-08-02 PROCEDURE — 97110 THERAPEUTIC EXERCISES: CPT

## 2019-08-02 NOTE — PROGRESS NOTES
Dx: Chronic midline low back pain without sciatica (M54.5,G89.29)  Primary localized osteoarthritis of knee (M17.10)  Acute pain of right shoulder (M25.511)           Insurance (Authorized # of Visits):  Kim 327: Dr. Gurmeet Lawrence MD TX#: 5/ Date:    Tx#: 6/   TE  Seated stepper 5'  Doorway stretch 5x   LTR 10x  -added shoulder 90/90 positioning  SKTC 10x  Heel slide 3x           TE  Seated stepper 10'  started prior to session  6\" FSU, LSU 15x each with bilat UE    Slant board calf

## 2019-08-02 NOTE — PROGRESS NOTES
Dx: CMC arthritis, hand pain        Insurance (Authorized # of Visits): Authorizing Physician: Dr. Somers Asp ref.  provider found  Next MD visit: none scheduled  Fall Risk: standard         Precautions: n/a             Subjective: \"I'm trying the thumb trigger points.      ROM to thumb and wrist ROM to thumb and wrist ROM to thumb and wrist      Roll/pinch yellow foam cubes Yellow putty exercises  -/reposition  -roll/tip pinch                   HEP: splinting addition    Charges: 1 TE, 1 MT, 1 US

## 2019-08-05 ENCOUNTER — OFFICE VISIT (OUTPATIENT)
Dept: PHYSICAL THERAPY | Age: 69
End: 2019-08-05
Attending: INTERNAL MEDICINE
Payer: MEDICARE

## 2019-08-05 DIAGNOSIS — G89.29 CHRONIC MIDLINE LOW BACK PAIN WITHOUT SCIATICA: ICD-10-CM

## 2019-08-05 DIAGNOSIS — M25.511 ACUTE PAIN OF RIGHT SHOULDER: ICD-10-CM

## 2019-08-05 DIAGNOSIS — M54.50 CHRONIC MIDLINE LOW BACK PAIN WITHOUT SCIATICA: ICD-10-CM

## 2019-08-05 DIAGNOSIS — M17.10 PRIMARY LOCALIZED OSTEOARTHRITIS OF KNEE: ICD-10-CM

## 2019-08-05 PROCEDURE — 97140 MANUAL THERAPY 1/> REGIONS: CPT

## 2019-08-05 PROCEDURE — 97110 THERAPEUTIC EXERCISES: CPT

## 2019-08-07 ENCOUNTER — APPOINTMENT (OUTPATIENT)
Dept: PHYSICAL THERAPY | Age: 69
End: 2019-08-07
Attending: INTERNAL MEDICINE
Payer: MEDICARE

## 2019-08-12 ENCOUNTER — OFFICE VISIT (OUTPATIENT)
Dept: OCCUPATIONAL MEDICINE | Age: 69
End: 2019-08-12
Attending: INTERNAL MEDICINE
Payer: MEDICARE

## 2019-08-12 PROCEDURE — 97035 APP MDLTY 1+ULTRASOUND EA 15: CPT

## 2019-08-12 PROCEDURE — 97110 THERAPEUTIC EXERCISES: CPT

## 2019-08-12 PROCEDURE — 97140 MANUAL THERAPY 1/> REGIONS: CPT

## 2019-08-12 NOTE — PROGRESS NOTES
Dx: CMC arthritis, hand pain        Insurance (Authorized # of Visits): Authorizing Physician: Dr. Vicki Galloway  Next MD visit: none scheduled  Fall Risk: standard         Precautions: n/a             Subjective: \"I have a new splint.   It's comfortable thumb and wrist ROM to thumb and wrist ROM to thumb and wrist PREs 1 lb  -wrist flexion x 20  -wrist extension x 20  -RD/UD x 20     Roll/pinch yellow foam cubes Yellow putty exercises  -/reposition  -roll/tip pinch Gripper black level 1 x 20    Latera

## 2019-08-14 ENCOUNTER — OFFICE VISIT (OUTPATIENT)
Dept: PHYSICAL THERAPY | Age: 69
End: 2019-08-14
Attending: INTERNAL MEDICINE
Payer: MEDICARE

## 2019-08-14 DIAGNOSIS — M54.50 CHRONIC MIDLINE LOW BACK PAIN WITHOUT SCIATICA: ICD-10-CM

## 2019-08-14 DIAGNOSIS — M17.10 PRIMARY LOCALIZED OSTEOARTHRITIS OF KNEE: ICD-10-CM

## 2019-08-14 DIAGNOSIS — G89.29 CHRONIC MIDLINE LOW BACK PAIN WITHOUT SCIATICA: ICD-10-CM

## 2019-08-14 DIAGNOSIS — M25.511 ACUTE PAIN OF RIGHT SHOULDER: ICD-10-CM

## 2019-08-14 PROCEDURE — 97110 THERAPEUTIC EXERCISES: CPT

## 2019-08-14 PROCEDURE — 97140 MANUAL THERAPY 1/> REGIONS: CPT

## 2019-08-14 NOTE — PROGRESS NOTES
Dx: Chronic midline low back pain without sciatica (M54.5,G89.29)  Primary localized osteoarthritis of knee (M17.10)  Acute pain of right shoulder (M25.511)           Insurance (Authorized # of Visits):  Kim 327: Dr. Jameson Lawrence MD knee pain  Improved lumbar flexion to enable don/doff socks and shoes without complaints.  --> in progress, morning stiffness  Able to sleep uninterrupted from pain.   --> not consistently met  Active shoulder flex 160 without pain for overhead reach  --> i of heat at home 15'    NR  Quad sets training -->  SLR instructed for home  Bridge 10x  Manual STM lumbar PVM, PA's grade II. Lumbar rot mobe grade II 30\" x3 Manual STM lumbar PVM, PA's grade II.  Lumbar rot mobe grade II 30\" x3 Manual  R GH post/inf  Gli

## 2019-08-16 ENCOUNTER — APPOINTMENT (OUTPATIENT)
Dept: PHYSICAL THERAPY | Age: 69
End: 2019-08-16
Attending: INTERNAL MEDICINE
Payer: MEDICARE

## 2019-08-16 ENCOUNTER — OFFICE VISIT (OUTPATIENT)
Dept: OCCUPATIONAL MEDICINE | Age: 69
End: 2019-08-16
Attending: INTERNAL MEDICINE
Payer: MEDICARE

## 2019-08-16 PROCEDURE — 97140 MANUAL THERAPY 1/> REGIONS: CPT

## 2019-08-16 PROCEDURE — 97035 APP MDLTY 1+ULTRASOUND EA 15: CPT

## 2019-08-16 PROCEDURE — 97110 THERAPEUTIC EXERCISES: CPT

## 2019-08-16 NOTE — PROGRESS NOTES
Dx: CMC arthritis, hand pain        Insurance (Authorized # of Visits): Authorizing Physician: Dr. Irene Flores  Next MD visit: none scheduled  Fall Risk: standard         Precautions: n/a             Subjective: \"When I try to grab something it hurts. extensors STM to thumb and wrist, strumming to trigger points. STM to thumb and wrist, strumming to trigger points   STM to thumb and wrist, strumming to trigger points. STM to thumb and wrist, strumming to trigger points.  STM to thumb and wrist, strummin

## 2019-08-19 ENCOUNTER — TELEPHONE (OUTPATIENT)
Dept: PHYSICAL THERAPY | Age: 69
End: 2019-08-19

## 2019-09-17 DIAGNOSIS — Z13.5 SCREENING FOR DIABETIC RETINOPATHY: ICD-10-CM

## 2019-09-17 DIAGNOSIS — E11.9 TYPE 2 DIABETES MELLITUS WITHOUT COMPLICATION, WITHOUT LONG-TERM CURRENT USE OF INSULIN (HCC): ICD-10-CM

## 2019-09-17 DIAGNOSIS — E66.09 CLASS 1 OBESITY DUE TO EXCESS CALORIES WITH SERIOUS COMORBIDITY AND BODY MASS INDEX (BMI) OF 32.0 TO 32.9 IN ADULT: ICD-10-CM

## 2019-09-17 DIAGNOSIS — E78.2 MIXED HYPERLIPIDEMIA: ICD-10-CM

## 2019-09-18 DIAGNOSIS — E66.09 CLASS 1 OBESITY DUE TO EXCESS CALORIES WITH SERIOUS COMORBIDITY AND BODY MASS INDEX (BMI) OF 32.0 TO 32.9 IN ADULT: ICD-10-CM

## 2019-09-18 DIAGNOSIS — E78.2 MIXED HYPERLIPIDEMIA: ICD-10-CM

## 2019-09-18 DIAGNOSIS — Z13.5 SCREENING FOR DIABETIC RETINOPATHY: ICD-10-CM

## 2019-09-18 DIAGNOSIS — E11.9 TYPE 2 DIABETES MELLITUS WITHOUT COMPLICATION, WITHOUT LONG-TERM CURRENT USE OF INSULIN (HCC): ICD-10-CM

## 2019-09-18 RX ORDER — ATORVASTATIN CALCIUM 20 MG/1
TABLET, FILM COATED ORAL
Qty: 90 TABLET | Refills: 0 | Status: SHIPPED | OUTPATIENT
Start: 2019-09-18 | End: 2020-06-15

## 2019-09-18 NOTE — TELEPHONE ENCOUNTER
ATORVASTATIN 20 MG  And metFORMIN HCl 500 MG Oral Tab    Last OV relevant to medication: 6-     Last refill date:     Atorvastatin 20 mg- # 90 tabs with 3 refills   Metformin  mg- 6- # 90 with  Refills       When pt was asked to

## 2019-09-21 DIAGNOSIS — E78.2 MIXED HYPERLIPIDEMIA: ICD-10-CM

## 2019-09-21 DIAGNOSIS — E11.9 TYPE 2 DIABETES MELLITUS WITHOUT COMPLICATION, WITHOUT LONG-TERM CURRENT USE OF INSULIN (HCC): ICD-10-CM

## 2019-09-21 DIAGNOSIS — E66.09 CLASS 1 OBESITY DUE TO EXCESS CALORIES WITH SERIOUS COMORBIDITY AND BODY MASS INDEX (BMI) OF 32.0 TO 32.9 IN ADULT: ICD-10-CM

## 2019-09-21 DIAGNOSIS — Z13.5 SCREENING FOR DIABETIC RETINOPATHY: ICD-10-CM

## 2019-09-23 NOTE — TELEPHONE ENCOUNTER
ATORVASTATIN 20 MG  And METFORMIN  MG     Last OV relevant to medication: 6-      Last refill date:     Atorvastatin 20 mg - 9-  # 90 tabs with 0 refills     Metformin  mg - 9- # 90 tabs with 0 refills     When pt was ask

## 2019-09-24 RX ORDER — ATORVASTATIN CALCIUM 20 MG/1
TABLET, FILM COATED ORAL
Qty: 90 TABLET | Refills: 0 | Status: SHIPPED | OUTPATIENT
Start: 2019-09-24 | End: 2020-01-29

## 2019-10-23 ENCOUNTER — TELEPHONE (OUTPATIENT)
Dept: INTERNAL MEDICINE CLINIC | Facility: CLINIC | Age: 69
End: 2019-10-23

## 2019-10-23 DIAGNOSIS — M18.11 ARTHRITIS OF CARPOMETACARPAL (CMC) JOINT OF RIGHT THUMB: Primary | ICD-10-CM

## 2019-10-23 NOTE — TELEPHONE ENCOUNTER
Patient called in to request a new referral for an Orthopedic Surgeon. Patient stated that it \"didn't work out\" with the Orthopedic surgeon she was referred to on 06/26/19 and would like a new recommendation from Dr. Aamir Varghese.      Please call back patient wi

## 2019-10-24 ENCOUNTER — APPOINTMENT (OUTPATIENT)
Dept: LAB | Age: 69
End: 2019-10-24
Attending: INTERNAL MEDICINE
Payer: MEDICARE

## 2019-10-24 ENCOUNTER — HOSPITAL ENCOUNTER (OUTPATIENT)
Dept: MAMMOGRAPHY | Age: 69
Discharge: HOME | End: 2019-10-24
Attending: INTERNAL MEDICINE
Payer: MEDICARE

## 2019-10-24 DIAGNOSIS — Z12.11 SCREEN FOR COLON CANCER: ICD-10-CM

## 2019-10-24 DIAGNOSIS — Z00.00 ROUTINE GENERAL MEDICAL EXAMINATION AT A HEALTH CARE FACILITY: ICD-10-CM

## 2019-10-24 DIAGNOSIS — Z12.31 VISIT FOR SCREENING MAMMOGRAM: ICD-10-CM

## 2019-10-24 DIAGNOSIS — M17.10 PRIMARY LOCALIZED OSTEOARTHRITIS OF KNEE: ICD-10-CM

## 2019-10-24 DIAGNOSIS — G89.29 CHRONIC MIDLINE LOW BACK PAIN WITHOUT SCIATICA: ICD-10-CM

## 2019-10-24 DIAGNOSIS — M18.11 ARTHRITIS OF CARPOMETACARPAL (CMC) JOINT OF RIGHT THUMB: ICD-10-CM

## 2019-10-24 DIAGNOSIS — M25.511 ACUTE PAIN OF RIGHT SHOULDER: ICD-10-CM

## 2019-10-24 DIAGNOSIS — M54.50 CHRONIC MIDLINE LOW BACK PAIN WITHOUT SCIATICA: ICD-10-CM

## 2019-10-24 PROCEDURE — 83036 HEMOGLOBIN GLYCOSYLATED A1C: CPT

## 2019-10-24 PROCEDURE — 36415 COLL VENOUS BLD VENIPUNCTURE: CPT

## 2019-10-24 PROCEDURE — 84450 TRANSFERASE (AST) (SGOT): CPT

## 2019-10-24 PROCEDURE — 77067 SCR MAMMO BI INCL CAD: CPT | Performed by: INTERNAL MEDICINE

## 2019-10-24 PROCEDURE — 77063 BREAST TOMOSYNTHESIS BI: CPT | Performed by: INTERNAL MEDICINE

## 2019-10-24 PROCEDURE — 84460 ALANINE AMINO (ALT) (SGPT): CPT

## 2019-10-24 PROCEDURE — 80061 LIPID PANEL: CPT

## 2019-10-24 PROCEDURE — 80048 BASIC METABOLIC PNL TOTAL CA: CPT

## 2019-10-24 NOTE — TELEPHONE ENCOUNTER
Referral entered and approved through pts insurance company in [address] [place] [address]. Pt notified.

## 2019-11-04 PROBLEM — M65.4 DE QUERVAIN'S TENOSYNOVITIS, RIGHT: Status: ACTIVE | Noted: 2019-11-04

## 2019-12-04 RX ORDER — IRBESARTAN 75 MG/1
TABLET ORAL
Qty: 90 TABLET | Refills: 0 | Status: SHIPPED | OUTPATIENT
Start: 2019-12-04 | End: 2020-03-13

## 2019-12-04 NOTE — TELEPHONE ENCOUNTER
IRBESARTAN 75 MG Oral Tab    Last OV relevant to medication: 6-     Last refill date:  11-# 90 tabs with 1 refill     When pt was asked to return for OV: 8 weeks     Upcoming appt/reason: none     Labs: 10/24/2019     # HTN associated with DM

## 2020-01-29 ENCOUNTER — OFFICE VISIT (OUTPATIENT)
Dept: INTERNAL MEDICINE CLINIC | Facility: CLINIC | Age: 70
End: 2020-01-29
Payer: MEDICARE

## 2020-01-29 VITALS
BODY MASS INDEX: 33.18 KG/M2 | TEMPERATURE: 98 F | WEIGHT: 169 LBS | HEART RATE: 70 BPM | DIASTOLIC BLOOD PRESSURE: 64 MMHG | OXYGEN SATURATION: 98 % | SYSTOLIC BLOOD PRESSURE: 116 MMHG | RESPIRATION RATE: 16 BRPM | HEIGHT: 60 IN

## 2020-01-29 DIAGNOSIS — E11.9 TYPE 2 DIABETES MELLITUS WITHOUT COMPLICATION, WITHOUT LONG-TERM CURRENT USE OF INSULIN (HCC): ICD-10-CM

## 2020-01-29 DIAGNOSIS — M17.10 PRIMARY LOCALIZED OSTEOARTHRITIS OF KNEE: ICD-10-CM

## 2020-01-29 DIAGNOSIS — E11.69 HYPERLIPIDEMIA ASSOCIATED WITH TYPE 2 DIABETES MELLITUS (HCC): ICD-10-CM

## 2020-01-29 DIAGNOSIS — E11.59 HYPERTENSION ASSOCIATED WITH DIABETES (HCC): ICD-10-CM

## 2020-01-29 DIAGNOSIS — E11.69 DIABETES MELLITUS TYPE 2 IN OBESE (HCC): ICD-10-CM

## 2020-01-29 DIAGNOSIS — R09.89 ABSENT PEDAL PULSES: ICD-10-CM

## 2020-01-29 DIAGNOSIS — I15.2 HYPERTENSION ASSOCIATED WITH DIABETES (HCC): ICD-10-CM

## 2020-01-29 DIAGNOSIS — E78.5 HYPERLIPIDEMIA ASSOCIATED WITH TYPE 2 DIABETES MELLITUS (HCC): ICD-10-CM

## 2020-01-29 DIAGNOSIS — M65.4 DE QUERVAIN'S TENOSYNOVITIS, RIGHT: ICD-10-CM

## 2020-01-29 DIAGNOSIS — E66.9 DIABETES MELLITUS TYPE 2 IN OBESE (HCC): ICD-10-CM

## 2020-01-29 DIAGNOSIS — Z00.00 ROUTINE GENERAL MEDICAL EXAMINATION AT A HEALTH CARE FACILITY: Primary | ICD-10-CM

## 2020-01-29 PROCEDURE — 99397 PER PM REEVAL EST PAT 65+ YR: CPT | Performed by: INTERNAL MEDICINE

## 2020-01-29 PROCEDURE — 96160 PT-FOCUSED HLTH RISK ASSMT: CPT | Performed by: INTERNAL MEDICINE

## 2020-01-29 PROCEDURE — G0439 PPPS, SUBSEQ VISIT: HCPCS | Performed by: INTERNAL MEDICINE

## 2020-01-29 NOTE — PATIENT INSTRUCTIONS
You are overdue for your diabetic eye exam. Please complete this as soon as possible. You are due for your labs in April, 2020. You need 3 eight ounce servings of calcium/vitamin D daily.  This includes spinach, kale, broccoli, almonds, milk, yogurt,

## 2020-01-29 NOTE — PROGRESS NOTES
Baljinder Landa is a 77year old female. HPI:   Patient presents for medicare supervisit and additional issues noted below. 1. DM: taking metformin at night. Tolerating it well. Home FBS are <130s. Denies hypoglycemia.    2. HLP: tolerating irbe Diabetes Brother    • Breast Cancer Self         49      Past Medical History:   Diagnosis Date   • Breast CA ([facility] [address] 1990    left breast   • Breast cancer in female [facility]) 1990    s/p chemo and radiation   • Diabetes ([facility] [address]    • High blood pressure    • High medicare. No claudication symptoms. ABIs ordered. # HTN associated with DM: well controlled on irbesartan  # HLP associated with DM: well controlled on atorvastatin, Low LDL in 10/2019  # Type 2 Diabetes in Obese: A1C controlled in 10/2019.  Discussed imp Screening: neg cytology, HPV in 1/2017. Does not need repeat. Breast Cancer Screening: cont yearly mammogram  Bone Health: normal DEXA in 2016. educated on dietary calcium/vit D3, weight bearing exercises, fall prevention.   Vaccines: advised on annual fl

## 2020-01-30 ENCOUNTER — PATIENT OUTREACH (OUTPATIENT)
Dept: CASE MANAGEMENT | Age: 70
End: 2020-01-30

## 2020-01-30 NOTE — PROGRESS NOTES
Spoke with patient in detail regarding CCM patient requests information to be mailed so she may read it over. Mailed.

## 2020-02-17 PROBLEM — M25.531 RIGHT WRIST PAIN: Status: ACTIVE | Noted: 2020-02-17

## 2020-02-20 ENCOUNTER — HOSPITAL ENCOUNTER (OUTPATIENT)
Dept: ULTRASOUND IMAGING | Facility: HOSPITAL | Age: 70
Discharge: HOME | End: 2020-02-20
Attending: INTERNAL MEDICINE
Payer: MEDICARE

## 2020-02-20 DIAGNOSIS — R09.89 ABSENT PEDAL PULSES: ICD-10-CM

## 2020-02-20 PROCEDURE — 93922 UPR/L XTREMITY ART 2 LEVELS: CPT | Performed by: INTERNAL MEDICINE

## 2020-02-21 ENCOUNTER — TELEPHONE (OUTPATIENT)
Dept: INTERNAL MEDICINE CLINIC | Facility: CLINIC | Age: 70
End: 2020-02-21

## 2020-02-21 NOTE — TELEPHONE ENCOUNTER
Pts  stated that pt received a bill from Dr Estephania Keita office for DOS 11/4/19 and when he contacted Dr Estephania Keita office, he was told that pt did not have a referral for this DOS.      I explained to pts  [place] that we issued a referral to Dr Tasha Bernardo'

## 2020-03-13 DIAGNOSIS — E11.9 TYPE 2 DIABETES MELLITUS WITHOUT COMPLICATION, WITHOUT LONG-TERM CURRENT USE OF INSULIN (HCC): ICD-10-CM

## 2020-03-13 DIAGNOSIS — E78.2 MIXED HYPERLIPIDEMIA: ICD-10-CM

## 2020-03-13 DIAGNOSIS — Z13.5 SCREENING FOR DIABETIC RETINOPATHY: ICD-10-CM

## 2020-03-13 DIAGNOSIS — E66.09 CLASS 1 OBESITY DUE TO EXCESS CALORIES WITH SERIOUS COMORBIDITY AND BODY MASS INDEX (BMI) OF 32.0 TO 32.9 IN ADULT: ICD-10-CM

## 2020-03-13 NOTE — TELEPHONE ENCOUNTER
Patient calling in requesting a refill for RX:  metFORMIN HCl 500 MG Oral Tab  IRBESARTAN 75 MG Oral Tab      Pharmacy:  [address] [place] [address], [facility]  [phone] [facility] [phone], [phone]

## 2020-03-16 RX ORDER — IRBESARTAN 75 MG/1
TABLET ORAL
Qty: 90 TABLET | Refills: 0 | Status: SHIPPED | OUTPATIENT
Start: 2020-03-16 | End: 2020-06-16

## 2020-03-16 NOTE — TELEPHONE ENCOUNTER
Irbesartan 75 MG Oral Tab    Passed Protocol    Last OV relevant to medication: 1/29/2020  Last refill date: 12/4/2019     #/refills: #90 w/ 0 refills   When pt was asked to return for OV: 6 months  Upcoming appt/reason: 5/4/2020  Lab Results   Component V

## 2020-06-10 ENCOUNTER — OFFICE VISIT (OUTPATIENT)
Dept: INTERNAL MEDICINE CLINIC | Facility: CLINIC | Age: 70
End: 2020-06-10
Payer: MEDICARE

## 2020-06-10 ENCOUNTER — APPOINTMENT (OUTPATIENT)
Dept: LAB | Age: 70
End: 2020-06-10
Attending: INTERNAL MEDICINE
Payer: MEDICARE

## 2020-06-10 VITALS
RESPIRATION RATE: 16 BRPM | HEIGHT: 60 IN | BODY MASS INDEX: 32.39 KG/M2 | WEIGHT: 165 LBS | DIASTOLIC BLOOD PRESSURE: 66 MMHG | HEART RATE: 66 BPM | SYSTOLIC BLOOD PRESSURE: 124 MMHG | TEMPERATURE: 98 F | OXYGEN SATURATION: 98 %

## 2020-06-10 DIAGNOSIS — E11.69 HYPERLIPIDEMIA ASSOCIATED WITH TYPE 2 DIABETES MELLITUS (HCC): ICD-10-CM

## 2020-06-10 DIAGNOSIS — Z12.31 VISIT FOR SCREENING MAMMOGRAM: ICD-10-CM

## 2020-06-10 DIAGNOSIS — E11.59 HYPERTENSION ASSOCIATED WITH DIABETES (HCC): ICD-10-CM

## 2020-06-10 DIAGNOSIS — E66.9 DIABETES MELLITUS TYPE 2 IN OBESE (HCC): Primary | ICD-10-CM

## 2020-06-10 DIAGNOSIS — E11.69 DIABETES MELLITUS TYPE 2 IN OBESE (HCC): ICD-10-CM

## 2020-06-10 DIAGNOSIS — E78.5 HYPERLIPIDEMIA ASSOCIATED WITH TYPE 2 DIABETES MELLITUS (HCC): ICD-10-CM

## 2020-06-10 DIAGNOSIS — R20.2 RIGHT HAND PARESTHESIA: ICD-10-CM

## 2020-06-10 DIAGNOSIS — M17.10 PRIMARY LOCALIZED OSTEOARTHRITIS OF KNEE: ICD-10-CM

## 2020-06-10 DIAGNOSIS — M54.31 RIGHT SCIATIC NERVE PAIN: ICD-10-CM

## 2020-06-10 DIAGNOSIS — E66.9 DIABETES MELLITUS TYPE 2 IN OBESE (HCC): ICD-10-CM

## 2020-06-10 DIAGNOSIS — Z00.00 ROUTINE GENERAL MEDICAL EXAMINATION AT A HEALTH CARE FACILITY: ICD-10-CM

## 2020-06-10 DIAGNOSIS — M65.4 DE QUERVAIN'S TENOSYNOVITIS, RIGHT: ICD-10-CM

## 2020-06-10 DIAGNOSIS — I15.2 HYPERTENSION ASSOCIATED WITH DIABETES (HCC): ICD-10-CM

## 2020-06-10 DIAGNOSIS — E11.9 TYPE 2 DIABETES MELLITUS WITHOUT COMPLICATION, WITHOUT LONG-TERM CURRENT USE OF INSULIN (HCC): ICD-10-CM

## 2020-06-10 DIAGNOSIS — E11.69 DIABETES MELLITUS TYPE 2 IN OBESE (HCC): Primary | ICD-10-CM

## 2020-06-10 PROCEDURE — 82570 ASSAY OF URINE CREATININE: CPT

## 2020-06-10 PROCEDURE — 84450 TRANSFERASE (AST) (SGOT): CPT

## 2020-06-10 PROCEDURE — 82607 VITAMIN B-12: CPT

## 2020-06-10 PROCEDURE — 80048 BASIC METABOLIC PNL TOTAL CA: CPT

## 2020-06-10 PROCEDURE — 82043 UR ALBUMIN QUANTITATIVE: CPT

## 2020-06-10 PROCEDURE — 84460 ALANINE AMINO (ALT) (SGPT): CPT

## 2020-06-10 PROCEDURE — 83036 HEMOGLOBIN GLYCOSYLATED A1C: CPT

## 2020-06-10 PROCEDURE — 99214 OFFICE O/P EST MOD 30 MIN: CPT | Performed by: INTERNAL MEDICINE

## 2020-06-10 PROCEDURE — 36415 COLL VENOUS BLD VENIPUNCTURE: CPT

## 2020-06-10 NOTE — PATIENT INSTRUCTIONS
Please complete your labs TODAY. You are overdue for a diabetic eye exam. Please schedule this as soon as possible. You are due for your mammogram in November, 2020. You can use 400-800 mg of ibuprofen 3-4 times daily.  You should not exceed 240 gets better with medicine, exercise, and physical therapy. If your symptoms continue after medical treatment, you may need surgery or injections to your lower back, depending on how severe your symptoms are.   Home care  Follow these tips when caring for yo legs or hips  · Numbness in your groin or genital area  · You can’t control your bowel or bladder  · Fever (100.4 F or 38 C)  · Redness or swelling over your back or spine   [place] last reviewed this educational content on 9/1/2019  © 2254-2299 [employer]  Follow up with your healthcare provider or as advised by our staff.   When to seek medical advice  Call your healthcare provider right away if you have any of the following:  · Increasing arm swelling or pain  · Numbness or weakness of the arm  · [facility]

## 2020-06-10 NOTE — PROGRESS NOTES
Sosa Farrar is a 72year old female. HPI:   Patient presents for the following issues. 1. DM: doing well on metformin. Checks her FBS a few times per week. FBS are < 130s. 2. HTN: tolerating irbesartan. Checking pressures once per week.  U Relation Age of Onset   • Diabetes Mother         with ESRD.    • Diabetes Brother    • Breast Cancer Self         49      Past Medical History:   Diagnosis Date   • Breast CA ([facility] [address] 1990    left breast   • Breast cancer in female [facility]) 1990    s/p chemo and screen through medicare. No claudication symptoms. ABIs were normal in 2019 but did show small vessel changes in both toes. Continue risk factor management.    # HTN associated with DM: well controlled on irbesartan  # HLP associated with DM: well controlle declines. Agrees to cologuard. Cervical Cancer Screening: neg cytology, HPV in 1/2017. Does not need repeat. Breast Cancer Screening: cont yearly mammogram  Bone Health: normal DEXA in 2016.  educated on dietary calcium/vit D3, weight bearing exercises,

## 2020-06-15 DIAGNOSIS — E78.2 MIXED HYPERLIPIDEMIA: ICD-10-CM

## 2020-06-15 DIAGNOSIS — Z13.5 SCREENING FOR DIABETIC RETINOPATHY: ICD-10-CM

## 2020-06-15 DIAGNOSIS — E11.9 TYPE 2 DIABETES MELLITUS WITHOUT COMPLICATION, WITHOUT LONG-TERM CURRENT USE OF INSULIN (HCC): ICD-10-CM

## 2020-06-15 DIAGNOSIS — E66.09 CLASS 1 OBESITY DUE TO EXCESS CALORIES WITH SERIOUS COMORBIDITY AND BODY MASS INDEX (BMI) OF 32.0 TO 32.9 IN ADULT: ICD-10-CM

## 2020-06-15 RX ORDER — ATORVASTATIN CALCIUM 20 MG/1
20 TABLET, FILM COATED ORAL NIGHTLY
Qty: 90 TABLET | Refills: 0 | Status: CANCELLED | OUTPATIENT
Start: 2020-06-15

## 2020-06-16 RX ORDER — ATORVASTATIN CALCIUM 20 MG/1
20 TABLET, FILM COATED ORAL NIGHTLY
Qty: 90 TABLET | Refills: 0 | Status: SHIPPED | OUTPATIENT
Start: 2020-06-16 | End: 2020-09-16

## 2020-06-16 RX ORDER — IRBESARTAN 75 MG/1
TABLET ORAL
Qty: 90 TABLET | Refills: 2 | Status: SHIPPED | OUTPATIENT
Start: 2020-06-16 | End: 2021-02-17

## 2020-06-16 NOTE — TELEPHONE ENCOUNTER
Metformin 500 mg 1 tab daily filled 3-16-20 90 with 0 refills   Irbesartan 75 mg 1 tab daily filled 3-16-20 90 with 0 refills     LOV 6-10-20   return in January, 2021 for MAS.   No upcoming apt on file   Labs 6-10-20

## 2020-06-16 NOTE — TELEPHONE ENCOUNTER
Atorvastatin 20 mg 1 tab daily filled 9-18-19  90 with 0 refills     LOV 6-10-20   return in January, 2021 for MAS.   No upcoming apt on file   Labs 10-24-19

## 2020-06-29 ENCOUNTER — OFFICE VISIT (OUTPATIENT)
Dept: PHYSICAL THERAPY | Age: 70
End: 2020-06-29
Attending: INTERNAL MEDICINE
Payer: MEDICARE

## 2020-06-29 DIAGNOSIS — M54.31 RIGHT SCIATIC NERVE PAIN: ICD-10-CM

## 2020-06-29 PROCEDURE — 97140 MANUAL THERAPY 1/> REGIONS: CPT

## 2020-06-29 PROCEDURE — 97110 THERAPEUTIC EXERCISES: CPT

## 2020-06-29 PROCEDURE — 97162 PT EVAL MOD COMPLEX 30 MIN: CPT

## 2020-06-29 NOTE — PROGRESS NOTES
SPINE EVALUATION:   Referring Physician: Dr. Shelli Leal  Diagnosis: Right LE pain   Date of Service: 6/29/2020     PATIENT SUMMARY   Baljinder Landa is a 72year old female who presents to therapy today with complaints of 2 moths h/o right leg pain, init evaluation findings, pathology, POC and HEP. Pt voiced understanding and performs HEP correctly without reported pain. Skilled Physical Therapy is medically necessary to address the above impairments and reach functional goals.      Precautions:  Cancer  O MT 1      Total Timed Treatment: 30 min     Total Treatment Time: 60 min     Based on clinical rationale and outcome measures, this evaluation involved Moderate Complexity decision making due to 1-2 personal factors/comorbidities, 3 body structures involve

## 2020-07-01 ENCOUNTER — OFFICE VISIT (OUTPATIENT)
Dept: PHYSICAL THERAPY | Age: 70
End: 2020-07-01
Attending: INTERNAL MEDICINE
Payer: MEDICARE

## 2020-07-01 PROCEDURE — 97110 THERAPEUTIC EXERCISES: CPT

## 2020-07-01 PROCEDURE — 97140 MANUAL THERAPY 1/> REGIONS: CPT

## 2020-07-01 NOTE — PROGRESS NOTES
Dx: R LE pain         Insurance (Authorized # of Visits):  6           Authorizing Physician: Dr. Sharyn Lawrence MD visit: none scheduled  Fall Risk: standard         Precautions: Cancer             Subjective: Pain in the right leg from the upper thigh to t 45 min  Total Treatment Time: 45 min

## 2020-07-06 ENCOUNTER — OFFICE VISIT (OUTPATIENT)
Dept: PHYSICAL THERAPY | Age: 70
End: 2020-07-06
Attending: INTERNAL MEDICINE
Payer: MEDICARE

## 2020-07-06 PROCEDURE — 97110 THERAPEUTIC EXERCISES: CPT

## 2020-07-06 PROCEDURE — 97140 MANUAL THERAPY 1/> REGIONS: CPT

## 2020-07-06 NOTE — PROGRESS NOTES
Dx: R LE pain         Insurance (Authorized # of Visits):  6           Authorizing Physician: Dr. Rosalba Dawn  Next MD visit: none scheduled  Fall Risk: standard         Precautions: Cancer             Subjective: Pain in the right leg 9/10 today from the upper abd R x 10        Supine SAQ over roll R 2 x 10  Manual hamstring stretch R 30 sec hold  Supine ankle pumps R x 20  STM R calf 5 mins  Prone lying manual quads stretch R 3 x 10 sec hold  STM post thigh and calf 5 mins Side lying lumbar spine rotation mobil

## 2020-07-08 ENCOUNTER — OFFICE VISIT (OUTPATIENT)
Dept: PHYSICAL THERAPY | Age: 70
End: 2020-07-08
Attending: INTERNAL MEDICINE
Payer: MEDICARE

## 2020-07-08 PROCEDURE — 97110 THERAPEUTIC EXERCISES: CPT

## 2020-07-08 PROCEDURE — 97140 MANUAL THERAPY 1/> REGIONS: CPT

## 2020-07-08 NOTE — PROGRESS NOTES
Dx: R LE pain         Insurance (Authorized # of Visits):  6           Authorizing Physician: Dr. Kyle Lawrence MD visit: none scheduled  Fall Risk: standard         Precautions: Cancer             Subjective: Pain in the right leg 8/10 today from the upper PA reviewed discharge instructions and options with patient; patient verbalized understanding. Pt. Ambulated to exit without difficulty and in no signs of acute distress. Patient was counseled on concussion symptoms and management before discharge. Supine single leg pull grade 3 5 mins Single leg pull distraction R grade 3 5 mins  Passive hip/knee flexion x 10     Hook lying bent leg lift R/L x 10  Bridging x 10  LTR x 10  Hip abd with blue tband x 20  Add squeeze with ball x 20 Hook lying:

## 2020-07-13 ENCOUNTER — OFFICE VISIT (OUTPATIENT)
Dept: PHYSICAL THERAPY | Age: 70
End: 2020-07-13
Attending: INTERNAL MEDICINE
Payer: MEDICARE

## 2020-07-13 PROCEDURE — 97110 THERAPEUTIC EXERCISES: CPT

## 2020-07-13 PROCEDURE — 97140 MANUAL THERAPY 1/> REGIONS: CPT

## 2020-07-13 NOTE — PROGRESS NOTES
Dx: R LE pain         Insurance (Authorized # of Visits):  6           Authorizing Physician: Dr. Tammie Bills  Next MD visit: none scheduled  Fall Risk: standard         Precautions: Cancer             Subjective: Less pain in the hip, knee and calf for 2 days without DF R/L 3 x 10 sec hold Supine SKTC R/L 30 sec hold  Single leg pull distraction mobilizations L intermittent 5 mins  Hook lying bent leg lift with abd bracing R/L x 10  Hook lying bridging x 14  Hamstring stretch R/L 3 x 10 sec hold    Supine singl min  Total Treatment Time: 45 min

## 2020-07-15 ENCOUNTER — OFFICE VISIT (OUTPATIENT)
Dept: PHYSICAL THERAPY | Age: 70
End: 2020-07-15
Attending: INTERNAL MEDICINE
Payer: MEDICARE

## 2020-07-15 PROCEDURE — 97110 THERAPEUTIC EXERCISES: CPT

## 2020-07-15 PROCEDURE — 97140 MANUAL THERAPY 1/> REGIONS: CPT

## 2020-07-16 NOTE — PROGRESS NOTES
Dx: R LE pain         Insurance (Authorized # of Visits):  6           Authorizing Physician: Dr. Jd Lawrence MD visit: none scheduled  Fall Risk: standard         Precautions: Cancer             Subjective: Less pain in the hip, knee and calf for 2 days Tx#: 6/8   Supine passive R hip/knee flex/ext x 10 Hook lying LTR x 10  SKTC stretch R/L 3 x 10 sec hold  R leg nerve sliders hip flexed perform knee ext x 10 Hook lying SKTC stretch R/L 3 x 10 sec hold  LTR x 10  Bent leg lift R/L x 10  Bridging x 10  H manual quads stretch R 3 x 10 sec hold  STM post thigh and calf 5 mins Side lying lumbar spine rotation mobilizations grade 2-3 2 mins   Prone lying R knee flexion x 10  Manual quads stretch R 3 x 10 sec hold  STM lower lumbar spine and R posterior thigh a

## 2020-07-29 ENCOUNTER — OFFICE VISIT (OUTPATIENT)
Dept: PHYSICAL THERAPY | Age: 70
End: 2020-07-29
Attending: INTERNAL MEDICINE
Payer: MEDICARE

## 2020-07-29 ENCOUNTER — APPOINTMENT (OUTPATIENT)
Dept: PHYSICAL THERAPY | Age: 70
End: 2020-07-29
Attending: INTERNAL MEDICINE
Payer: MEDICARE

## 2020-07-29 PROCEDURE — 97110 THERAPEUTIC EXERCISES: CPT

## 2020-07-29 PROCEDURE — 97140 MANUAL THERAPY 1/> REGIONS: CPT

## 2020-07-29 NOTE — PROGRESS NOTES
Dx: R LE pain         Insurance (Authorized # of Visits):  6           Authorizing Physician: Dr. Gaby Ladd  Next MD visit: none scheduled  Fall Risk: standard         Precautions: Cancer             Subjective: Less pain in the hip, pain in the right leg 6/1 mobilizations L intermittent 5 mins  Hook lying bent leg lift with abd bracing R/L x 10  Hook lying bridging x 14  Hamstring stretch R/L 3 x 10 sec hold Supine single leg pull distraction mobilization grade 3 5 mins  Manual hamstring stretch R 3 x 20 sec h with pillow behind knees MHP R knee/calf 10 mins   Supine SAQ over roll R 2 x 10  Manual hamstring stretch R 30 sec hold  Supine ankle pumps R x 20  STM R calf 5 mins  Prone lying manual quads stretch R 3 x 10 sec hold  STM post thigh and calf 5 mins Side

## 2020-08-04 ENCOUNTER — OFFICE VISIT (OUTPATIENT)
Dept: PHYSICAL THERAPY | Age: 70
End: 2020-08-04
Attending: INTERNAL MEDICINE
Payer: MEDICARE

## 2020-08-04 PROCEDURE — 97140 MANUAL THERAPY 1/> REGIONS: CPT

## 2020-08-04 PROCEDURE — 97530 THERAPEUTIC ACTIVITIES: CPT

## 2020-08-04 NOTE — PROGRESS NOTES
Dx: R LE pain         Insurance (Authorized # of Visits):  6           Authorizing Physician: Dr. Marisol Lawrence MD visit: none scheduled  Fall Risk: standard         Precautions: Cancer             Subjective: Increased pain 1 day after therapy.  Pain in the x 10  Hamstring stretch without DF R/L 3 x 10 sec hold Supine SKTC R/L 30 sec hold  Single leg pull distraction mobilizations L intermittent 5 mins  Hook lying bent leg lift with abd bracing R/L x 10  Hook lying bridging x 14  Hamstring stretch R/L 3 x 10 10  SLR x 10  Side lying clam R x 10  Hip abd R x 10   Prone lying alternating knee flexion R/L x 10  Manual quads stretch R/L 3 x 10 sec hold  STM across lower lumbar spine and post thigh/calf R LE 5 mins  Side lying hip abd R x 10  Clam R x 10 Prone lyin

## 2020-08-11 ENCOUNTER — APPOINTMENT (OUTPATIENT)
Dept: PHYSICAL THERAPY | Age: 70
End: 2020-08-11
Attending: INTERNAL MEDICINE
Payer: MEDICARE

## 2020-08-13 ENCOUNTER — OFFICE VISIT (OUTPATIENT)
Dept: PHYSICAL THERAPY | Age: 70
End: 2020-08-13
Attending: INTERNAL MEDICINE
Payer: MEDICARE

## 2020-08-13 PROCEDURE — 97110 THERAPEUTIC EXERCISES: CPT

## 2020-08-13 PROCEDURE — 97140 MANUAL THERAPY 1/> REGIONS: CPT

## 2020-08-13 NOTE — PROGRESS NOTES
Dx: R LE pain         Insurance (Authorized # of Visits):  8 + 4         Authorizing Physician: Dr. Giacomo Vaughan  Next MD visit: none scheduled  Fall Risk: standard         Precautions: Cancer             Subjective: Pain in the right leg 5-6/10, behind the thig hold  Single leg pull distraction mobilizations L intermittent 5 mins  Hook lying bent leg lift with abd bracing R/L x 10  Hook lying bridging x 14  Hamstring stretch R/L 3 x 10 sec hold Supine single leg pull distraction mobilization grade 3 5 mins  Manua sec hold x 2 Prone lying alternating knee flexion R/L x 10  Prone manual quads stretch R/L 30 sec hold  STM across lumbar spine and upper glute 5 min                             Supine leg pull distraction mobilizations R LE 5 x 30 sec hold  STM R lower le stretch 3 x 10 sec hold  Sit to stand using hands x 10         HEP: Bridging, LTR, abd bracing with bent leg lift, SKTC stretch, prone knee flexion, prone hip extension, hook lying hip abd with BTB, sit to stand.     Charges: MT 1, EX 2      Total Timed Luke

## 2020-08-20 ENCOUNTER — OFFICE VISIT (OUTPATIENT)
Dept: PHYSICAL THERAPY | Age: 70
End: 2020-08-20
Attending: INTERNAL MEDICINE
Payer: MEDICARE

## 2020-08-20 PROCEDURE — 97110 THERAPEUTIC EXERCISES: CPT

## 2020-08-20 PROCEDURE — 97140 MANUAL THERAPY 1/> REGIONS: CPT

## 2020-08-20 NOTE — PROGRESS NOTES
Dx: R LE pain         Insurance (Authorized # of Visits):  8 + 4         Authorizing Physician: Dr. Ramses Perea  Next MD visit: none scheduled  Fall Risk: standard         Precautions: Cancer             Subjective: Pain in the right leg 4-5/10, behind the thig sec hold Supine SKTC R/L 30 sec hold  Single leg pull distraction mobilizations L intermittent 5 mins  Hook lying bent leg lift with abd bracing R/L x 10  Hook lying bridging x 14  Hamstring stretch R/L 3 x 10 sec hold Supine single leg pull distraction mo hold    Side lying with pillow R clam x 10  R hip abd x 10 Side lying hip abd R x 10  Clam R x 10  Manual hip flexor stretch R 3 x 10 sec hold  STM R glute, lateral thigh and calf 5 mins  Prone lying STM R calf 5 mins  Alternating knee flexion R/L x 10  Al pumps R x 20  STM R calf 5 mins  Prone lying manual quads stretch R 3 x 10 sec hold  STM post thigh and calf 5 mins Side lying lumbar spine rotation mobilizations grade 2-3 2 mins   Prone lying R knee flexion x 10  Manual quads stretch R 3 x 10 sec hold  S

## 2020-08-25 ENCOUNTER — OFFICE VISIT (OUTPATIENT)
Dept: PHYSICAL THERAPY | Age: 70
End: 2020-08-25
Attending: INTERNAL MEDICINE
Payer: MEDICARE

## 2020-08-25 PROCEDURE — 97140 MANUAL THERAPY 1/> REGIONS: CPT

## 2020-08-25 PROCEDURE — 97110 THERAPEUTIC EXERCISES: CPT

## 2020-08-25 NOTE — PROGRESS NOTES
Dx: R LE pain         Insurance (Authorized # of Visits):  8 + 4         Authorizing Physician: Dr. Herbert Ontiveros  Next MD visit: none scheduled  Fall Risk: standard         Precautions: Cancer             Subjective: Pain is better than prior to coming for thera Supine SKTC R/L 30 sec hold  Single leg pull distraction mobilizations L intermittent 5 mins  Hook lying bent leg lift with abd bracing R/L x 10  Hook lying bridging x 14  Hamstring stretch R/L 3 x 10 sec hold Supine single leg pull distraction mobilizatio hold  Alternating knee flexion R/L x 10  PA mobilizations lumbar spine grade 3 30 secs L3-5  STM across lower back and R glute muscle 5 mins Prone lying STM R calf 5 mins  Prone alternating knee flexion R/L x 10  Alternating hip extension R/L x 10  Prone m 10  Side lying with pillow MHP R glute and calf 15 mins Hook lying add squeeze with ball x 20  Hip abd with blue t band x 15  Supine with pillow behind knees MHP R knee/calf 10 mins Hook lying add squeeze with ball x 20  Hip abd with BTB x 20  Supine with

## 2020-08-27 ENCOUNTER — OFFICE VISIT (OUTPATIENT)
Dept: PHYSICAL THERAPY | Age: 70
End: 2020-08-27
Attending: INTERNAL MEDICINE
Payer: MEDICARE

## 2020-08-27 PROCEDURE — 97110 THERAPEUTIC EXERCISES: CPT

## 2020-08-27 PROCEDURE — 97140 MANUAL THERAPY 1/> REGIONS: CPT

## 2020-08-27 NOTE — PROGRESS NOTES
Dx: R LE pain         Insurance (Authorized # of Visits):  8 + 4         Authorizing Physician: Dr. Noe Lawrence MD visit: none scheduled  Fall Risk: standard         Precautions: Cancer           Discharge Note:  Subjective: Pain is better than prior to c hold  Single leg pull distraction mobilizations L intermittent 5 mins  Hook lying bent leg lift with abd bracing R/L x 10  Hook lying bridging x 14  Hamstring stretch R/L 3 x 10 sec hold Supine single leg pull distraction mobilization grade 3 5 mins  Manua grade 3 5 mins  Passive hip/knee flexion x 10 Side lying STM R lateral hip, thigh and calf 5 mins  Manual quads stretch R 10 sec hold 2 min STM R calf, lower leg 5 mins  Prone lying manual quads stretch R/L 30 sec hold  Alternating knee flexion R/L x 10  P clam R x 10  Hip abd R x 10   Prone lying alternating knee flexion R/L x 10  Manual quads stretch R/L 3 x 10 sec hold  STM across lower lumbar spine and post thigh/calf R LE 5 mins  Side lying hip abd R x 10  Clam R x 10 Prone lying manual quads stretch R

## 2020-09-15 ENCOUNTER — TELEPHONE (OUTPATIENT)
Dept: INTERNAL MEDICINE CLINIC | Facility: CLINIC | Age: 70
End: 2020-09-15

## 2020-09-15 DIAGNOSIS — Z13.5 SCREENING FOR DIABETIC RETINOPATHY: ICD-10-CM

## 2020-09-15 DIAGNOSIS — E78.2 MIXED HYPERLIPIDEMIA: ICD-10-CM

## 2020-09-15 DIAGNOSIS — E66.09 CLASS 1 OBESITY DUE TO EXCESS CALORIES WITH SERIOUS COMORBIDITY AND BODY MASS INDEX (BMI) OF 32.0 TO 32.9 IN ADULT: ICD-10-CM

## 2020-09-15 DIAGNOSIS — E11.9 TYPE 2 DIABETES MELLITUS WITHOUT COMPLICATION, WITHOUT LONG-TERM CURRENT USE OF INSULIN (HCC): ICD-10-CM

## 2020-09-15 NOTE — TELEPHONE ENCOUNTER
Disp Refills Start End    atorvastatin 20 MG Oral Tab 90 tablet 0 6/16/2020     Sig - Route: Take 1 tablet (20 mg total) by mouth nightly.  [facility], [address] [place] [address]  [phone] [facility] [phone], [phone]    Pt i

## 2020-09-16 RX ORDER — ATORVASTATIN CALCIUM 20 MG/1
TABLET, FILM COATED ORAL
Qty: 90 TABLET | Refills: 1 | Status: ON HOLD | OUTPATIENT
Start: 2020-09-16 | End: 2020-12-08

## 2020-10-26 ENCOUNTER — HOSPITAL ENCOUNTER (OUTPATIENT)
Dept: MAMMOGRAPHY | Age: 70
Discharge: HOME | End: 2020-10-26
Attending: INTERNAL MEDICINE
Payer: MEDICARE

## 2020-10-26 DIAGNOSIS — Z12.31 VISIT FOR SCREENING MAMMOGRAM: ICD-10-CM

## 2020-10-26 PROCEDURE — 77067 SCR MAMMO BI INCL CAD: CPT | Performed by: INTERNAL MEDICINE

## 2020-10-26 PROCEDURE — 77063 BREAST TOMOSYNTHESIS BI: CPT | Performed by: INTERNAL MEDICINE

## 2020-10-28 ENCOUNTER — TELEPHONE (OUTPATIENT)
Dept: INTERNAL MEDICINE CLINIC | Facility: CLINIC | Age: 70
End: 2020-10-28

## 2020-10-28 ENCOUNTER — HOSPITAL ENCOUNTER (OUTPATIENT)
Dept: MAMMOGRAPHY | Facility: HOSPITAL | Age: 70
Discharge: HOME | End: 2020-10-28
Attending: INTERNAL MEDICINE
Payer: MEDICARE

## 2020-10-28 DIAGNOSIS — R92.2 INCONCLUSIVE MAMMOGRAM: ICD-10-CM

## 2020-10-28 DIAGNOSIS — R92.8 ABNORMAL MAMMOGRAM: Primary | ICD-10-CM

## 2020-10-28 PROCEDURE — 77061 BREAST TOMOSYNTHESIS UNI: CPT | Performed by: INTERNAL MEDICINE

## 2020-10-28 PROCEDURE — 76642 ULTRASOUND BREAST LIMITED: CPT | Performed by: INTERNAL MEDICINE

## 2020-10-28 PROCEDURE — 77065 DX MAMMO INCL CAD UNI: CPT | Performed by: INTERNAL MEDICINE

## 2020-10-28 NOTE — IMAGING NOTE
This Breast Care RN assisted Dr. Johanne Levy with recommendation for a L breast biopsy for spiculated mass along with prominent L axillary LN biopsy. Procedure reviewed with patient and her nephew Vish Vogel, a physician and all questions answered.  Emotional and

## 2020-10-29 NOTE — ADDENDUM NOTE
[facility]: 10/29/2020 01:50 PM     Modules accepted: Orders

## 2020-10-29 NOTE — TELEPHONE ENCOUNTER
Dalton Ramirez with [facility] mammography department states that they can do BX with Dr Grisel Syed on 11/4/20. Is this time frame acceptable?

## 2020-10-29 NOTE — TELEPHONE ENCOUNTER
Future Appointments   Date Time Provider Moisés Dorado   10/30/2020 10:00 AM [facility] CARISA BREAST BX [address] [facility]

## 2020-10-30 ENCOUNTER — HOSPITAL ENCOUNTER (OUTPATIENT)
Dept: MAMMOGRAPHY | Facility: HOSPITAL | Age: 70
Discharge: HOME | End: 2020-10-30
Attending: INTERNAL MEDICINE
Payer: MEDICARE

## 2020-10-30 DIAGNOSIS — R92.8 ABNORMAL MAMMOGRAM: ICD-10-CM

## 2020-10-30 PROCEDURE — 88342 IMHCHEM/IMCYTCHM 1ST ANTB: CPT | Performed by: INTERNAL MEDICINE

## 2020-10-30 PROCEDURE — 76942 ECHO GUIDE FOR BIOPSY: CPT | Performed by: INTERNAL MEDICINE

## 2020-10-30 PROCEDURE — 88341 IMHCHEM/IMCYTCHM EA ADD ANTB: CPT | Performed by: INTERNAL MEDICINE

## 2020-10-30 PROCEDURE — 88305 TISSUE EXAM BY PATHOLOGIST: CPT | Performed by: INTERNAL MEDICINE

## 2020-10-30 PROCEDURE — 88360 TUMOR IMMUNOHISTOCHEM/MANUAL: CPT | Performed by: INTERNAL MEDICINE

## 2020-10-30 PROCEDURE — 38505 NEEDLE BIOPSY LYMPH NODES: CPT | Performed by: INTERNAL MEDICINE

## 2020-10-30 PROCEDURE — 19083 BX BREAST 1ST LESION US IMAG: CPT | Performed by: INTERNAL MEDICINE

## 2020-10-30 PROCEDURE — 77065 DX MAMMO INCL CAD UNI: CPT | Performed by: INTERNAL MEDICINE

## 2020-11-03 ENCOUNTER — TELEPHONE (OUTPATIENT)
Dept: MAMMOGRAPHY | Facility: HOSPITAL | Age: 70
End: 2020-11-03

## 2020-11-03 ENCOUNTER — NURSE NAVIGATOR ENCOUNTER (OUTPATIENT)
Dept: HEMATOLOGY/ONCOLOGY | Facility: HOSPITAL | Age: 70
End: 2020-11-03

## 2020-11-03 ENCOUNTER — OFFICE VISIT (OUTPATIENT)
Dept: SURGERY | Facility: CLINIC | Age: 70
End: 2020-11-03
Payer: MEDICARE

## 2020-11-03 ENCOUNTER — TELEPHONE (OUTPATIENT)
Dept: INTERNAL MEDICINE CLINIC | Facility: CLINIC | Age: 70
End: 2020-11-03

## 2020-11-03 VITALS
BODY MASS INDEX: 31.15 KG/M2 | HEIGHT: 61 IN | HEART RATE: 53 BPM | OXYGEN SATURATION: 98 % | WEIGHT: 165 LBS | RESPIRATION RATE: 18 BRPM | SYSTOLIC BLOOD PRESSURE: 142 MMHG | DIASTOLIC BLOOD PRESSURE: 72 MMHG

## 2020-11-03 DIAGNOSIS — C50.912 INVASIVE DUCTAL CARCINOMA OF BREAST, FEMALE, LEFT (HCC): Primary | ICD-10-CM

## 2020-11-03 PROCEDURE — 3008F BODY MASS INDEX DOCD: CPT | Performed by: SURGERY

## 2020-11-03 PROCEDURE — 3078F DIAST BP <80 MM HG: CPT | Performed by: SURGERY

## 2020-11-03 PROCEDURE — 3077F SYST BP >= 140 MM HG: CPT | Performed by: SURGERY

## 2020-11-03 PROCEDURE — 99205 OFFICE O/P NEW HI 60 MIN: CPT | Performed by: SURGERY

## 2020-11-03 NOTE — TELEPHONE ENCOUNTER
Spoke with Heavenly Mccabe from Dr. Errol Farrar office and explained breast bx results of IDC. Surgical consult of Dr. Marcell Louis or Dr. Cramer.

## 2020-11-03 NOTE — IMAGING NOTE
Pt reports no unexpected issues at biopsy site. Pt provided with results of pathology report of diagnosis IDC. Surgical consult Dr. Vicki Meyer recommended by PCP office and pt already has appt later today.  Questions asked and answered and emotional support pro

## 2020-11-03 NOTE — TELEPHONE ENCOUNTER
Mammography dept called to inform abnormal biopsy result \"malignant\", informed Antonio Nunez we usually refer patients to see Dr Gama Samayoa or Dr Macie Nageotte.     Dr Brendan Henriquez verbally informed and stated pt does have an upcoming ov w Dr Griffin Medrano for today as was suspecting ma

## 2020-11-03 NOTE — PATIENT INSTRUCTIONS
Dr. Vi Antonio MD    [facility]  Tel:  [phone]      [address] [place]   Fax: [phone]    _____________________________________________________________________      Surgery/Procedure:  Left simpl not contacted by 4pm, please call the surgeon's office listed above. 10. Do not take any blood thinners at least one week prior to the procedure/surgery.  This includes aspirin, baby aspirin, Motrin, Ibuprofen, herbal supplements, diet medications, vitamin

## 2020-11-03 NOTE — PROGRESS NOTES
Met with patient in clinic. Introduced myself as the breast navigator nurse and explained my role and how I will work with all of the physicians involved in her care.  Explained the role of all of the physicians involved in her care including the surgeonberta

## 2020-11-04 ENCOUNTER — TELEPHONE (OUTPATIENT)
Dept: SURGERY | Facility: CLINIC | Age: 70
End: 2020-11-04

## 2020-11-04 NOTE — TELEPHONE ENCOUNTER
I called the patient to schedule her procedure and she stated she had explained to Dr Brittany Cesar that her  is ill and has future procedures scheduled and she must take care of him first. She stated they have no one else to assist with caregiving.  I trie

## 2020-11-05 NOTE — PROGRESS NOTES
Breast Surgery New Patient Consultation    This is the first visit for this 72year old woman, referred by Dr. Jose Ernst, who presents for evaluation of breast cancer.     History of Present Illness:   Ms. Pedro Rubio is a 72year old woman who presen contraceptive use. She denies infertility treatment to achieve pregnancy.     Medications:      •  ATORVASTATIN 20 MG Oral Tab, Take 1 tablet by mouth nightly, Disp: 90 tablet, Rfl: 1    •  Irbesartan 75 MG Oral Tab, Take 1 tablet by mouth once daily, Disp breathing with exertion, emphysema, chronic bronchitis, shortness of breath or abnormal sound when breathing. Cardiovascular:   There is no history of chest pain, chest pressure/discomfort, palpitations, irregular heartbeat, fainting or near-fainting, d Location: Right arm, Patient Position: Sitting, Cuff Size: large)   Pulse 53   Resp 18   Ht 1.549 m (5' 1\")   Wt 74.8 kg (165 lb)   SpO2 98%   BMI 31.18 kg/m²     Physical Exam:  The patient is an alert, oriented, well-nourished and  well-developed woman deformity, cyanosis or edema. Impression:   Ms. Joycelyn Horton is a 72year old woman presents with personal history of left breast cancer with new ipsilateral breast primary, clinical stage T1 NX MX.     Discussion and Plan:  I had a discussion prior lymph node interrogation and we discussed that she may not map successfully with lymphoscintigraphy and we will plan for possible lymph adenectomy if needed.   We discussed the opportunity for her to meet with plastic surgery to discuss reconstruction

## 2020-11-13 NOTE — TELEPHONE ENCOUNTER
Per Dr Alethea Ellis she had advised the patient we would schedule beginning of December.  I called the patient's cell# and left a voice mail (ok per verbal) advising we are holding a date and requested a return call to schedule

## 2020-11-16 DIAGNOSIS — C50.912 INVASIVE DUCTAL CARCINOMA OF BREAST, FEMALE, LEFT (HCC): Primary | ICD-10-CM

## 2020-11-16 NOTE — TELEPHONE ENCOUNTER
Patient returned my call and we scheduled her procedure with Dr Paddy Serrato on 12/08/2020 at [facility]. Confirmed date and location.

## 2020-11-19 ENCOUNTER — NURSE NAVIGATOR ENCOUNTER (OUTPATIENT)
Dept: HEMATOLOGY/ONCOLOGY | Facility: HOSPITAL | Age: 70
End: 2020-11-19

## 2020-11-19 NOTE — PROGRESS NOTES
Spoke with patient regarding upcoming plans for surgery. Pt has mastectomy scheduled for 12/8 with Dr. Griffin Medrano. Assisted in scheduling follow up appointment with Dr. Demetra Brown following her first post operative visit on 12/15.

## 2020-11-30 ENCOUNTER — LAB ENCOUNTER (OUTPATIENT)
Dept: LAB | Age: 70
End: 2020-11-30
Attending: SURGERY
Payer: MEDICARE

## 2020-11-30 ENCOUNTER — TELEPHONE (OUTPATIENT)
Dept: SURGERY | Facility: CLINIC | Age: 70
End: 2020-11-30

## 2020-11-30 ENCOUNTER — APPOINTMENT (OUTPATIENT)
Dept: LAB | Age: 70
End: 2020-11-30
Attending: SURGERY
Payer: MEDICARE

## 2020-11-30 ENCOUNTER — NURSE NAVIGATOR ENCOUNTER (OUTPATIENT)
Dept: HEMATOLOGY/ONCOLOGY | Facility: HOSPITAL | Age: 70
End: 2020-11-30

## 2020-11-30 DIAGNOSIS — C50.912 INVASIVE DUCTAL CARCINOMA OF BREAST, FEMALE, LEFT (HCC): ICD-10-CM

## 2020-11-30 PROCEDURE — 93010 ELECTROCARDIOGRAM REPORT: CPT | Performed by: INTERNAL MEDICINE

## 2020-11-30 PROCEDURE — 36415 COLL VENOUS BLD VENIPUNCTURE: CPT

## 2020-11-30 PROCEDURE — 80048 BASIC METABOLIC PNL TOTAL CA: CPT

## 2020-11-30 PROCEDURE — 93005 ELECTROCARDIOGRAM TRACING: CPT

## 2020-11-30 NOTE — TELEPHONE ENCOUNTER
I called the patient and left a voice mail confirming her procedure date with DR Billie Thompson on 12-8-2020 at [facility]

## 2020-11-30 NOTE — PROGRESS NOTES
This breast RN navigator received a message from [place], [address] [place] [address] , that pt had concerns with taking care of herself at home and was inquiring about home discharge assistance.   This breast RN navigator reached out to social workers in [facility]

## 2020-12-02 ENCOUNTER — SOCIAL WORK SERVICES (OUTPATIENT)
Dept: HEMATOLOGY/ONCOLOGY | Facility: HOSPITAL | Age: 70
End: 2020-12-02

## 2020-12-02 ENCOUNTER — TELEPHONE (OUTPATIENT)
Dept: GENERAL RADIOLOGY | Facility: HOSPITAL | Age: 70
End: 2020-12-02

## 2020-12-02 DIAGNOSIS — C50.912 INVASIVE DUCTAL CARCINOMA OF BREAST, FEMALE, LEFT (HCC): Primary | ICD-10-CM

## 2020-12-02 NOTE — PROGRESS NOTES
Tara spoke with Keysha RADER, plastic surgery, who reports that pt.  Is having procedure 12/8/2020 and will be a 23 hour observation in the hospital. Alfonzo Meng reports that home health order is being entered, but further reported that hospital caseworkers normally s

## 2020-12-03 ENCOUNTER — SOCIAL WORK SERVICES (OUTPATIENT)
Dept: HEMATOLOGY/ONCOLOGY | Facility: HOSPITAL | Age: 70
End: 2020-12-03

## 2020-12-03 NOTE — PROGRESS NOTES
SW called and spoke with patient about upcoming surgery and needs for assistance in the home. Sw discussed home health and confirmed with patient that referral was faxed to [facility]- [phone].  Patient did not believe she would need private d

## 2020-12-04 DIAGNOSIS — C50.912 INVASIVE DUCTAL CARCINOMA OF BREAST, FEMALE, LEFT (HCC): Primary | ICD-10-CM

## 2020-12-05 ENCOUNTER — APPOINTMENT (OUTPATIENT)
Dept: LAB | Facility: HOSPITAL | Age: 70
End: 2020-12-05
Attending: SURGERY
Payer: MEDICARE

## 2020-12-05 DIAGNOSIS — C50.912 INVASIVE DUCTAL CARCINOMA OF BREAST, FEMALE, LEFT (HCC): ICD-10-CM

## 2020-12-07 ENCOUNTER — TELEPHONE (OUTPATIENT)
Dept: MAMMOGRAPHY | Facility: HOSPITAL | Age: 70
End: 2020-12-07

## 2020-12-07 NOTE — TELEPHONE ENCOUNTER
Spoke with Taniya Pierce regarding [place] Lymph Node mapping which will be done in nuclear medicine department before breast surgery scheduled for 12-8-20 . Procedure explained and all questions answered. Verbal education about lymphedema given.

## 2020-12-08 ENCOUNTER — ANESTHESIA (OUTPATIENT)
Dept: SURGERY | Facility: HOSPITAL | Age: 70
End: 2020-12-08
Payer: MEDICARE

## 2020-12-08 ENCOUNTER — ANESTHESIA EVENT (OUTPATIENT)
Dept: SURGERY | Facility: HOSPITAL | Age: 70
End: 2020-12-08
Payer: MEDICARE

## 2020-12-08 ENCOUNTER — HOSPITAL ENCOUNTER (OUTPATIENT)
Facility: HOSPITAL | Age: 70
Discharge: HOME | End: 2020-12-09
Attending: SURGERY | Admitting: SURGERY
Payer: MEDICARE

## 2020-12-08 ENCOUNTER — HOSPITAL ENCOUNTER (OUTPATIENT)
Dept: NUCLEAR MEDICINE | Facility: HOSPITAL | Age: 70
Discharge: HOME | End: 2020-12-08
Attending: SURGERY
Payer: MEDICARE

## 2020-12-08 DIAGNOSIS — C50.912 INVASIVE DUCTAL CARCINOMA OF BREAST, FEMALE, LEFT (HCC): ICD-10-CM

## 2020-12-08 DIAGNOSIS — C50.912 INVASIVE DUCTAL CARCINOMA OF BREAST, FEMALE, LEFT (HCC): Primary | ICD-10-CM

## 2020-12-08 PROCEDURE — 78195 LYMPH SYSTEM IMAGING: CPT | Performed by: SURGERY

## 2020-12-08 PROCEDURE — 88307 TISSUE EXAM BY PATHOLOGIST: CPT | Performed by: SURGERY

## 2020-12-08 PROCEDURE — 76942 ECHO GUIDE FOR BIOPSY: CPT | Performed by: ANESTHESIOLOGY

## 2020-12-08 PROCEDURE — 82962 GLUCOSE BLOOD TEST: CPT

## 2020-12-08 PROCEDURE — 4A1635H MONITORING OF LYMPHATIC FLOW USING INDOCYANINE GREEN DYE, PERCUTANEOUS APPROACH: ICD-10-PCS | Performed by: SURGERY

## 2020-12-08 PROCEDURE — 0HBU0ZZ EXCISION OF LEFT BREAST, OPEN APPROACH: ICD-10-PCS | Performed by: SURGERY

## 2020-12-08 RX ORDER — ATORVASTATIN CALCIUM 20 MG/1
20 TABLET, FILM COATED ORAL NIGHTLY
Status: DISCONTINUED | OUTPATIENT
Start: 2020-12-08 | End: 2020-12-09

## 2020-12-08 RX ORDER — LOSARTAN POTASSIUM 25 MG/1
25 TABLET ORAL DAILY
Status: DISCONTINUED | OUTPATIENT
Start: 2020-12-08 | End: 2020-12-09

## 2020-12-08 RX ORDER — BISACODYL 10 MG
10 SUPPOSITORY, RECTAL RECTAL
Status: DISCONTINUED | OUTPATIENT
Start: 2020-12-08 | End: 2020-12-09

## 2020-12-08 RX ORDER — HYDROMORPHONE HYDROCHLORIDE 1 MG/ML
0.4 INJECTION, SOLUTION INTRAMUSCULAR; INTRAVENOUS; SUBCUTANEOUS EVERY 5 MIN PRN
Status: DISCONTINUED | OUTPATIENT
Start: 2020-12-08 | End: 2020-12-08 | Stop reason: HOSPADM

## 2020-12-08 RX ORDER — DOCUSATE SODIUM 100 MG/1
100 CAPSULE, LIQUID FILLED ORAL 2 TIMES DAILY
Status: DISCONTINUED | OUTPATIENT
Start: 2020-12-08 | End: 2020-12-09

## 2020-12-08 RX ORDER — HYDROCODONE BITARTRATE AND ACETAMINOPHEN 5; 325 MG/1; MG/1
2 TABLET ORAL AS NEEDED
Status: DISCONTINUED | OUTPATIENT
Start: 2020-12-08 | End: 2020-12-08 | Stop reason: HOSPADM

## 2020-12-08 RX ORDER — ATORVASTATIN CALCIUM 20 MG/1
20 TABLET, FILM COATED ORAL NIGHTLY
COMMUNITY
End: 2021-02-16

## 2020-12-08 RX ORDER — ONDANSETRON 2 MG/ML
4 INJECTION INTRAMUSCULAR; INTRAVENOUS EVERY 6 HOURS PRN
Status: DISCONTINUED | OUTPATIENT
Start: 2020-12-08 | End: 2020-12-09

## 2020-12-08 RX ORDER — MIDAZOLAM HYDROCHLORIDE 1 MG/ML
1 INJECTION INTRAMUSCULAR; INTRAVENOUS EVERY 5 MIN PRN
Status: DISCONTINUED | OUTPATIENT
Start: 2020-12-08 | End: 2020-12-08 | Stop reason: HOSPADM

## 2020-12-08 RX ORDER — MORPHINE SULFATE 4 MG/ML
1 INJECTION, SOLUTION INTRAMUSCULAR; INTRAVENOUS EVERY 2 HOUR PRN
Status: DISCONTINUED | OUTPATIENT
Start: 2020-12-08 | End: 2020-12-09

## 2020-12-08 RX ORDER — BUPIVACAINE HYDROCHLORIDE 2.5 MG/ML
INJECTION, SOLUTION EPIDURAL; INFILTRATION; INTRACAUDAL AS NEEDED
Status: DISCONTINUED | OUTPATIENT
Start: 2020-12-08 | End: 2020-12-08 | Stop reason: SURG

## 2020-12-08 RX ORDER — ACETAMINOPHEN 325 MG/1
650 TABLET ORAL EVERY 4 HOURS PRN
Status: DISCONTINUED | OUTPATIENT
Start: 2020-12-08 | End: 2020-12-09

## 2020-12-08 RX ORDER — DIPHENHYDRAMINE HCL 25 MG
25 CAPSULE ORAL NIGHTLY PRN
Status: DISCONTINUED | OUTPATIENT
Start: 2020-12-08 | End: 2020-12-09

## 2020-12-08 RX ORDER — METOCLOPRAMIDE HYDROCHLORIDE 5 MG/ML
10 INJECTION INTRAMUSCULAR; INTRAVENOUS AS NEEDED
Status: DISCONTINUED | OUTPATIENT
Start: 2020-12-08 | End: 2020-12-08 | Stop reason: HOSPADM

## 2020-12-08 RX ORDER — DOXEPIN HYDROCHLORIDE 50 MG/1
1 CAPSULE ORAL DAILY
Status: DISCONTINUED | OUTPATIENT
Start: 2020-12-09 | End: 2020-12-09

## 2020-12-08 RX ORDER — SODIUM CHLORIDE, SODIUM LACTATE, POTASSIUM CHLORIDE, CALCIUM CHLORIDE 600; 310; 30; 20 MG/100ML; MG/100ML; MG/100ML; MG/100ML
INJECTION, SOLUTION INTRAVENOUS CONTINUOUS
Status: DISCONTINUED | OUTPATIENT
Start: 2020-12-08 | End: 2020-12-08

## 2020-12-08 RX ORDER — LIDOCAINE HYDROCHLORIDE 40 MG/ML
INJECTION, SOLUTION RETROBULBAR; TOPICAL AS NEEDED
Status: DISCONTINUED | OUTPATIENT
Start: 2020-12-08 | End: 2020-12-08 | Stop reason: SURG

## 2020-12-08 RX ORDER — POLYETHYLENE GLYCOL 3350 17 G/17G
17 POWDER, FOR SOLUTION ORAL DAILY PRN
Status: DISCONTINUED | OUTPATIENT
Start: 2020-12-08 | End: 2020-12-09

## 2020-12-08 RX ORDER — SODIUM CHLORIDE, SODIUM LACTATE, POTASSIUM CHLORIDE, CALCIUM CHLORIDE 600; 310; 30; 20 MG/100ML; MG/100ML; MG/100ML; MG/100ML
INJECTION, SOLUTION INTRAVENOUS CONTINUOUS
Status: DISCONTINUED | OUTPATIENT
Start: 2020-12-08 | End: 2020-12-08 | Stop reason: HOSPADM

## 2020-12-08 RX ORDER — HYDROCODONE BITARTRATE AND ACETAMINOPHEN 5; 325 MG/1; MG/1
1 TABLET ORAL EVERY 4 HOURS PRN
Status: DISCONTINUED | OUTPATIENT
Start: 2020-12-08 | End: 2020-12-09

## 2020-12-08 RX ORDER — DEXAMETHASONE SODIUM PHOSPHATE 10 MG/ML
INJECTION, SOLUTION INTRAMUSCULAR; INTRAVENOUS AS NEEDED
Status: DISCONTINUED | OUTPATIENT
Start: 2020-12-08 | End: 2020-12-08 | Stop reason: SURG

## 2020-12-08 RX ORDER — MORPHINE SULFATE 4 MG/ML
4 INJECTION, SOLUTION INTRAMUSCULAR; INTRAVENOUS EVERY 2 HOUR PRN
Status: DISCONTINUED | OUTPATIENT
Start: 2020-12-08 | End: 2020-12-09

## 2020-12-08 RX ORDER — ACETAMINOPHEN 500 MG
1000 TABLET ORAL ONCE
Status: DISCONTINUED | OUTPATIENT
Start: 2020-12-08 | End: 2020-12-08 | Stop reason: ALTCHOICE

## 2020-12-08 RX ORDER — MEPERIDINE HYDROCHLORIDE 25 MG/ML
12.5 INJECTION INTRAMUSCULAR; INTRAVENOUS; SUBCUTANEOUS AS NEEDED
Status: DISCONTINUED | OUTPATIENT
Start: 2020-12-08 | End: 2020-12-08 | Stop reason: HOSPADM

## 2020-12-08 RX ORDER — DIAZEPAM 5 MG/1
5 TABLET ORAL AS NEEDED
Status: DISCONTINUED | OUTPATIENT
Start: 2020-12-08 | End: 2020-12-08 | Stop reason: HOSPADM

## 2020-12-08 RX ORDER — CALCIUM CARBONATE/VITAMIN D3 250-3.125
1 TABLET ORAL DAILY
Status: DISCONTINUED | OUTPATIENT
Start: 2020-12-08 | End: 2020-12-09

## 2020-12-08 RX ORDER — HYDROCODONE BITARTRATE AND ACETAMINOPHEN 5; 325 MG/1; MG/1
1 TABLET ORAL AS NEEDED
Status: DISCONTINUED | OUTPATIENT
Start: 2020-12-08 | End: 2020-12-08 | Stop reason: HOSPADM

## 2020-12-08 RX ORDER — DEXTROSE MONOHYDRATE 25 G/50ML
50 INJECTION, SOLUTION INTRAVENOUS
Status: DISCONTINUED | OUTPATIENT
Start: 2020-12-08 | End: 2020-12-08 | Stop reason: HOSPADM

## 2020-12-08 RX ORDER — DEXTROSE, SODIUM CHLORIDE, AND POTASSIUM CHLORIDE 5; .45; .15 G/100ML; G/100ML; G/100ML
INJECTION INTRAVENOUS CONTINUOUS
Status: DISCONTINUED | OUTPATIENT
Start: 2020-12-08 | End: 2020-12-09

## 2020-12-08 RX ORDER — NALOXONE HYDROCHLORIDE 0.4 MG/ML
80 INJECTION, SOLUTION INTRAMUSCULAR; INTRAVENOUS; SUBCUTANEOUS AS NEEDED
Status: DISCONTINUED | OUTPATIENT
Start: 2020-12-08 | End: 2020-12-08 | Stop reason: HOSPADM

## 2020-12-08 RX ORDER — MORPHINE SULFATE 4 MG/ML
2 INJECTION, SOLUTION INTRAMUSCULAR; INTRAVENOUS EVERY 2 HOUR PRN
Status: DISCONTINUED | OUTPATIENT
Start: 2020-12-08 | End: 2020-12-09

## 2020-12-08 RX ORDER — CEFAZOLIN SODIUM/WATER 2 G/20 ML
2 SYRINGE (ML) INTRAVENOUS ONCE
Status: COMPLETED | OUTPATIENT
Start: 2020-12-08 | End: 2020-12-08

## 2020-12-08 RX ORDER — DEXTROSE, SODIUM CHLORIDE, AND POTASSIUM CHLORIDE 5; .45; .15 G/100ML; G/100ML; G/100ML
INJECTION INTRAVENOUS
Status: COMPLETED
Start: 2020-12-08 | End: 2020-12-08

## 2020-12-08 RX ORDER — HYDROCODONE BITARTRATE AND ACETAMINOPHEN 5; 325 MG/1; MG/1
2 TABLET ORAL EVERY 4 HOURS PRN
Status: DISCONTINUED | OUTPATIENT
Start: 2020-12-08 | End: 2020-12-09

## 2020-12-08 RX ORDER — ONDANSETRON 2 MG/ML
4 INJECTION INTRAMUSCULAR; INTRAVENOUS AS NEEDED
Status: DISCONTINUED | OUTPATIENT
Start: 2020-12-08 | End: 2020-12-08 | Stop reason: HOSPADM

## 2020-12-08 RX ORDER — DIPHENHYDRAMINE HYDROCHLORIDE 50 MG/ML
12.5 INJECTION INTRAMUSCULAR; INTRAVENOUS AS NEEDED
Status: DISCONTINUED | OUTPATIENT
Start: 2020-12-08 | End: 2020-12-08 | Stop reason: HOSPADM

## 2020-12-08 RX ORDER — LIDOCAINE AND PRILOCAINE 25; 25 MG/G; MG/G
CREAM TOPICAL ONCE
Status: COMPLETED | OUTPATIENT
Start: 2020-12-08 | End: 2020-12-08

## 2020-12-08 RX ORDER — MIDAZOLAM HYDROCHLORIDE 1 MG/ML
INJECTION INTRAMUSCULAR; INTRAVENOUS AS NEEDED
Status: DISCONTINUED | OUTPATIENT
Start: 2020-12-08 | End: 2020-12-08 | Stop reason: SURG

## 2020-12-08 RX ORDER — BUPRENORPHINE HYDROCHLORIDE 0.32 MG/ML
INJECTION INTRAMUSCULAR; INTRAVENOUS AS NEEDED
Status: DISCONTINUED | OUTPATIENT
Start: 2020-12-08 | End: 2020-12-08

## 2020-12-08 RX ADMIN — MIDAZOLAM HYDROCHLORIDE 1 MG: 1 INJECTION INTRAMUSCULAR; INTRAVENOUS at 15:39:00

## 2020-12-08 RX ADMIN — DEXAMETHASONE SODIUM PHOSPHATE 2 MG: 10 INJECTION, SOLUTION INTRAMUSCULAR; INTRAVENOUS at 15:54:00

## 2020-12-08 RX ADMIN — SODIUM CHLORIDE, SODIUM LACTATE, POTASSIUM CHLORIDE, CALCIUM CHLORIDE: 600; 310; 30; 20 INJECTION, SOLUTION INTRAVENOUS at 17:05:00

## 2020-12-08 RX ADMIN — CEFAZOLIN SODIUM/WATER 2 G: 2 G/20 ML SYRINGE (ML) INTRAVENOUS at 15:59:00

## 2020-12-08 RX ADMIN — LIDOCAINE HYDROCHLORIDE 3 ML: 40 INJECTION, SOLUTION RETROBULBAR; TOPICAL at 15:47:00

## 2020-12-08 RX ADMIN — DEXAMETHASONE SODIUM PHOSPHATE 8 MG: 10 INJECTION, SOLUTION INTRAMUSCULAR; INTRAVENOUS at 16:05:00

## 2020-12-08 RX ADMIN — BUPIVACAINE HYDROCHLORIDE 30 ML: 2.5 INJECTION, SOLUTION EPIDURAL; INFILTRATION; INTRACAUDAL at 15:54:00

## 2020-12-08 RX ADMIN — BUPRENORPHINE HYDROCHLORIDE 150 MCG: 0.32 INJECTION INTRAMUSCULAR; INTRAVENOUS at 15:54:00

## 2020-12-08 NOTE — ANESTHESIA POSTPROCEDURE EVALUATION
[address] [place] [address] Patient Status:  Outpatient in a Bed   Age/Gender 72year old female MRN [id number]   [facility] SURGERY Attending Carleen Silveira, [employer] Se Day # 0 PCP Jenni White MD       Anesthesia Post-op Note

## 2020-12-08 NOTE — ANESTHESIA PROCEDURE NOTES
Regional Block  Performed by: Jarred Rose MD  Authorized by: Jarred Rose MD       General Information and Staff    Start Time:   Anesthesiologist: Jarred Rose MD  Performed by:   Anesthesiologist  Patient Location:  OR    Block Placement: Post Induction

## 2020-12-08 NOTE — ANESTHESIA PREPROCEDURE EVALUATION
PRE-OP EVALUATION    Patient Name: Pedro Rubio    Pre-op Diagnosis: Invasive ductal carcinoma of breast, female, left [facility] [C50.912]    Procedure(s):  Left simple mastectomy, left breast lymphoscintigraphy, left sentinel lymph node biopsy, poss daily., Disp: , Rfl: , 12/7/2020 at Unknown time    •  Glucose Blood (TRUE METRIX BLOOD GLUCOSE TEST) In Vitro Strip, USE TO TEST BLOOD SUGAR TWO TIMES A DAY, Disp: 100 strip, Rfl: 0    •  MARIELLE MICROLET LANCETS Does not apply Misc, Test blood sugar twice clear to auscultation bilaterally. Other findings            ASA: 2   Plan: general and regional  NPO status verified and patient meets guidelines. Post-procedure pain management plan discussed with surgeon and patient.   Surgeon requests:

## 2020-12-08 NOTE — H&P
History of Present Illness:   Ms. Keven Giles is a 72year old woman who presents with a personal history of left breast cancer treated with lumpectomy and radiation in 1990.   She now presents with an imaging detected ipsilateral new primary lef tablet by mouth nightly, Disp: 90 tablet, Rfl: 1     •  Irbesartan 75 MG Oral Tab, Take 1 tablet by mouth once daily, Disp: 90 tablet, Rfl: 2     •  metFORMIN HCl 500 MG Oral Tab, Take 1 tablet by mouth once daily with breakfast, Disp: 90 tablet, Rfl: 2    breathing.      Cardiovascular:   There is no history of chest pain, chest pressure/discomfort, palpitations, irregular heartbeat, fainting or near-fainting, difficulty breathing when lying flat, SOB/Coughing at night, swelling of the legs or chest pain whi 18   Ht 1.549 m (5' 1\")   Wt 74.8 kg (165 lb)   SpO2 98%   BMI 31.18 kg/m²      Physical Exam:  The patient is an alert, oriented, well-nourished and  well-developed woman who appears her stated age.  Her speech patterns and movements are normal. Her affec 72year old woman presents with personal history of left breast cancer with new ipsilateral breast primary, clinical stage T1 NX MX.     Discussion and Plan:  I had a discussion with the Patient regarding her breast exam. On exam today I found her to be he successfully with lymphoscintigraphy and we will plan for possible lymph adenectomy if needed. We discussed the opportunity for her to meet with plastic surgery to discuss reconstruction she reports that she is not interested in reconstruction.  The risks

## 2020-12-08 NOTE — ANESTHESIA PROCEDURE NOTES
Airway  Urgency: elective      General Information and Staff    Patient location during procedure: OR  Anesthesiologist: Erin Balderas MD  Performed: anesthesiologist     Indications and Patient Condition  Indications for airway management: anesthesia  Spon

## 2020-12-09 ENCOUNTER — SOCIAL WORK SERVICES (OUTPATIENT)
Dept: HEMATOLOGY/ONCOLOGY | Facility: HOSPITAL | Age: 70
End: 2020-12-09

## 2020-12-09 ENCOUNTER — NURSE NAVIGATOR ENCOUNTER (OUTPATIENT)
Dept: HEMATOLOGY/ONCOLOGY | Facility: HOSPITAL | Age: 70
End: 2020-12-09

## 2020-12-09 VITALS
TEMPERATURE: 98 F | BODY MASS INDEX: 31.8 KG/M2 | WEIGHT: 168.44 LBS | HEIGHT: 61 IN | RESPIRATION RATE: 18 BRPM | OXYGEN SATURATION: 95 % | DIASTOLIC BLOOD PRESSURE: 52 MMHG | HEART RATE: 62 BPM | SYSTOLIC BLOOD PRESSURE: 135 MMHG

## 2020-12-09 RX ORDER — HYDROCODONE BITARTRATE AND ACETAMINOPHEN 5; 325 MG/1; MG/1
1 TABLET ORAL EVERY 6 HOURS PRN
Qty: 20 TABLET | Refills: 0 | Status: SHIPPED | OUTPATIENT
Start: 2020-12-09 | End: 2020-12-15 | Stop reason: ALTCHOICE

## 2020-12-09 RX ORDER — DOCUSATE SODIUM 100 MG/1
100 CAPSULE, LIQUID FILLED ORAL 2 TIMES DAILY PRN
Qty: 20 CAPSULE | Refills: 1 | Status: SHIPPED | OUTPATIENT
Start: 2020-12-09 | End: 2021-02-11

## 2020-12-09 RX ORDER — ONDANSETRON 4 MG/1
4 TABLET, ORALLY DISINTEGRATING ORAL EVERY 8 HOURS PRN
Qty: 10 TABLET | Refills: 1 | Status: SHIPPED | OUTPATIENT
Start: 2020-12-09 | End: 2020-12-15 | Stop reason: ALTCHOICE

## 2020-12-09 NOTE — PLAN OF CARE
Assumed care at 0730. Pt a/ox4, VSS, on RA. Pt with pain 3/10 to incision site. Dressings to left breast are c/d/I. RICCO drain in place with serosanguinous output. Education for home care given. No c/o n/v/d, SOB, dizziness/lightheadedness.  Yaquelin Homes affect risk of falls.   - [place] fall precautions as indicated by assessment.  - Educate pt/family on patient safety including physical limitations  - Instruct pt to call for assistance with activity based on assessment  - Modify environment to reduce ri

## 2020-12-09 NOTE — PROGRESS NOTES
NURSING DISCHARGE NOTE    Discharged Home via Wheelchair. Accompanied by Support staff  Belongings Taken by patient/family. Pt discharged home. Discharge paperwork and home supplies given to pt.  Prescriptions electronically sent to pt pharmacy by Gaby Lopez

## 2020-12-09 NOTE — CM/SW NOTE
SW completed a chart review to identify needs and provide resources. SW also spoke with RN. [place], [place] with her . Pt states that she would like some resources to assist with home needs (laundry, cooking).  SW provided

## 2020-12-09 NOTE — PROGRESS NOTES
Met with patient following mastectomy without reconstruction. Pt is doing well, she is tolerating general diet. We discussed post operative exercises and activity restrictions. Pain management and risk for constipation reviewed.  Drain care reviewed with terri

## 2020-12-09 NOTE — PROGRESS NOTES
Sw received call from armaan Yap, confirming [facility] visit will be scheduled for tomorrow.

## 2020-12-09 NOTE — OPERATIVE REPORT
[facility] (formerly [facility])[3]    PATIENT'S NAME: Angelo NUÑEZ [facility]: Joann Benito. Sofia Shone, M.D. OPERATING PHYSICIAN: Joann Benito. Sofia Shone, M.D.    PATIENT ACCOUNT#:   [de-identified]    LOCATION:  [address] [place] [address]  MEDICAL RECORD #:   [id number] surgery. OPERATIVE TECHNIQUE:  The patient was brought to the Nuclear Medicine Department where she underwent injection of radioisotope as well as lymphoscintigraphy for mapping preoperatively.   She was noted to have migration of her tracer to the inter the skin with a nylon suture. Closure of the wound was accomplished using interrupted 3-0 Vicryl for deep layer and a running 4-0 subcuticular Monocryl for skin. Mastisol and Steri-Strips were applied. A sterile dressing was applied.   Biopatch and Tegad

## 2020-12-09 NOTE — PLAN OF CARE
Pt is alert and oriented x4. Up with standby assist. Voiding without difficulty. Lungs are clear on room air. Bowel sounds are active. Pt is tolerating general diet. Denies nausea. Denies passing gas.  Left breast incision with steri strips, 4x4, ace wrap a Provide assistive devices as appropriate  - Consider OT/PT consult to assist with strengthening/mobility  - Encourage toileting schedule  Outcome: Progressing     Problem: DISCHARGE PLANNING  Goal: Discharge to home or other facility with appropriate resou

## 2020-12-11 NOTE — PROGRESS NOTES
Breast Surgery Post-Operative Visit    Diagnosis: Left breast cancer status post left simple mastectomy on 12/8/20    Stage: T2NxMx    Disease Status:  Surgical treatment complete, medical oncology recommendations pending.     History of Present Illness: Date   • CHEMOTHERAPY Left 1990    6 months   • LUMPECTOMY LEFT Left 1990    breast cancer with chemo/radiation   • NEEDLE BIOPSY LEFT Left 1990   • RADIATION LEFT  1990    1 1/2 months       Gynecological History:  Pt is nulliparous.   She achieved menarch the legs or chest pain while walking.     Breasts:  See history of present illness    Gastrointestinal:     There is no history of difficulty or pain with swallowing, reflux symptoms, vomiting, dark or bloody stools, constipation, yellowing of the skin, ind normocephalic. The neck is supple. The thyroid is not enlarged and is without palpable masses/nodules. There are no palpable masses. The trachea is in the midline. Conjunctiva are clear, non-icteric. Chest: The chest expands symmetrically.  The lungs are personally reviewed her pathology. 2.2 cm tumor with negative margins for which no further surgery will be needed. She is meeting with medical oncology to discuss systemic implications. Surgical drain was removed without complication today.   She will fo

## 2020-12-14 NOTE — CONSULTS
Cancer Center Report of Consultation    Patient Name: Anjali Fowler   YOB: 1950   Medical Record Number: [id number]   CSN: [id number]   Consulting Physician: Clare Vences MD  Referring Physician(s): Estee Flores MD  Date of Con TAB0  Ectopic0  Multiple0  Live Births0    Nulliparous. Menarche @ 14. Psychosocial History:    , lives with . No children. Has good family support.     Allergies:   No Known Allergies    Current Medications:    Current [facility] dizziness, seizures, speech problems, gait problems   Psych: No anxiety/depression.     Vital Signs:  Height: --  Weight: --  BSA (Calculated - sq m): --  Pulse: 60 (12/15 0822)  BP: 128/68 (12/15 0822)  Temp: --  Do Not Use - Resp Rate: --  SpO2: 98 % (12/

## 2020-12-15 ENCOUNTER — OFFICE VISIT (OUTPATIENT)
Dept: HEMATOLOGY/ONCOLOGY | Facility: HOSPITAL | Age: 70
End: 2020-12-15
Attending: INTERNAL MEDICINE
Payer: MEDICARE

## 2020-12-15 ENCOUNTER — OFFICE VISIT (OUTPATIENT)
Dept: SURGERY | Facility: CLINIC | Age: 70
End: 2020-12-15
Payer: MEDICARE

## 2020-12-15 ENCOUNTER — NURSE NAVIGATOR ENCOUNTER (OUTPATIENT)
Dept: HEMATOLOGY/ONCOLOGY | Facility: HOSPITAL | Age: 70
End: 2020-12-15

## 2020-12-15 VITALS
SYSTOLIC BLOOD PRESSURE: 128 MMHG | RESPIRATION RATE: 16 BRPM | OXYGEN SATURATION: 98 % | DIASTOLIC BLOOD PRESSURE: 68 MMHG | HEART RATE: 60 BPM

## 2020-12-15 DIAGNOSIS — C50.912 INVASIVE DUCTAL CARCINOMA OF BREAST, FEMALE, LEFT (HCC): Primary | ICD-10-CM

## 2020-12-15 PROCEDURE — 99205 OFFICE O/P NEW HI 60 MIN: CPT | Performed by: INTERNAL MEDICINE

## 2020-12-15 PROCEDURE — 99024 POSTOP FOLLOW-UP VISIT: CPT | Performed by: SURGERY

## 2020-12-15 PROCEDURE — 3074F SYST BP LT 130 MM HG: CPT | Performed by: SURGERY

## 2020-12-15 PROCEDURE — 3078F DIAST BP <80 MM HG: CPT | Performed by: SURGERY

## 2020-12-15 NOTE — PROGRESS NOTES
Pt with left breast cancer. Left mastectomy, 12/8/2020. Taking tylenol for pain. Drain removed today. Pt feeling well, reports some difficulty sleeping, waking up, then unable to fall back asleep.    Education Record    Learner:  Patient/ friend    D

## 2020-12-28 ENCOUNTER — OFFICE VISIT (OUTPATIENT)
Dept: SURGERY | Facility: CLINIC | Age: 70
End: 2020-12-28
Payer: MEDICARE

## 2020-12-28 VITALS — OXYGEN SATURATION: 97 % | SYSTOLIC BLOOD PRESSURE: 134 MMHG | HEART RATE: 55 BPM | DIASTOLIC BLOOD PRESSURE: 80 MMHG

## 2020-12-28 DIAGNOSIS — Z85.3 HISTORY OF LEFT BREAST CANCER: ICD-10-CM

## 2020-12-28 DIAGNOSIS — C50.912 INVASIVE DUCTAL CARCINOMA OF BREAST, FEMALE, LEFT (HCC): Primary | ICD-10-CM

## 2020-12-28 PROCEDURE — 3079F DIAST BP 80-89 MM HG: CPT | Performed by: PHYSICIAN ASSISTANT

## 2020-12-28 PROCEDURE — 3075F SYST BP GE 130 - 139MM HG: CPT | Performed by: PHYSICIAN ASSISTANT

## 2020-12-28 PROCEDURE — 99024 POSTOP FOLLOW-UP VISIT: CPT | Performed by: PHYSICIAN ASSISTANT

## 2020-12-28 NOTE — PROGRESS NOTES
Post-Operative Visit    Diagnosis: Left breast cancer status post left simple mastectomy on 12/8/20    Stage: Cancer Staging  No matching staging information was found for the patient.     Disease Status:  Surgical treatment complete, medical oncology recom Past Surgical History:   Procedure Laterality Date   • BREAST SENTINEL LYMPH NODE BIOPSY Left 12/8/2020    Performed by Le Leigh MD at [facility] MAIN OR   • CHEMOTHERAPY Left 1990    6 months   • LUMPECTOMY LEFT Left 1990    breast cancer with bhavani status: Never Smoker   Smokeless tobacco: Never Used       Review of Systems:  General:   The patient denies, fever, chills, night sweats, fatigue, generalized weakness, change in appetite or weight loss.     HEENT:     The patient denies eye irritation, ca pain.    Neuropsychiatric:  There is no history of migraines or severe headaches, seizure/epilepsy, speech problems, coordination problems, trembling/tremors, fainting/black outs, dizziness, memory problems, loss of sensation/numbness, problems walking, we cervical regions are free of significant lymphadenopathy. Back: There is no vertebral column tenderness. Skin: The skin appears normal. There are no suspicious appearing rashes or lesions. Extremities:  The extremities are without deformity, cyanos

## 2020-12-29 ENCOUNTER — HOSPITAL ENCOUNTER (OUTPATIENT)
Dept: CT IMAGING | Facility: HOSPITAL | Age: 70
Discharge: HOME | End: 2020-12-29
Attending: INTERNAL MEDICINE
Payer: MEDICARE

## 2020-12-29 ENCOUNTER — HOSPITAL ENCOUNTER (OUTPATIENT)
Dept: NUCLEAR MEDICINE | Facility: HOSPITAL | Age: 70
Discharge: HOME | End: 2020-12-29
Attending: INTERNAL MEDICINE
Payer: MEDICARE

## 2020-12-29 DIAGNOSIS — C50.912 INVASIVE DUCTAL CARCINOMA OF BREAST, FEMALE, LEFT (HCC): ICD-10-CM

## 2020-12-29 PROCEDURE — 74177 CT ABD & PELVIS W/CONTRAST: CPT | Performed by: INTERNAL MEDICINE

## 2020-12-29 PROCEDURE — 78306 BONE IMAGING WHOLE BODY: CPT | Performed by: INTERNAL MEDICINE

## 2020-12-29 PROCEDURE — 71260 CT THORAX DX C+: CPT | Performed by: INTERNAL MEDICINE

## 2021-01-04 ENCOUNTER — TELEPHONE (OUTPATIENT)
Dept: HEMATOLOGY/ONCOLOGY | Facility: HOSPITAL | Age: 71
End: 2021-01-04

## 2021-01-04 NOTE — TELEPHONE ENCOUNTER
RN called pt back. Pt notified Dr. Louis Martell reviewed results- her CT and bone scan were ok, nothing concerning for cancer. Oncotype is still pending and we'll call once we have results.      Patient is calling regarding a test result from tumor test and was w

## 2021-01-06 ENCOUNTER — NURSE NAVIGATOR ENCOUNTER (OUTPATIENT)
Dept: HEMATOLOGY/ONCOLOGY | Facility: HOSPITAL | Age: 71
End: 2021-01-06

## 2021-01-06 DIAGNOSIS — C50.912 INVASIVE DUCTAL CARCINOMA OF BREAST, FEMALE, LEFT (HCC): Primary | ICD-10-CM

## 2021-01-06 RX ORDER — ANASTROZOLE 1 MG/1
1 TABLET ORAL DAILY
Qty: 90 TABLET | Refills: 3 | Status: SHIPPED | OUTPATIENT
Start: 2021-01-06 | End: 2021-04-06

## 2021-01-06 NOTE — PROGRESS NOTES
Spoke with patient regarding oncotype results= 14. Dr. Louis Martell is not recommending chemotherapy. Pt is very happy to hear this news. Pt is comfortable with starting anastrozole medication.  Sent to [address], per 's request. Discussed taking med

## 2021-01-08 ENCOUNTER — TELEPHONE (OUTPATIENT)
Dept: INTERNAL MEDICINE CLINIC | Facility: CLINIC | Age: 71
End: 2021-01-08

## 2021-01-08 DIAGNOSIS — C50.912 INVASIVE DUCTAL CARCINOMA OF BREAST, FEMALE, LEFT (HCC): ICD-10-CM

## 2021-01-08 DIAGNOSIS — R92.8 ABNORMAL MAMMOGRAM: Primary | ICD-10-CM

## 2021-01-08 NOTE — TELEPHONE ENCOUNTER
Pt stated the surgeon dr. Duke De León requested pt to get a special bra, pt needs a referral and a order to get the special bra, pt has an appointment next week on the 15th for the measurements and to get the bra ordered, therefore pt needs the refer

## 2021-01-08 NOTE — TELEPHONE ENCOUNTER
Pierre Ott with Dr Shadia Adan office called back and states that they will take care of PA but she will need to do so under Dr Last Durham name as long as KS I ok with that.      I spoke with KS and she authorizes for Dr Shadia Adan office to assist with PA under her na

## 2021-01-08 NOTE — TELEPHONE ENCOUNTER
Received a fax from [facility] request a PA on genetic/molecular testing. Dr Mimi Braswell information is on fax but I am not too sure how to proceed. LMTCB for Dr Mimi Braswell RN.

## 2021-01-11 ENCOUNTER — HOSPITAL ENCOUNTER (OUTPATIENT)
Dept: BONE DENSITY | Age: 71
Discharge: HOME | End: 2021-01-11
Attending: INTERNAL MEDICINE
Payer: MEDICARE

## 2021-01-11 DIAGNOSIS — C50.912 INVASIVE DUCTAL CARCINOMA OF BREAST, FEMALE, LEFT (HCC): ICD-10-CM

## 2021-01-11 PROCEDURE — 77080 DXA BONE DENSITY AXIAL: CPT | Performed by: INTERNAL MEDICINE

## 2021-01-14 NOTE — TELEPHONE ENCOUNTER
Referral authorized and faxed to [facility] at [phone]. Confirmation received. Pt notified.

## 2021-01-15 ENCOUNTER — TELEPHONE (OUTPATIENT)
Dept: ADMINISTRATIVE | Age: 71
End: 2021-01-15

## 2021-01-15 NOTE — TELEPHONE ENCOUNTER
Certificate Information   Certification Number  [id number]  Status  CERTIFIED IN TOTAL  Message  Preauthorization is a determination of medical necessity, not a guarantee of payment. Payment is subject to the member's coverage.  Charges by out of network pro

## 2021-01-21 ENCOUNTER — TELEPHONE (OUTPATIENT)
Dept: HEMATOLOGY/ONCOLOGY | Facility: HOSPITAL | Age: 71
End: 2021-01-21

## 2021-01-21 ENCOUNTER — TELEPHONE (OUTPATIENT)
Dept: SURGERY | Facility: CLINIC | Age: 71
End: 2021-01-21

## 2021-01-21 NOTE — TELEPHONE ENCOUNTER
Returned pt phone call regarding being able to drive  Informed pt that as long as she was not taking narcotics, she was okay to drive from a surgical standpoint  Pt verbalized understanding. All questions were answered.   Encouraged pt to call or Jg hampton

## 2021-01-29 DIAGNOSIS — Z23 NEED FOR VACCINATION: ICD-10-CM

## 2021-02-09 ENCOUNTER — IMMUNIZATION (OUTPATIENT)
Dept: LAB | Age: 71
End: 2021-02-09
Attending: HOSPITALIST
Payer: MEDICARE

## 2021-02-09 DIAGNOSIS — Z23 NEED FOR VACCINATION: Primary | ICD-10-CM

## 2021-02-09 PROCEDURE — 0001A SARSCOV2 VAC 30MCG/0.3ML IM: CPT

## 2021-02-11 ENCOUNTER — OFFICE VISIT (OUTPATIENT)
Dept: HEMATOLOGY/ONCOLOGY | Facility: HOSPITAL | Age: 71
End: 2021-02-11
Attending: INTERNAL MEDICINE
Payer: MEDICARE

## 2021-02-11 DIAGNOSIS — Z08 ENCOUNTER FOR FOLLOW-UP EXAMINATION AFTER COMPLETED TREATMENT FOR MALIGNANT NEOPLASM: ICD-10-CM

## 2021-02-11 DIAGNOSIS — Z85.3 PERSONAL HISTORY OF BREAST CANCER: Primary | ICD-10-CM

## 2021-02-11 DIAGNOSIS — Z71.9 COUNSELING, UNSPECIFIED: ICD-10-CM

## 2021-02-11 PROCEDURE — 99215 OFFICE O/P EST HI 40 MIN: CPT | Performed by: NURSE PRACTITIONER

## 2021-02-11 NOTE — PROGRESS NOTES
I met with Ms. Pratt for a Survivorship Clinic visit to provide a survivorship care plan (SCP) and information related to post-treatment care. She had a friend with her for the visit.   The patient has a diagnosis of left breast cancer, ER+, WI+, HER2- for age and gender including cancer screening tests. She has never had colon screening done and has a home-based stool test that she has not done. I encouraged her to consider getting this done this year.        Reviewed concerning symptoms that she should prosthetics  -ChooseMyPlate.gov handout-nutrition, physical activity  -ACS Cancer Screening Guidelines  -Websites: [address] for your Life, Living Beyond Breast Cancer     The patient was given the opportunity to ask questions.  She had

## 2021-02-16 NOTE — TELEPHONE ENCOUNTER
Pt stated [place] is leaving out [place] on [place]07[place]21 and she will all her refill prior to this date, pt will be using the [facility] and she mentioned this takes longer.  Pt is requesting the following:    metFORMIN HCl 500 MG Oral Tab       Sig

## 2021-02-17 RX ORDER — ATORVASTATIN CALCIUM 20 MG/1
20 TABLET, FILM COATED ORAL NIGHTLY
Qty: 90 TABLET | Refills: 0 | Status: SHIPPED | OUTPATIENT
Start: 2021-02-17 | End: 2021-04-19

## 2021-02-17 RX ORDER — IRBESARTAN 75 MG/1
TABLET ORAL
Qty: 90 TABLET | Refills: 0 | Status: SHIPPED | OUTPATIENT
Start: 2021-02-17 | End: 2021-04-19

## 2021-02-17 NOTE — TELEPHONE ENCOUNTER
metFORMIN HCl 500 MG Oral Tab          Sig: Take 1 tablet (500 mg total) by mouth daily.     Disp:  90 tablet    Refills:  0    Start: 2/16/2021    Class: Normal    Last ordered: 2 months ago by Reported External/Patient     Diabetic Medication Protocol Aren 3/2/2021  8:30 AM [facility] COVID VACCINE RESOURCE [facility] COVIDVAC [place]   3/3/2021  9:00 AM Radha Parrish MD [facility] HEM ONC [place]   4/19/2021  9:30 AM Sung Kramer MD [facility] [place]   6/29/2021  8:30 AM Taisha Almonte MD [facility] [place]

## 2021-02-22 ENCOUNTER — TELEPHONE (OUTPATIENT)
Dept: HEMATOLOGY/ONCOLOGY | Facility: HOSPITAL | Age: 71
End: 2021-02-22

## 2021-02-22 NOTE — TELEPHONE ENCOUNTER
Reviewed with patient that there are refills on the prescription. She will have to contact the pharmacy to see if she can fill it early. May be an insurance issue. Pt verbalized understanding.

## 2021-02-22 NOTE — TELEPHONE ENCOUNTER
Javi called saying she will be out of the country for a little over a month, and asking is she would be able to have a refill of her anastrozole to cover this period of time so she does not run out.

## 2021-03-02 ENCOUNTER — IMMUNIZATION (OUTPATIENT)
Dept: LAB | Age: 71
End: 2021-03-02
Attending: HOSPITALIST
Payer: MEDICARE

## 2021-03-02 DIAGNOSIS — Z23 NEED FOR VACCINATION: Primary | ICD-10-CM

## 2021-03-02 PROCEDURE — 0002A SARSCOV2 VAC 30MCG/0.3ML IM: CPT

## 2021-03-02 NOTE — PROGRESS NOTES
[facility] Progress Note      Patient Name:  Abe Dowd  YOB: 1950  Medical Record Number:  [id number]    Date of visit:  3/3/2021    CHIEF COMPLAINT: L breast carcinoma s/p mastectomy    HPI:     80year old that I init each 3   • Multiple Vitamin (DAILY ESPERANZA) Oral Tab Take 1 tablet by mouth daily. • Cholecalciferol (VITAMIN D) 1000 UNITS Oral Tab Take 1,000 Units by mouth daily. • Calcium Carbonate-Vitamin D 600-400 MG-UNIT Oral Tab Take 1 tablet by mouth daily. post covid vaccine. Should get updated eye exam.     ORDERS PLACED:    Return in about 4 months (around 7/3/2021) for MD visit. Korina Bean M.D.     [facility] [place] [facility] Hematology Oncology [address], [place], [id number]    3/3

## 2021-03-03 ENCOUNTER — OFFICE VISIT (OUTPATIENT)
Dept: HEMATOLOGY/ONCOLOGY | Facility: HOSPITAL | Age: 71
End: 2021-03-03
Attending: INTERNAL MEDICINE
Payer: MEDICARE

## 2021-03-03 VITALS
TEMPERATURE: 97 F | BODY MASS INDEX: 32 KG/M2 | RESPIRATION RATE: 18 BRPM | OXYGEN SATURATION: 100 % | HEART RATE: 59 BPM | SYSTOLIC BLOOD PRESSURE: 145 MMHG | WEIGHT: 166.81 LBS | DIASTOLIC BLOOD PRESSURE: 83 MMHG

## 2021-03-03 DIAGNOSIS — T50.905A ADVERSE EFFECT OF DRUG, INITIAL ENCOUNTER: ICD-10-CM

## 2021-03-03 DIAGNOSIS — C50.912 INVASIVE DUCTAL CARCINOMA OF BREAST, FEMALE, LEFT (HCC): Primary | ICD-10-CM

## 2021-03-03 PROCEDURE — 99214 OFFICE O/P EST MOD 30 MIN: CPT | Performed by: INTERNAL MEDICINE

## 2021-04-19 ENCOUNTER — OFFICE VISIT (OUTPATIENT)
Dept: INTERNAL MEDICINE CLINIC | Facility: CLINIC | Age: 71
End: 2021-04-19
Payer: MEDICARE

## 2021-04-19 ENCOUNTER — LAB ENCOUNTER (OUTPATIENT)
Dept: LAB | Age: 71
End: 2021-04-19
Attending: INTERNAL MEDICINE
Payer: MEDICARE

## 2021-04-19 VITALS
BODY MASS INDEX: 32.86 KG/M2 | DIASTOLIC BLOOD PRESSURE: 62 MMHG | OXYGEN SATURATION: 99 % | HEART RATE: 54 BPM | TEMPERATURE: 98 F | SYSTOLIC BLOOD PRESSURE: 130 MMHG | RESPIRATION RATE: 16 BRPM | WEIGHT: 167.38 LBS | HEIGHT: 60 IN

## 2021-04-19 DIAGNOSIS — E11.59 HYPERTENSION ASSOCIATED WITH DIABETES (HCC): ICD-10-CM

## 2021-04-19 DIAGNOSIS — E11.69 DIABETES MELLITUS TYPE 2 IN OBESE (HCC): ICD-10-CM

## 2021-04-19 DIAGNOSIS — E11.9 TYPE 2 DIABETES MELLITUS WITHOUT COMPLICATION, WITHOUT LONG-TERM CURRENT USE OF INSULIN (HCC): ICD-10-CM

## 2021-04-19 DIAGNOSIS — E11.69 HYPERLIPIDEMIA ASSOCIATED WITH TYPE 2 DIABETES MELLITUS (HCC): ICD-10-CM

## 2021-04-19 DIAGNOSIS — Z00.00 ROUTINE GENERAL MEDICAL EXAMINATION AT A HEALTH CARE FACILITY: Primary | ICD-10-CM

## 2021-04-19 DIAGNOSIS — C50.912 INVASIVE DUCTAL CARCINOMA OF BREAST, FEMALE, LEFT (HCC): ICD-10-CM

## 2021-04-19 DIAGNOSIS — E66.9 DIABETES MELLITUS TYPE 2 IN OBESE (HCC): ICD-10-CM

## 2021-04-19 DIAGNOSIS — I15.2 HYPERTENSION ASSOCIATED WITH DIABETES (HCC): ICD-10-CM

## 2021-04-19 DIAGNOSIS — E78.5 HYPERLIPIDEMIA ASSOCIATED WITH TYPE 2 DIABETES MELLITUS (HCC): ICD-10-CM

## 2021-04-19 DIAGNOSIS — Z12.11 SCREEN FOR COLON CANCER: ICD-10-CM

## 2021-04-19 PROBLEM — M25.531 RIGHT WRIST PAIN: Status: RESOLVED | Noted: 2020-02-17 | Resolved: 2021-04-19

## 2021-04-19 PROBLEM — M65.4 DE QUERVAIN'S TENOSYNOVITIS, RIGHT: Status: RESOLVED | Noted: 2019-11-04 | Resolved: 2021-04-19

## 2021-04-19 PROCEDURE — 3008F BODY MASS INDEX DOCD: CPT | Performed by: INTERNAL MEDICINE

## 2021-04-19 PROCEDURE — 36415 COLL VENOUS BLD VENIPUNCTURE: CPT

## 2021-04-19 PROCEDURE — 3061F NEG MICROALBUMINURIA REV: CPT | Performed by: INTERNAL MEDICINE

## 2021-04-19 PROCEDURE — 80061 LIPID PANEL: CPT

## 2021-04-19 PROCEDURE — 83036 HEMOGLOBIN GLYCOSYLATED A1C: CPT

## 2021-04-19 PROCEDURE — 82570 ASSAY OF URINE CREATININE: CPT

## 2021-04-19 PROCEDURE — 96160 PT-FOCUSED HLTH RISK ASSMT: CPT | Performed by: INTERNAL MEDICINE

## 2021-04-19 PROCEDURE — 84450 TRANSFERASE (AST) (SGOT): CPT

## 2021-04-19 PROCEDURE — 80048 BASIC METABOLIC PNL TOTAL CA: CPT

## 2021-04-19 PROCEDURE — G0439 PPPS, SUBSEQ VISIT: HCPCS | Performed by: INTERNAL MEDICINE

## 2021-04-19 PROCEDURE — 3075F SYST BP GE 130 - 139MM HG: CPT | Performed by: INTERNAL MEDICINE

## 2021-04-19 PROCEDURE — 3078F DIAST BP <80 MM HG: CPT | Performed by: INTERNAL MEDICINE

## 2021-04-19 PROCEDURE — 99397 PER PM REEVAL EST PAT 65+ YR: CPT | Performed by: INTERNAL MEDICINE

## 2021-04-19 PROCEDURE — 82043 UR ALBUMIN QUANTITATIVE: CPT

## 2021-04-19 PROCEDURE — 84460 ALANINE AMINO (ALT) (SGPT): CPT

## 2021-04-19 RX ORDER — ANASTROZOLE 1 MG/1
1 TABLET ORAL DAILY
COMMUNITY
Start: 2021-04-16 | End: 2021-04-26

## 2021-04-19 RX ORDER — IRBESARTAN 75 MG/1
TABLET ORAL
Qty: 90 TABLET | Refills: 3 | Status: SHIPPED | OUTPATIENT
Start: 2021-04-19 | End: 2021-05-27

## 2021-04-19 RX ORDER — ATORVASTATIN CALCIUM 20 MG/1
20 TABLET, FILM COATED ORAL NIGHTLY
Qty: 90 TABLET | Refills: 3 | Status: SHIPPED | OUTPATIENT
Start: 2021-04-19 | End: 2021-05-27

## 2021-04-19 NOTE — PATIENT INSTRUCTIONS
Please complete your labs today. I also advise you get the shingrix vaccine once in a lifetime. This is NEW and considered better than the previous shingles vaccine named, zostavax. It can cost up to $200.     I advise you get the annual flu shot every

## 2021-04-19 NOTE — PROGRESS NOTES
Mony Yanes is a 80year old female. HPI:   Patient presents for medicare supervisit and additional issues noted below. 1. Left breast cancer: new diagnosis in 10/2020. Underwent mastectomy and now on arimidex.    2. DM: tolerating metformin 1990    left breast   • Breast cancer in female [facility]) 1990    s/p chemo and radiation   • Diabetes ([facility] [address]    • Exposure to medical diagnostic radiation    • High blood pressure    • High cholesterol    • Osteoarthritis    • Personal history of antineoplasti 1/21, continue till 1/26. Due for right mammogram 10/21. DEXA 1/21 was normal, next due 1/23. # Absent Pedal Pulses bilaterally: abnormal screen through medicare. No claudication symptoms.  ABIs were normal in 2019 but did show small vessel changes in eryn recommended colonoscopy but she declines. Cervical Cancer Screening: neg cytology, HPV in 1/2017. Does not need repeat. Breast Cancer Screening: mammogram per oncology  Bone Health: normal DEXA in 2021.  educated on dietary calcium/vit D3, weight bearing

## 2021-04-26 DIAGNOSIS — C50.912 INVASIVE DUCTAL CARCINOMA OF BREAST, FEMALE, LEFT (HCC): Primary | ICD-10-CM

## 2021-04-26 RX ORDER — ANASTROZOLE 1 MG/1
1 TABLET ORAL DAILY
Qty: 30 TABLET | Refills: 3 | Status: SHIPPED | OUTPATIENT
Start: 2021-04-26 | End: 2021-08-06

## 2021-04-26 NOTE — TELEPHONE ENCOUNTER
Patient need a refill on her Anastrozole 1 mg oral tab and she is completely out of medication for 1 week. Please send to [address] [place] [address].

## 2021-05-27 RX ORDER — IRBESARTAN 75 MG/1
TABLET ORAL
Qty: 90 TABLET | Refills: 3 | Status: SHIPPED | OUTPATIENT
Start: 2021-05-27 | End: 2021-10-25

## 2021-05-27 RX ORDER — ATORVASTATIN CALCIUM 20 MG/1
TABLET, FILM COATED ORAL
Qty: 90 TABLET | Refills: 3 | Status: SHIPPED | OUTPATIENT
Start: 2021-05-27

## 2021-05-27 NOTE — TELEPHONE ENCOUNTER
To early for refill  Atorvastatin 20 mg  Filled 4-19-21  Qty 90  3 refills  No upcoming appt. LOV 4-19-21  Labs: 4-19-21 Lipid    To early for refill  Irbesartan 75 mg  Filled 4-19-21  Qty 90  3 refills  No upcoming appt.    LOV 4-19-21  Labs: 4-19-21 BMP

## 2021-07-23 ENCOUNTER — OFFICE VISIT (OUTPATIENT)
Dept: SURGERY | Facility: CLINIC | Age: 71
End: 2021-07-23
Payer: MEDICARE

## 2021-07-23 ENCOUNTER — NURSE NAVIGATOR ENCOUNTER (OUTPATIENT)
Dept: HEMATOLOGY/ONCOLOGY | Facility: HOSPITAL | Age: 71
End: 2021-07-23

## 2021-07-23 VITALS
DIASTOLIC BLOOD PRESSURE: 80 MMHG | HEIGHT: 60 IN | BODY MASS INDEX: 31.41 KG/M2 | WEIGHT: 160 LBS | SYSTOLIC BLOOD PRESSURE: 143 MMHG | RESPIRATION RATE: 16 BRPM | OXYGEN SATURATION: 95 % | HEART RATE: 70 BPM

## 2021-07-23 DIAGNOSIS — Z12.31 ENCOUNTER FOR SCREENING MAMMOGRAM FOR HIGH-RISK PATIENT: ICD-10-CM

## 2021-07-23 DIAGNOSIS — C50.912 INVASIVE DUCTAL CARCINOMA OF BREAST, FEMALE, LEFT (HCC): Primary | ICD-10-CM

## 2021-07-23 DIAGNOSIS — Z85.3 HISTORY OF LEFT BREAST CANCER: ICD-10-CM

## 2021-07-23 PROCEDURE — 3079F DIAST BP 80-89 MM HG: CPT | Performed by: SURGERY

## 2021-07-23 PROCEDURE — 3008F BODY MASS INDEX DOCD: CPT | Performed by: SURGERY

## 2021-07-23 PROCEDURE — 3077F SYST BP >= 140 MM HG: CPT | Performed by: SURGERY

## 2021-07-23 PROCEDURE — 99214 OFFICE O/P EST MOD 30 MIN: CPT | Performed by: SURGERY

## 2021-07-23 NOTE — PROGRESS NOTES
Breast Surgery Surveillance Visit    Diagnosis: Left breast cancer status post left simple mastectomy on 12/8/20    Stage: Cancer Staging  No matching staging information was found for the patient.     Disease Status:  Surgical treatment complete, anastrozo High cholesterol    • Osteoarthritis    • Personal history of antineoplastic chemotherapy 1990       Past Surgical History:   Procedure Laterality Date   • CHEMOTHERAPY Left 1990    6 months   • LUMPECTOMY LEFT Left 1990    breast cancer with chemo/radiati denies, fever, chills, night sweats, fatigue, generalized weakness, change in appetite or weight loss. HEENT:     The patient denies eye irritation, cataracts, redness, glaucoma, yellowing of the eyes, change in vision or color blindness.  The patient de coordination problems, trembling/tremors, fainting/black outs, dizziness, memory problems, loss of sensation/numbness, problems walking, weakness, tingling or burning in hands/feet.  There is no history of abusive relationship, bipolar disorder, sleep distu cervical regions are free of significant lymphadenopathy. Back: There is no vertebral column tenderness. Skin: The skin appears normal. There are no suspicious appearing rashes or lesions. Extremities:  The extremities are without deformity, cyanos

## 2021-07-23 NOTE — PROGRESS NOTES
Phoned patient at request of Dr. Sofia Shone. Patient needs a follow up appointment with Dr. Laurence Sewell, as she is almost out of aromatase inhibitor. Phoned patient and LVM, requested return phone call.

## 2021-08-06 DIAGNOSIS — C50.912 INVASIVE DUCTAL CARCINOMA OF BREAST, FEMALE, LEFT (HCC): ICD-10-CM

## 2021-08-06 RX ORDER — ANASTROZOLE 1 MG/1
1 TABLET ORAL DAILY
Qty: 30 TABLET | Refills: 3 | Status: SHIPPED | OUTPATIENT
Start: 2021-08-06 | End: 2022-01-31

## 2021-08-06 NOTE — TELEPHONE ENCOUNTER
Patient need a refill for her Anastrozole 1 mg oral tab, [facility]

## 2021-08-09 ENCOUNTER — TELEPHONE (OUTPATIENT)
Dept: HEMATOLOGY/ONCOLOGY | Facility: HOSPITAL | Age: 71
End: 2021-08-09

## 2021-08-09 NOTE — TELEPHONE ENCOUNTER
----- Message from Deanna Gonzalez RN sent at 8/6/2021  1:05 PM CDT -----  Please call for follow up with Krysta Hopkins.      Thanks  Halley

## 2021-08-12 ENCOUNTER — TELEPHONE (OUTPATIENT)
Dept: ADMINISTRATIVE | Age: 71
End: 2021-08-12

## 2021-08-12 DIAGNOSIS — Z87.898 HISTORY OF LUMP OF LEFT BREAST: Primary | ICD-10-CM

## 2021-08-12 NOTE — TELEPHONE ENCOUNTER
Patient called requesting f/u oncology referral with Dr Nickolas Syed.  Patient had surgery and has had some f/u visits in the past

## 2021-09-06 NOTE — PROGRESS NOTES
[facility] Progress Note      Patient Name:  Corby Soria  YOB: 1950  Medical Record Number:  [id number]    Date of visit:  9/7/2021    CHIEF COMPLAINT: L breast ca s/p mastectomy    HPI:     80year old that I have follow daily.     • Calcium Carbonate-Vitamin D 600-400 MG-UNIT Oral Tab Take 1 tablet by mouth daily.          VITALS:  Height: --  Weight: --  BSA (Calculated - sq m): --  Pulse: --  BP: --  Temp: --  Do Not Use - Resp Rate: --  SpO2: --    PS:  ECO    PHYSI

## 2021-09-07 ENCOUNTER — APPOINTMENT (OUTPATIENT)
Dept: HEMATOLOGY/ONCOLOGY | Facility: HOSPITAL | Age: 71
End: 2021-09-07
Attending: INTERNAL MEDICINE
Payer: MEDICARE

## 2021-10-15 ENCOUNTER — TELEPHONE (OUTPATIENT)
Dept: INTERNAL MEDICINE CLINIC | Facility: CLINIC | Age: 71
End: 2021-10-15

## 2021-10-15 DIAGNOSIS — E78.5 HYPERLIPIDEMIA ASSOCIATED WITH TYPE 2 DIABETES MELLITUS (HCC): ICD-10-CM

## 2021-10-15 DIAGNOSIS — E11.69 DIABETES MELLITUS TYPE 2 IN OBESE (HCC): Primary | ICD-10-CM

## 2021-10-15 DIAGNOSIS — E66.9 DIABETES MELLITUS TYPE 2 IN OBESE (HCC): Primary | ICD-10-CM

## 2021-10-15 DIAGNOSIS — E11.69 HYPERLIPIDEMIA ASSOCIATED WITH TYPE 2 DIABETES MELLITUS (HCC): ICD-10-CM

## 2021-10-15 NOTE — TELEPHONE ENCOUNTER
Patient is requesting a referral to an eye doctor for her diabetic eye exam.     Dr. Edna Barksdale  [address] [place] [address].   [facility] [address] [place] [address]  Phone: [phone]  Fax: [phone]

## 2021-10-19 ENCOUNTER — LAB ENCOUNTER (OUTPATIENT)
Dept: LAB | Age: 71
End: 2021-10-19
Attending: INTERNAL MEDICINE
Payer: MEDICARE

## 2021-10-19 DIAGNOSIS — E66.9 DIABETES MELLITUS TYPE 2 IN OBESE (HCC): ICD-10-CM

## 2021-10-19 DIAGNOSIS — E78.5 HYPERLIPIDEMIA ASSOCIATED WITH TYPE 2 DIABETES MELLITUS (HCC): ICD-10-CM

## 2021-10-19 DIAGNOSIS — E11.69 HYPERLIPIDEMIA ASSOCIATED WITH TYPE 2 DIABETES MELLITUS (HCC): ICD-10-CM

## 2021-10-19 DIAGNOSIS — E11.69 DIABETES MELLITUS TYPE 2 IN OBESE (HCC): ICD-10-CM

## 2021-10-19 PROCEDURE — 83036 HEMOGLOBIN GLYCOSYLATED A1C: CPT

## 2021-10-19 PROCEDURE — 3044F HG A1C LEVEL LT 7.0%: CPT | Performed by: INTERNAL MEDICINE

## 2021-10-19 PROCEDURE — 84450 TRANSFERASE (AST) (SGOT): CPT

## 2021-10-19 PROCEDURE — 84460 ALANINE AMINO (ALT) (SGPT): CPT

## 2021-10-19 PROCEDURE — 80048 BASIC METABOLIC PNL TOTAL CA: CPT

## 2021-10-19 PROCEDURE — 36415 COLL VENOUS BLD VENIPUNCTURE: CPT

## 2021-10-25 ENCOUNTER — OFFICE VISIT (OUTPATIENT)
Dept: INTERNAL MEDICINE CLINIC | Facility: CLINIC | Age: 71
End: 2021-10-25
Payer: MEDICARE

## 2021-10-25 VITALS
HEIGHT: 60 IN | BODY MASS INDEX: 32.52 KG/M2 | WEIGHT: 165.63 LBS | SYSTOLIC BLOOD PRESSURE: 140 MMHG | OXYGEN SATURATION: 100 % | HEART RATE: 57 BPM | TEMPERATURE: 98 F | RESPIRATION RATE: 20 BRPM | DIASTOLIC BLOOD PRESSURE: 62 MMHG

## 2021-10-25 DIAGNOSIS — E11.69 DIABETES MELLITUS TYPE 2 IN OBESE (HCC): ICD-10-CM

## 2021-10-25 DIAGNOSIS — C50.912 INVASIVE DUCTAL CARCINOMA OF BREAST, FEMALE, LEFT (HCC): Primary | ICD-10-CM

## 2021-10-25 DIAGNOSIS — E11.59 HYPERTENSION ASSOCIATED WITH DIABETES (HCC): ICD-10-CM

## 2021-10-25 DIAGNOSIS — E66.9 DIABETES MELLITUS TYPE 2 IN OBESE (HCC): ICD-10-CM

## 2021-10-25 DIAGNOSIS — I15.2 HYPERTENSION ASSOCIATED WITH DIABETES (HCC): ICD-10-CM

## 2021-10-25 DIAGNOSIS — E11.9 TYPE 2 DIABETES MELLITUS WITHOUT COMPLICATION, WITHOUT LONG-TERM CURRENT USE OF INSULIN (HCC): ICD-10-CM

## 2021-10-25 DIAGNOSIS — E11.69 HYPERLIPIDEMIA ASSOCIATED WITH TYPE 2 DIABETES MELLITUS (HCC): ICD-10-CM

## 2021-10-25 DIAGNOSIS — E78.5 HYPERLIPIDEMIA ASSOCIATED WITH TYPE 2 DIABETES MELLITUS (HCC): ICD-10-CM

## 2021-10-25 PROCEDURE — 99214 OFFICE O/P EST MOD 30 MIN: CPT | Performed by: INTERNAL MEDICINE

## 2021-10-25 PROCEDURE — 3008F BODY MASS INDEX DOCD: CPT | Performed by: INTERNAL MEDICINE

## 2021-10-25 PROCEDURE — 3077F SYST BP >= 140 MM HG: CPT | Performed by: INTERNAL MEDICINE

## 2021-10-25 PROCEDURE — 3078F DIAST BP <80 MM HG: CPT | Performed by: INTERNAL MEDICINE

## 2021-10-25 RX ORDER — IRBESARTAN 150 MG/1
150 TABLET ORAL NIGHTLY
Qty: 90 TABLET | Refills: 1 | Status: SHIPPED | OUTPATIENT
Start: 2021-10-25 | End: 2022-02-04

## 2021-10-25 NOTE — PROGRESS NOTES
Mony Yanes is a 80year old female. HPI:   Patient presents for the following issues. 1. DM: she checks her sugars infrequently. 2. HTN: checking BP once/month. Does not recall values.    3. Low back pain: she has not been doing HEP becau Smoking status: Never Smoker      Smokeless tobacco: Never Used    Vaping Use      Vaping Use: Never used    Alcohol use: No      Alcohol/week: 0.0 standard drinks    Drug use: No          EXAM:   /62 (BP Location: Right arm)   Pulse 57   Temp 97.8 ° but stable. Needs to cont HEP. 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 Continue daily HEP. Has completed PT at end of 2019. # GERD and epigastric tenderness: has been told she has duodenal erythema on past EGDs in [place].  Her

## 2021-10-25 NOTE — PATIENT INSTRUCTIONS
You are due for your third COVID booster now. Your blood pressure is not controlled. Please increase your irbesartan to 150 mg every evening. Please bring your machine into your next office visit to ensure it is accurate. I like the OMRON brand.  Rangel

## 2021-10-28 ENCOUNTER — HOSPITAL ENCOUNTER (OUTPATIENT)
Dept: MAMMOGRAPHY | Age: 71
Discharge: HOME | End: 2021-10-28
Attending: SURGERY
Payer: MEDICARE

## 2021-10-28 DIAGNOSIS — Z12.31 ENCOUNTER FOR SCREENING MAMMOGRAM FOR HIGH-RISK PATIENT: ICD-10-CM

## 2021-10-28 PROCEDURE — 77063 BREAST TOMOSYNTHESIS BI: CPT | Performed by: SURGERY

## 2021-10-28 PROCEDURE — 77067 SCR MAMMO BI INCL CAD: CPT | Performed by: SURGERY

## 2021-11-01 NOTE — TELEPHONE ENCOUNTER
Pt requesting to have referral for Dr Ruchi Barbosa faxed to their office. Per pt Dr Ruchi Barbosa office did not receive our fax.      Fax - [phone]

## 2022-01-28 DIAGNOSIS — C50.912 INVASIVE DUCTAL CARCINOMA OF BREAST, FEMALE, LEFT (HCC): ICD-10-CM

## 2022-01-31 ENCOUNTER — TELEPHONE (OUTPATIENT)
Dept: HEMATOLOGY/ONCOLOGY | Facility: HOSPITAL | Age: 72
End: 2022-01-31

## 2022-01-31 RX ORDER — ANASTROZOLE 1 MG/1
TABLET ORAL
Qty: 90 TABLET | Refills: 0 | Status: SHIPPED | OUTPATIENT
Start: 2022-01-31 | End: 2022-02-01

## 2022-02-01 DIAGNOSIS — C50.912 INVASIVE DUCTAL CARCINOMA OF BREAST, FEMALE, LEFT (HCC): ICD-10-CM

## 2022-02-01 RX ORDER — ANASTROZOLE 1 MG/1
1 TABLET ORAL DAILY
Qty: 90 TABLET | Refills: 0 | Status: SHIPPED | OUTPATIENT
Start: 2022-02-01 | End: 2022-02-08

## 2022-02-04 ENCOUNTER — TELEPHONE (OUTPATIENT)
Dept: INTERNAL MEDICINE CLINIC | Facility: CLINIC | Age: 72
End: 2022-02-04

## 2022-02-04 ENCOUNTER — OFFICE VISIT (OUTPATIENT)
Dept: INTERNAL MEDICINE CLINIC | Facility: CLINIC | Age: 72
End: 2022-02-04
Payer: MEDICARE

## 2022-02-04 VITALS
WEIGHT: 158.81 LBS | RESPIRATION RATE: 16 BRPM | SYSTOLIC BLOOD PRESSURE: 140 MMHG | BODY MASS INDEX: 31.18 KG/M2 | OXYGEN SATURATION: 99 % | DIASTOLIC BLOOD PRESSURE: 60 MMHG | HEIGHT: 60 IN | HEART RATE: 54 BPM | TEMPERATURE: 98 F

## 2022-02-04 DIAGNOSIS — C50.912 INVASIVE DUCTAL CARCINOMA OF BREAST, FEMALE, LEFT (HCC): ICD-10-CM

## 2022-02-04 DIAGNOSIS — E11.9 TYPE 2 DIABETES MELLITUS WITHOUT COMPLICATION, WITHOUT LONG-TERM CURRENT USE OF INSULIN (HCC): ICD-10-CM

## 2022-02-04 DIAGNOSIS — E11.69 HYPERLIPIDEMIA ASSOCIATED WITH TYPE 2 DIABETES MELLITUS (HCC): ICD-10-CM

## 2022-02-04 DIAGNOSIS — I15.2 HYPERTENSION ASSOCIATED WITH DIABETES (HCC): ICD-10-CM

## 2022-02-04 DIAGNOSIS — Z00.00 ROUTINE GENERAL MEDICAL EXAMINATION AT A HEALTH CARE FACILITY: Primary | ICD-10-CM

## 2022-02-04 DIAGNOSIS — E66.9 DIABETES MELLITUS TYPE 2 IN OBESE (HCC): ICD-10-CM

## 2022-02-04 DIAGNOSIS — E11.69 DIABETES MELLITUS TYPE 2 IN OBESE (HCC): ICD-10-CM

## 2022-02-04 DIAGNOSIS — E11.59 HYPERTENSION ASSOCIATED WITH DIABETES (HCC): ICD-10-CM

## 2022-02-04 DIAGNOSIS — E78.5 HYPERLIPIDEMIA ASSOCIATED WITH TYPE 2 DIABETES MELLITUS (HCC): ICD-10-CM

## 2022-02-04 PROCEDURE — 96160 PT-FOCUSED HLTH RISK ASSMT: CPT | Performed by: INTERNAL MEDICINE

## 2022-02-04 PROCEDURE — 99397 PER PM REEVAL EST PAT 65+ YR: CPT | Performed by: INTERNAL MEDICINE

## 2022-02-04 PROCEDURE — 3008F BODY MASS INDEX DOCD: CPT | Performed by: INTERNAL MEDICINE

## 2022-02-04 PROCEDURE — 3077F SYST BP >= 140 MM HG: CPT | Performed by: INTERNAL MEDICINE

## 2022-02-04 PROCEDURE — 3078F DIAST BP <80 MM HG: CPT | Performed by: INTERNAL MEDICINE

## 2022-02-04 PROCEDURE — G0439 PPPS, SUBSEQ VISIT: HCPCS | Performed by: INTERNAL MEDICINE

## 2022-02-04 RX ORDER — IRBESARTAN 300 MG/1
300 TABLET ORAL NIGHTLY
Qty: 90 TABLET | Refills: 2 | Status: SHIPPED | OUTPATIENT
Start: 2022-02-04

## 2022-02-04 NOTE — TELEPHONE ENCOUNTER
Incoming diabetic eye exam via fax from [facility]. HM has been updated, report placed in KS in basket.

## 2022-02-04 NOTE — PATIENT INSTRUCTIONS
Please increase your irbesartan to 300 mg every evening. Please bring your machine into your next office visit to ensure it is accurate. I like the OMRON brand. Please keep the receipt. Please measure your blood pressure and pulse daily and record these values. Please measure your blood pressure two times in the morning and two times in the evening (1 minute apart) after when you have been relaxing for at least 5 minutes. Both feet should be flat on the ground and you should be seated. Please bring these values in at your next visit. Please call us if your blood pressure is greater than 130/80 on 3 occasions. Please complete your labs and urine studies in April, 2022. You do not need to fast.     I also advise you get the shingrix vaccine once in a lifetime. This is NEW and considered better than the previous shingles vaccine named, zostavax. It can cost up to $200. I advise you get the annual flu shot every September, October.

## 2022-02-08 ENCOUNTER — OFFICE VISIT (OUTPATIENT)
Dept: HEMATOLOGY/ONCOLOGY | Facility: HOSPITAL | Age: 72
End: 2022-02-08
Attending: INTERNAL MEDICINE
Payer: MEDICARE

## 2022-02-08 VITALS
BODY MASS INDEX: 31 KG/M2 | OXYGEN SATURATION: 97 % | DIASTOLIC BLOOD PRESSURE: 80 MMHG | HEART RATE: 59 BPM | SYSTOLIC BLOOD PRESSURE: 142 MMHG | RESPIRATION RATE: 18 BRPM | WEIGHT: 158.5 LBS | TEMPERATURE: 97 F

## 2022-02-08 DIAGNOSIS — Z87.898 HISTORY OF LUMP OF LEFT BREAST: ICD-10-CM

## 2022-02-08 DIAGNOSIS — C50.912 INVASIVE DUCTAL CARCINOMA OF BREAST, FEMALE, LEFT (HCC): Primary | ICD-10-CM

## 2022-02-08 DIAGNOSIS — M89.9 BONE DISORDER: ICD-10-CM

## 2022-02-08 PROCEDURE — 99214 OFFICE O/P EST MOD 30 MIN: CPT | Performed by: INTERNAL MEDICINE

## 2022-02-08 RX ORDER — ANASTROZOLE 1 MG/1
1 TABLET ORAL DAILY
Qty: 90 TABLET | Refills: 3 | Status: SHIPPED | OUTPATIENT
Start: 2022-02-08

## 2022-02-08 NOTE — PROGRESS NOTES
Education Record    Learner:  Patient/ family member    Disease / Diagnosis: breast cancer. Barriers / Limitations:  None   Comments:    Method:  Discussion   Comments:    General Topics:  Plan of care reviewed   Comments:    Outcome:  Shows understanding   Comments:  Pt taking anastrozole. Some back pain when standing, goes away when sits down   Breast imaging up to date.

## 2022-04-16 ENCOUNTER — HOSPITAL ENCOUNTER (OUTPATIENT)
Dept: MAMMOGRAPHY | Age: 72
Discharge: HOME | End: 2022-04-16
Attending: INTERNAL MEDICINE
Payer: MEDICARE

## 2022-04-16 DIAGNOSIS — C50.912 INVASIVE DUCTAL CARCINOMA OF BREAST, FEMALE, LEFT (HCC): ICD-10-CM

## 2022-04-16 DIAGNOSIS — Z12.31 SCREENING MAMMOGRAM FOR BREAST CANCER: ICD-10-CM

## 2022-04-26 ENCOUNTER — TELEPHONE (OUTPATIENT)
Dept: HEMATOLOGY/ONCOLOGY | Facility: HOSPITAL | Age: 72
End: 2022-04-26

## 2022-04-26 NOTE — TELEPHONE ENCOUNTER
Received call from radiology to update mammogram order due to pt complaint of breast pain, when she came for screening mammogram.   Reviewed with Bernard Farias , and order updated.

## 2022-04-26 NOTE — TELEPHONE ENCOUNTER
Anthony said she called earlier today because she could not get her mammogram done. She was told someone would call her to schedule her test. She has not heard from anyone yet. She is asking for a call back from Dr Paul Guerrero nurse this afternoon.

## 2022-05-10 ENCOUNTER — HOSPITAL ENCOUNTER (OUTPATIENT)
Dept: MAMMOGRAPHY | Facility: HOSPITAL | Age: 72
Discharge: HOME | End: 2022-05-10
Attending: INTERNAL MEDICINE
Payer: MEDICARE

## 2022-05-10 DIAGNOSIS — N64.4 BREAST PAIN: ICD-10-CM

## 2022-05-10 PROCEDURE — 77065 DX MAMMO INCL CAD UNI: CPT | Performed by: INTERNAL MEDICINE

## 2022-05-10 PROCEDURE — 76642 ULTRASOUND BREAST LIMITED: CPT | Performed by: INTERNAL MEDICINE

## 2022-05-10 PROCEDURE — 77061 BREAST TOMOSYNTHESIS UNI: CPT | Performed by: INTERNAL MEDICINE

## 2022-06-23 RX ORDER — ATORVASTATIN CALCIUM 20 MG/1
20 TABLET, FILM COATED ORAL NIGHTLY
Qty: 90 TABLET | Refills: 0 | Status: SHIPPED | OUTPATIENT
Start: 2022-06-23 | End: 2022-12-02

## 2022-06-23 NOTE — TELEPHONE ENCOUNTER
Spoke to pt. She forgot about lab work. Central scheduling's number was given to her to set an appt. Up to complete labs. Pt is out of medication, needs Rx sent to [facility]. Atorvastatin 20 mg  Cholesterol Medication Protocol Failed 06/22/2022 04:29 PM   Protocol Details  Lipid panel within past 12 months   Filled 5-27-21  Qty 90  3 refills  No upcoming appt.   LOV 2-4-22

## 2022-07-06 ENCOUNTER — LAB ENCOUNTER (OUTPATIENT)
Dept: LAB | Age: 72
End: 2022-07-06
Attending: INTERNAL MEDICINE
Payer: MEDICARE

## 2022-07-06 DIAGNOSIS — C50.912 INVASIVE DUCTAL CARCINOMA OF BREAST, FEMALE, LEFT (HCC): ICD-10-CM

## 2022-07-06 DIAGNOSIS — E11.69 DIABETES MELLITUS TYPE 2 IN OBESE (HCC): ICD-10-CM

## 2022-07-06 DIAGNOSIS — E78.5 HYPERLIPIDEMIA ASSOCIATED WITH TYPE 2 DIABETES MELLITUS (HCC): ICD-10-CM

## 2022-07-06 DIAGNOSIS — E66.9 DIABETES MELLITUS TYPE 2 IN OBESE (HCC): ICD-10-CM

## 2022-07-06 DIAGNOSIS — E11.59 HYPERTENSION ASSOCIATED WITH DIABETES (HCC): ICD-10-CM

## 2022-07-06 DIAGNOSIS — I15.2 HYPERTENSION ASSOCIATED WITH DIABETES (HCC): ICD-10-CM

## 2022-07-06 DIAGNOSIS — E11.9 TYPE 2 DIABETES MELLITUS WITHOUT COMPLICATION, WITHOUT LONG-TERM CURRENT USE OF INSULIN (HCC): ICD-10-CM

## 2022-07-06 DIAGNOSIS — E11.69 HYPERLIPIDEMIA ASSOCIATED WITH TYPE 2 DIABETES MELLITUS (HCC): ICD-10-CM

## 2022-07-06 LAB
ALT SERPL-CCNC: 19 U/L
ANION GAP SERPL CALC-SCNC: 5 MMOL/L (ref 0–18)
AST SERPL-CCNC: 19 U/L (ref 15–37)
BUN BLD-MCNC: 20 MG/DL (ref 7–18)
CALCIUM BLD-MCNC: 10.2 MG/DL (ref 8.5–10.1)
CHLORIDE SERPL-SCNC: 107 MMOL/L (ref 98–112)
CHOLEST SERPL-MCNC: 159 MG/DL (ref ?–200)
CO2 SERPL-SCNC: 29 MMOL/L (ref 21–32)
CREAT BLD-MCNC: 0.83 MG/DL
CREAT UR-SCNC: 51.6 MG/DL
EST. AVERAGE GLUCOSE BLD GHB EST-MCNC: 143 MG/DL (ref 68–126)
FASTING PATIENT LIPID ANSWER: YES
FASTING STATUS PATIENT QL REPORTED: YES
GLUCOSE BLD-MCNC: 102 MG/DL (ref 70–99)
HBA1C MFR BLD: 6.6 % (ref ?–5.7)
HDLC SERPL-MCNC: 66 MG/DL (ref 40–59)
LDLC SERPL CALC-MCNC: 74 MG/DL (ref ?–100)
MICROALBUMIN UR-MCNC: <0.5 MG/DL
NONHDLC SERPL-MCNC: 93 MG/DL (ref ?–130)
OSMOLALITY SERPL CALC.SUM OF ELEC: 295 MOSM/KG (ref 275–295)
POTASSIUM SERPL-SCNC: 4.4 MMOL/L (ref 3.5–5.1)
SODIUM SERPL-SCNC: 141 MMOL/L (ref 136–145)
TRIGL SERPL-MCNC: 106 MG/DL (ref 30–149)
VLDLC SERPL CALC-MCNC: 16 MG/DL (ref 0–30)

## 2022-07-06 PROCEDURE — 80061 LIPID PANEL: CPT

## 2022-07-06 PROCEDURE — 80048 BASIC METABOLIC PNL TOTAL CA: CPT

## 2022-07-06 PROCEDURE — 83036 HEMOGLOBIN GLYCOSYLATED A1C: CPT

## 2022-07-06 PROCEDURE — 84460 ALANINE AMINO (ALT) (SGPT): CPT

## 2022-07-06 PROCEDURE — 84450 TRANSFERASE (AST) (SGOT): CPT

## 2022-07-06 PROCEDURE — 36415 COLL VENOUS BLD VENIPUNCTURE: CPT

## 2022-07-06 PROCEDURE — 82043 UR ALBUMIN QUANTITATIVE: CPT

## 2022-07-06 PROCEDURE — 82570 ASSAY OF URINE CREATININE: CPT

## 2022-08-24 RX ORDER — IRBESARTAN 300 MG/1
300 TABLET ORAL NIGHTLY
Qty: 90 TABLET | Refills: 0 | Status: SHIPPED | OUTPATIENT
Start: 2022-08-24 | End: 2023-05-03

## 2022-08-24 NOTE — TELEPHONE ENCOUNTER
LOV: 2/4/2022 with Dr. Arcenio Ba  RTC: 6 months  Last Relevant Labs: 7/6/2022  Filled: 2/4/2022     #90 with 2 refills    No future appointments.

## 2022-09-30 NOTE — TELEPHONE ENCOUNTER
Pt requesting refill:    METFORMIN 500 MG Oral Tab    [facility], [address] [place] [address] [phone], [phone]

## 2023-03-28 DIAGNOSIS — C50.912 INVASIVE DUCTAL CARCINOMA OF BREAST, FEMALE, LEFT (HCC): ICD-10-CM

## 2023-03-28 RX ORDER — ANASTROZOLE 1 MG/1
TABLET ORAL
Qty: 90 TABLET | Refills: 0 | Status: SHIPPED | OUTPATIENT
Start: 2023-03-28

## 2023-04-14 ENCOUNTER — APPOINTMENT (OUTPATIENT)
Dept: HEMATOLOGY/ONCOLOGY | Facility: HOSPITAL | Age: 73
End: 2023-04-14
Attending: INTERNAL MEDICINE
Payer: MEDICARE

## 2023-04-18 ENCOUNTER — OFFICE VISIT (OUTPATIENT)
Dept: HEMATOLOGY/ONCOLOGY | Facility: HOSPITAL | Age: 73
End: 2023-04-18
Attending: INTERNAL MEDICINE
Payer: MEDICARE

## 2023-04-18 VITALS
SYSTOLIC BLOOD PRESSURE: 162 MMHG | OXYGEN SATURATION: 100 % | WEIGHT: 167 LBS | HEART RATE: 60 BPM | BODY MASS INDEX: 33 KG/M2 | RESPIRATION RATE: 18 BRPM | TEMPERATURE: 98 F | DIASTOLIC BLOOD PRESSURE: 81 MMHG

## 2023-04-18 DIAGNOSIS — C50.912 INVASIVE DUCTAL CARCINOMA OF BREAST, FEMALE, LEFT (HCC): ICD-10-CM

## 2023-04-18 DIAGNOSIS — T50.905D ADVERSE EFFECT OF DRUG, SUBSEQUENT ENCOUNTER: ICD-10-CM

## 2023-04-18 DIAGNOSIS — Z78.0 POSTMENOPAUSAL: Primary | ICD-10-CM

## 2023-04-18 PROCEDURE — 99214 OFFICE O/P EST MOD 30 MIN: CPT | Performed by: INTERNAL MEDICINE

## 2023-04-18 RX ORDER — ANASTROZOLE 1 MG/1
1 TABLET ORAL DAILY
Qty: 90 TABLET | Refills: 3 | Status: SHIPPED | OUTPATIENT
Start: 2023-04-18

## 2023-04-18 NOTE — PROGRESS NOTES
Education Record    Learner:  Patient    Disease / [address] breast cancer  Annual FU    Barriers / Limitations:  None   Comments:    Method:  Discussion   Comments:    General Topics:  Plan of care reviewed   Comments:    Outcome:  Shows understanding   Comments:  Taking anastrozole. Right breast imaging up to date, next due in May. Reports some left shoulder pain, for last month or so has gotten worse with time. Has taken tylenol for the pain. Contacted PCP, no appt until June   And pain in left thumb area.

## 2023-05-03 RX ORDER — IRBESARTAN 300 MG/1
300 TABLET ORAL NIGHTLY
Qty: 7 TABLET | Refills: 0 | Status: SHIPPED | OUTPATIENT
Start: 2023-05-03

## 2023-05-03 RX ORDER — ATORVASTATIN CALCIUM 20 MG/1
20 TABLET, FILM COATED ORAL DAILY
Qty: 7 TABLET | Refills: 0 | Status: SHIPPED | OUTPATIENT
Start: 2023-05-03

## 2023-05-03 NOTE — TELEPHONE ENCOUNTER
I will only authorize 7 tablets. She was due for labs in January and office visit with me in august of 2022. she has not been following up at recommended.

## 2023-05-09 ENCOUNTER — LAB ENCOUNTER (OUTPATIENT)
Dept: LAB | Age: 73
End: 2023-05-09
Attending: INTERNAL MEDICINE
Payer: MEDICARE

## 2023-05-09 ENCOUNTER — OFFICE VISIT (OUTPATIENT)
Dept: INTERNAL MEDICINE CLINIC | Facility: CLINIC | Age: 73
End: 2023-05-09
Payer: MEDICARE

## 2023-05-09 VITALS
WEIGHT: 167 LBS | SYSTOLIC BLOOD PRESSURE: 134 MMHG | TEMPERATURE: 98 F | RESPIRATION RATE: 16 BRPM | DIASTOLIC BLOOD PRESSURE: 68 MMHG | BODY MASS INDEX: 32.79 KG/M2 | HEIGHT: 60 IN | HEART RATE: 73 BPM | OXYGEN SATURATION: 98 %

## 2023-05-09 DIAGNOSIS — E78.5 HYPERLIPIDEMIA ASSOCIATED WITH TYPE 2 DIABETES MELLITUS (HCC): ICD-10-CM

## 2023-05-09 DIAGNOSIS — E11.69 HYPERLIPIDEMIA ASSOCIATED WITH TYPE 2 DIABETES MELLITUS (HCC): ICD-10-CM

## 2023-05-09 DIAGNOSIS — I15.2 HYPERTENSION ASSOCIATED WITH DIABETES (HCC): ICD-10-CM

## 2023-05-09 DIAGNOSIS — M75.02 ADHESIVE CAPSULITIS OF LEFT SHOULDER: Primary | ICD-10-CM

## 2023-05-09 DIAGNOSIS — E11.9 TYPE 2 DIABETES MELLITUS WITHOUT COMPLICATION, WITHOUT LONG-TERM CURRENT USE OF INSULIN (HCC): ICD-10-CM

## 2023-05-09 DIAGNOSIS — E11.59 HYPERTENSION ASSOCIATED WITH DIABETES (HCC): ICD-10-CM

## 2023-05-09 PROCEDURE — 3075F SYST BP GE 130 - 139MM HG: CPT | Performed by: PHYSICIAN ASSISTANT

## 2023-05-09 PROCEDURE — 82043 UR ALBUMIN QUANTITATIVE: CPT | Performed by: INTERNAL MEDICINE

## 2023-05-09 PROCEDURE — 1160F RVW MEDS BY RX/DR IN RCRD: CPT | Performed by: PHYSICIAN ASSISTANT

## 2023-05-09 PROCEDURE — 3061F NEG MICROALBUMINURIA REV: CPT | Performed by: INTERNAL MEDICINE

## 2023-05-09 PROCEDURE — 80061 LIPID PANEL: CPT | Performed by: INTERNAL MEDICINE

## 2023-05-09 PROCEDURE — 84450 TRANSFERASE (AST) (SGOT): CPT | Performed by: INTERNAL MEDICINE

## 2023-05-09 PROCEDURE — 83036 HEMOGLOBIN GLYCOSYLATED A1C: CPT | Performed by: INTERNAL MEDICINE

## 2023-05-09 PROCEDURE — 3008F BODY MASS INDEX DOCD: CPT | Performed by: PHYSICIAN ASSISTANT

## 2023-05-09 PROCEDURE — 36415 COLL VENOUS BLD VENIPUNCTURE: CPT | Performed by: INTERNAL MEDICINE

## 2023-05-09 PROCEDURE — 99214 OFFICE O/P EST MOD 30 MIN: CPT | Performed by: PHYSICIAN ASSISTANT

## 2023-05-09 PROCEDURE — 84460 ALANINE AMINO (ALT) (SGPT): CPT | Performed by: INTERNAL MEDICINE

## 2023-05-09 PROCEDURE — 3078F DIAST BP <80 MM HG: CPT | Performed by: PHYSICIAN ASSISTANT

## 2023-05-09 PROCEDURE — 82570 ASSAY OF URINE CREATININE: CPT | Performed by: INTERNAL MEDICINE

## 2023-05-09 PROCEDURE — 1159F MED LIST DOCD IN RCRD: CPT | Performed by: PHYSICIAN ASSISTANT

## 2023-05-09 PROCEDURE — 3044F HG A1C LEVEL LT 7.0%: CPT | Performed by: INTERNAL MEDICINE

## 2023-05-09 PROCEDURE — 80048 BASIC METABOLIC PNL TOTAL CA: CPT | Performed by: INTERNAL MEDICINE

## 2023-05-09 NOTE — PATIENT INSTRUCTIONS
Blood work today. Schedule an appointment with orthopedics (Dr. Crissy Medina or you can see one of his partners or PAs). Follow up in the next 1-2 weeks for your Medicare visit and follow up of diabetes.

## 2023-05-18 ENCOUNTER — TELEPHONE (OUTPATIENT)
Dept: ORTHOPEDICS CLINIC | Facility: CLINIC | Age: 73
End: 2023-05-18

## 2023-05-18 RX ORDER — ATORVASTATIN CALCIUM 20 MG/1
TABLET, FILM COATED ORAL
Qty: 14 TABLET | Refills: 0 | Status: SHIPPED | OUTPATIENT
Start: 2023-05-18

## 2023-05-18 NOTE — TELEPHONE ENCOUNTER
Patient calling back stating she is being seen for the LT SHOULDER PAIN. Please advise if Xray is needed.

## 2023-05-18 NOTE — TELEPHONE ENCOUNTER
Called the patient to check which body part are we seeing her for tomorrow w/ Dr Dario Guzmán. There is no order and xray found in Epic. Kindly check with her if she calls back so we can order the necessary xray for her if needed. Thank you!

## 2023-05-19 ENCOUNTER — HOSPITAL ENCOUNTER (OUTPATIENT)
Dept: GENERAL RADIOLOGY | Age: 73
Discharge: HOME | End: 2023-05-19
Attending: ORTHOPAEDIC SURGERY
Payer: MEDICARE

## 2023-05-19 ENCOUNTER — OFFICE VISIT (OUTPATIENT)
Dept: ORTHOPEDICS CLINIC | Facility: CLINIC | Age: 73
End: 2023-05-19
Payer: MEDICARE

## 2023-05-19 VITALS — HEIGHT: 60 IN | WEIGHT: 167 LBS | BODY MASS INDEX: 32.79 KG/M2

## 2023-05-19 DIAGNOSIS — M25.512 LEFT SHOULDER PAIN, UNSPECIFIED CHRONICITY: ICD-10-CM

## 2023-05-19 DIAGNOSIS — M75.02 ADHESIVE CAPSULITIS OF LEFT SHOULDER: Primary | ICD-10-CM

## 2023-05-19 PROCEDURE — 1125F AMNT PAIN NOTED PAIN PRSNT: CPT | Performed by: ORTHOPAEDIC SURGERY

## 2023-05-19 PROCEDURE — 1159F MED LIST DOCD IN RCRD: CPT | Performed by: ORTHOPAEDIC SURGERY

## 2023-05-19 PROCEDURE — 3008F BODY MASS INDEX DOCD: CPT | Performed by: ORTHOPAEDIC SURGERY

## 2023-05-19 PROCEDURE — 73030 X-RAY EXAM OF SHOULDER: CPT | Performed by: ORTHOPAEDIC SURGERY

## 2023-05-19 PROCEDURE — 1160F RVW MEDS BY RX/DR IN RCRD: CPT | Performed by: ORTHOPAEDIC SURGERY

## 2023-05-19 PROCEDURE — 99204 OFFICE O/P NEW MOD 45 MIN: CPT | Performed by: ORTHOPAEDIC SURGERY

## 2023-05-19 RX ORDER — MELOXICAM 15 MG/1
15 TABLET ORAL DAILY
Qty: 14 TABLET | Refills: 1 | Status: SHIPPED | OUTPATIENT
Start: 2023-05-19

## 2023-05-19 RX ORDER — OMEPRAZOLE 40 MG/1
40 CAPSULE, DELAYED RELEASE ORAL DAILY
Qty: 30 CAPSULE | Refills: 1 | Status: SHIPPED | OUTPATIENT
Start: 2023-05-19 | End: 2023-06-18

## 2023-05-23 ENCOUNTER — TELEPHONE (OUTPATIENT)
Dept: INTERNAL MEDICINE CLINIC | Facility: CLINIC | Age: 73
End: 2023-05-23

## 2023-05-23 NOTE — TELEPHONE ENCOUNTER
KS - would you like to see pt sooner than 6/7/23? Medication changes? Please advise, ty! Spoke to pt, she reports that she takes her BP medication at night. Blood pressures have been running high, rarely below 217 systolic. Pt is having headaches, takes Tylenol that helps about 50% of the time. Pt also reports occasional dizziness. RN informed pt that we will call her back tomorrow with Dr. Reynaldo Sorensen recommendations. Pt v/u.

## 2023-05-23 NOTE — TELEPHONE ENCOUNTER
Pt called stating her BP has not been stable, this morning it was 89/180 and then later in the day it was 164/89. She states everyday she checks her BP and it is constantly raising or going down. Pt has an appt scheduled for her MAS, she would like to know if she should come in sooner to discuss her BP or if KS would like to adjust BP medication before appt.     Future Appointments   Date Time Provider [address]   6/7/2023  9:00 AM Thanh Pedro MD [facility] [place]

## 2023-05-24 RX ORDER — AMLODIPINE BESYLATE 2.5 MG/1
2.5 TABLET ORAL EVERY EVENING
Qty: 90 TABLET | Refills: 0 | Status: SHIPPED | OUTPATIENT
Start: 2023-05-24

## 2023-05-24 NOTE — TELEPHONE ENCOUNTER
Spoke to pt, provided Dr. Gavin Hay recommendations and instructions below. Pt will keep a blood pressure log after starting the amlodipine and bring log to her appt, with her blood pressure machine, on 6/7/23. RN instructed pt to call the office if she experiences any ankle swelling. Pt v/u.

## 2023-05-24 NOTE — TELEPHONE ENCOUNTER
Continue irbesartan 300 mg every evening and ADD amlodipine 2.5 mg every evening (watch for ankle swelling as a common side effect). Can keep office visit for 6/7. She should come 10 minutes early to appt and bring in her home machine.

## 2023-06-06 ENCOUNTER — TELEPHONE (OUTPATIENT)
Dept: PHYSICAL THERAPY | Facility: HOSPITAL | Age: 73
End: 2023-06-06

## 2023-06-07 ENCOUNTER — OFFICE VISIT (OUTPATIENT)
Dept: INTERNAL MEDICINE CLINIC | Facility: CLINIC | Age: 73
End: 2023-06-07
Payer: MEDICARE

## 2023-06-07 VITALS
RESPIRATION RATE: 16 BRPM | TEMPERATURE: 98 F | WEIGHT: 163.81 LBS | HEART RATE: 60 BPM | OXYGEN SATURATION: 100 % | HEIGHT: 61.63 IN | SYSTOLIC BLOOD PRESSURE: 140 MMHG | BODY MASS INDEX: 30.14 KG/M2 | DIASTOLIC BLOOD PRESSURE: 80 MMHG

## 2023-06-07 DIAGNOSIS — E11.59 HYPERTENSION ASSOCIATED WITH DIABETES (HCC): ICD-10-CM

## 2023-06-07 DIAGNOSIS — E11.69 HYPERLIPIDEMIA ASSOCIATED WITH TYPE 2 DIABETES MELLITUS (HCC): ICD-10-CM

## 2023-06-07 DIAGNOSIS — Z12.11 SCREEN FOR COLON CANCER: ICD-10-CM

## 2023-06-07 DIAGNOSIS — Z00.00 ROUTINE GENERAL MEDICAL EXAMINATION AT A HEALTH CARE FACILITY: Primary | ICD-10-CM

## 2023-06-07 DIAGNOSIS — E78.5 HYPERLIPIDEMIA ASSOCIATED WITH TYPE 2 DIABETES MELLITUS (HCC): ICD-10-CM

## 2023-06-07 DIAGNOSIS — M17.10 PRIMARY LOCALIZED OSTEOARTHRITIS OF KNEE: ICD-10-CM

## 2023-06-07 DIAGNOSIS — C50.912 INVASIVE DUCTAL CARCINOMA OF BREAST, FEMALE, LEFT (HCC): ICD-10-CM

## 2023-06-07 DIAGNOSIS — M25.561 CHRONIC PAIN OF BOTH KNEES: ICD-10-CM

## 2023-06-07 DIAGNOSIS — M25.562 CHRONIC PAIN OF BOTH KNEES: ICD-10-CM

## 2023-06-07 DIAGNOSIS — E11.9 TYPE 2 DIABETES MELLITUS WITHOUT COMPLICATION, WITHOUT LONG-TERM CURRENT USE OF INSULIN (HCC): ICD-10-CM

## 2023-06-07 DIAGNOSIS — E66.9 DIABETES MELLITUS TYPE 2 IN OBESE (HCC): ICD-10-CM

## 2023-06-07 DIAGNOSIS — I15.2 HYPERTENSION ASSOCIATED WITH DIABETES (HCC): ICD-10-CM

## 2023-06-07 DIAGNOSIS — G89.29 CHRONIC PAIN OF BOTH KNEES: ICD-10-CM

## 2023-06-07 DIAGNOSIS — E11.69 DIABETES MELLITUS TYPE 2 IN OBESE (HCC): ICD-10-CM

## 2023-06-07 PROCEDURE — 3077F SYST BP >= 140 MM HG: CPT | Performed by: INTERNAL MEDICINE

## 2023-06-07 PROCEDURE — 3079F DIAST BP 80-89 MM HG: CPT | Performed by: INTERNAL MEDICINE

## 2023-06-07 PROCEDURE — G0439 PPPS, SUBSEQ VISIT: HCPCS | Performed by: INTERNAL MEDICINE

## 2023-06-07 PROCEDURE — 1159F MED LIST DOCD IN RCRD: CPT | Performed by: INTERNAL MEDICINE

## 2023-06-07 PROCEDURE — 96160 PT-FOCUSED HLTH RISK ASSMT: CPT | Performed by: INTERNAL MEDICINE

## 2023-06-07 PROCEDURE — 1125F AMNT PAIN NOTED PAIN PRSNT: CPT | Performed by: INTERNAL MEDICINE

## 2023-06-07 PROCEDURE — 1170F FXNL STATUS ASSESSED: CPT | Performed by: INTERNAL MEDICINE

## 2023-06-07 PROCEDURE — 3008F BODY MASS INDEX DOCD: CPT | Performed by: INTERNAL MEDICINE

## 2023-06-07 PROCEDURE — 1160F RVW MEDS BY RX/DR IN RCRD: CPT | Performed by: INTERNAL MEDICINE

## 2023-06-07 PROCEDURE — 99214 OFFICE O/P EST MOD 30 MIN: CPT | Performed by: INTERNAL MEDICINE

## 2023-06-07 RX ORDER — FAMOTIDINE 20 MG/1
20 TABLET, FILM COATED ORAL 2 TIMES DAILY PRN
COMMUNITY

## 2023-06-07 NOTE — PATIENT INSTRUCTIONS
You are overdue for your bone density (DEXA) and mammogram.  Please schedule your test by utilizing one of the following options:   Log into your [employer] account to schedule an appointment   Schedule online by accessing [employer]   Call Central Scheduling to schedule an appointment at [phone]    Please complete your diabetic eye exam as soon as possible. You must use omeprazole when you are taking meloxicam. You do not need to take famotidine when you take tylenol. You are due for your labs again in November, 2023. You do not need to fast. Please see me 1 week later. I do advise you get a COVID vaccine as soon as possible. I also advise you get the shingrix vaccine once in a lifetime. It is a two step vaccine given 2-6 months apart. I advise you get the annual flu shot every September, October.

## 2023-06-08 ENCOUNTER — OFFICE VISIT (OUTPATIENT)
Dept: PHYSICAL THERAPY | Age: 73
End: 2023-06-08
Attending: ORTHOPAEDIC SURGERY
Payer: MEDICARE

## 2023-06-08 DIAGNOSIS — M75.02 ADHESIVE CAPSULITIS OF LEFT SHOULDER: ICD-10-CM

## 2023-06-08 PROCEDURE — 97110 THERAPEUTIC EXERCISES: CPT

## 2023-06-08 PROCEDURE — 97162 PT EVAL MOD COMPLEX 30 MIN: CPT

## 2023-06-09 RX ORDER — ISOPROPYL ALCOHOL 0.75 G/1
SWAB TOPICAL
Qty: 100 EACH | Refills: 2 | Status: SHIPPED | OUTPATIENT
Start: 2023-06-09

## 2023-06-09 RX ORDER — CALCIUM CITRATE/VITAMIN D3 200MG-6.25
TABLET ORAL
Qty: 100 STRIP | Refills: 0 | Status: SHIPPED | OUTPATIENT
Start: 2023-06-09

## 2023-06-13 ENCOUNTER — OFFICE VISIT (OUTPATIENT)
Dept: PHYSICAL THERAPY | Age: 73
End: 2023-06-13
Attending: ORTHOPAEDIC SURGERY
Payer: MEDICARE

## 2023-06-13 PROCEDURE — 97110 THERAPEUTIC EXERCISES: CPT

## 2023-06-13 PROCEDURE — 97140 MANUAL THERAPY 1/> REGIONS: CPT

## 2023-06-16 ENCOUNTER — APPOINTMENT (OUTPATIENT)
Dept: PHYSICAL THERAPY | Age: 73
End: 2023-06-16
Attending: ORTHOPAEDIC SURGERY
Payer: MEDICARE

## 2023-06-16 ENCOUNTER — TELEPHONE (OUTPATIENT)
Dept: PHYSICAL THERAPY | Age: 73
End: 2023-06-16

## 2023-06-19 RX ORDER — CALCIUM CITRATE/VITAMIN D3 200MG-6.25
TABLET ORAL
Qty: 100 STRIP | Refills: 0 | Status: SHIPPED | OUTPATIENT
Start: 2023-06-19

## 2023-06-19 RX ORDER — ISOPROPYL ALCOHOL 0.75 G/1
SWAB TOPICAL
Qty: 100 EACH | Refills: 2 | Status: SHIPPED | OUTPATIENT
Start: 2023-06-19

## 2023-06-20 ENCOUNTER — OFFICE VISIT (OUTPATIENT)
Dept: PHYSICAL THERAPY | Age: 73
End: 2023-06-20
Attending: ORTHOPAEDIC SURGERY
Payer: MEDICARE

## 2023-06-20 PROCEDURE — 97110 THERAPEUTIC EXERCISES: CPT

## 2023-06-20 PROCEDURE — 97140 MANUAL THERAPY 1/> REGIONS: CPT

## 2023-06-26 ENCOUNTER — OFFICE VISIT (OUTPATIENT)
Dept: PHYSICAL THERAPY | Age: 73
End: 2023-06-26
Attending: ORTHOPAEDIC SURGERY
Payer: MEDICARE

## 2023-06-26 PROCEDURE — 97140 MANUAL THERAPY 1/> REGIONS: CPT

## 2023-06-26 PROCEDURE — 97110 THERAPEUTIC EXERCISES: CPT

## 2023-06-26 NOTE — PROGRESS NOTES
Diagnosis:   L shoulder adhesive capsulitis      Referring Provider: Ronny  Date of Evaluation:   6/8/2023    Precautions:  Drug Allergy, Cancer Next MD visit:   none scheduled  Date of Surgery: n/a   Insurance Primary/Secondary: [facility] [place] / N/A       # Auth Visits: 8   Total Timed Treatment: 45 min  Date POC Expires: 8/8/2023   Total Treatment time: 45 min       Charges: EX 2, MT 1     Treatment Number: 4    Subjective: Increased pain L upper arm over the weekend. Pain and tightness L shoulder, pain with reaching, lifting and turning movements using the L arm. Pain L shoulder when taking care of my . Both of my knees are really bad I have pain in standing and walking. Pain: 7/10   Objective:  AROM: (* denotes performed with pain)  Shoulder    Flexion: R 145*; L 120*  Abduction: R 145*; L 85*  Scaption: R 145, L 110*  ER: R 60; L 50*  IR: R 60; L 30*     Cervical Screen: Rotation L 70 deg, R 60 deg, flexion 30 deg, ext 20 deg    Treatment:  Supine GH joint mobilizations L post glide Gd 3 in neutral 2 x 30 secs                                                      L inf glide in abduction Gd 2 2 x 30 secs                PROM L shoulder flexion, abduction, ER, IR 5 mins                PROM cervical spine side flex R/L x 3, rotation R/L x 3                Passive sh depression stretch 3 x 10 sec hold                Holding sh cane - sh press x 10, L sh ER/IR x 10, overhead sh flex/ER x 10      Seated: Sh shrugs x 10               Scapular squeezes x 10               Cervical spine ROM, retraction, rotation x 10               Red t band narrow rows x 20               Biceps curls with 2# x 20  Seated STM upper trapezius and periscapular muscles 15 mins                    Goals:   (to be met in 8 visits)   1. Increase L shoulder flexion to 140 deg to enable pt to raise L arm overhead for self care and basic daily tasks  2.  Increase L shoulder ER to 50 deg to enable pt to reach sideways whilst preparing a meal  3. Decrease tenderness to palpation to enable pt to lay on her L side to sleep for at least 2 hours  4. Increase L shoulder strength to 4/5 to enable her to manage daily tasks, housework, cooking and care for her       HEP: Shoulder ROM standing and supine, scap squeezes, L sh IR/ER, biceps curls, cervical spine ROM  Education:Use of heat or ice R shoulder for pain, importance of staying active. Correct posture in sitting and standing to improve shoulder AROM. Assessment: 68year old female with chronic L shoulder pain, recent diagnosis of adhesive capsulitis. Pain with active and passive motion L shoulder. Pt needs frequent reminders to correct posture. Precautions:Breast cancer with mastectomy, HTN, OA knee, LBP,  Diabetes    Plan:Continue PT 2 x per week for L shoulder PROM, AAROM using cane, STM upper trap and upper arm, ROM cervical spine, basic scapular muscle strengthening and HEP instruction.

## 2023-06-28 ENCOUNTER — APPOINTMENT (OUTPATIENT)
Dept: PHYSICAL THERAPY | Age: 73
End: 2023-06-28
Attending: ORTHOPAEDIC SURGERY
Payer: MEDICARE

## 2023-07-05 ENCOUNTER — HOSPITAL ENCOUNTER (OUTPATIENT)
Dept: BONE DENSITY | Age: 73
Discharge: HOME | End: 2023-07-05
Attending: INTERNAL MEDICINE
Payer: MEDICARE

## 2023-07-05 DIAGNOSIS — Z78.0 POSTMENOPAUSAL: ICD-10-CM

## 2023-07-05 PROCEDURE — 77080 DXA BONE DENSITY AXIAL: CPT | Performed by: INTERNAL MEDICINE

## 2023-07-05 NOTE — PROGRESS NOTES
Dx: R foot pain, Bilateral knee OA, R elbow pain         Authorized # of Visits:  8         Next MD visit: none scheduled  Fall Risk: standard         Precautions: n/a             Subjective: Bilateral knee pain with walking, prolonged sitting and stair cl massage using foot roller 2 mins Seated green t band R ankle PF, DF, IV, EV x 20 each Seated blue t band R ankle PF, DF, IV, EV x 15 each      Supine hamstring stretch with passive ankle DF R x 10 Supine heel slides R/L x 10 each Seated plantar fascia mass 6 mins lateral aspect right elbow      STM lateral aspect R elbow,forearm extensor muscles 5 mins  US 3 MHz, pulsed 50% 0.8 Wcm2 7 mins R lateral elbow         Skilled Services: STM, manual stretching, instruction in LE strengthening    Charges:  EX 2, MT Defer Treatment (Provide Reason For Deferment In Text Field Below): ibuprofen 10 day course, pt wants to discuss with spouse Render In Strict Bullet Format?: No Initiate Treatment: CeraVe anti-itch cream Detail Level: Zone

## 2023-07-06 ENCOUNTER — HOSPITAL ENCOUNTER (OUTPATIENT)
Dept: MAMMOGRAPHY | Facility: HOSPITAL | Age: 73
Discharge: HOME | End: 2023-07-06
Attending: INTERNAL MEDICINE
Payer: MEDICARE

## 2023-07-06 DIAGNOSIS — C50.912 INVASIVE DUCTAL CARCINOMA OF BREAST, FEMALE, LEFT (HCC): ICD-10-CM

## 2023-07-06 PROCEDURE — 77065 DX MAMMO INCL CAD UNI: CPT | Performed by: INTERNAL MEDICINE

## 2023-07-06 PROCEDURE — 77061 BREAST TOMOSYNTHESIS UNI: CPT | Performed by: INTERNAL MEDICINE

## 2023-07-10 ENCOUNTER — OFFICE VISIT (OUTPATIENT)
Dept: PHYSICAL THERAPY | Age: 73
End: 2023-07-10
Attending: ORTHOPAEDIC SURGERY
Payer: MEDICARE

## 2023-07-10 PROCEDURE — 97140 MANUAL THERAPY 1/> REGIONS: CPT

## 2023-07-10 PROCEDURE — 97110 THERAPEUTIC EXERCISES: CPT

## 2023-07-10 NOTE — PROGRESS NOTES
Diagnosis:   L shoulder adhesive capsulitis      Referring Provider: Ronny  Date of Evaluation:   6/8/2023    Precautions:  Drug Allergy, Cancer Next MD visit:   none scheduled  Date of Surgery: n/a   Insurance Primary/Secondary: [facility] / N/A       # Auth Visits: 8   Total Timed Treatment: 45 min  Date POC Expires: 8/8/2023   Total Treatment time: 45 min       Charges: EX 2, MT 1     Treatment Number: 6    Subjective: Increased pain L upper arm over the weekend. Pain and tightness L shoulder, pain with reaching, lifting and turning movements using the L arm. Pain L shoulder when taking care of my . Both of my knees are really bad I have pain in standing and walking. Pain: 7/10   Objective:  AROM: (* denotes performed with pain)  Shoulder    Flexion: R 145*; L 130*  Abduction: R 145*; L 90*  Scaption: R 145, L 110*  ER: R 60; L 50*  IR: R 60; L 30*     Cervical Screen: Rotation L 70 deg, R 60 deg, flexion 30 deg, ext 20 deg    Treatment:  Supine GH joint mobilizations L post glide Gd 3 in neutral 2 x 30 secs                                                      L inf glide in abduction Gd 2 2 x 30 secs                PROM L shoulder flexion, abduction, ER, IR 5 mins                PROM cervical spine side flex R/L x 3, rotation R/L x 3                Passive sh depression stretch 3 x 10 sec hold                Holding sh cane - sh press x 10, L sh ER/IR x 10, overhead sh flex/ER x 10      Seated: Sh shrugs x 10               Scapular squeezes x 10               Cervical spine ROM, retraction, rotation x 10               Red t band narrow rows x 20               Biceps curls with 2# x 20  Seated STM upper trapezius and periscapular muscles 15 mins                    Goals:   (to be met in 8 visits)   1. Increase L shoulder flexion to 140 deg to enable pt to raise L arm overhead for self care and basic daily tasks  2. Increase L shoulder ER to 50 deg to enable pt to reach sideways whilst preparing a meal  3. Decrease tenderness to palpation to enable pt to lay on her L side to sleep for at least 2 hours  4. Increase L shoulder strength to 4/5 to enable her to manage daily tasks, housework, cooking and care for her       HEP: Shoulder ROM standing and supine, scap squeezes, L sh IR/ER, biceps curls, cervical spine ROM  Education:Use of heat or ice R shoulder for pain, importance of staying active. Correct posture in sitting and standing to improve shoulder AROM. Assessment: 68year old female with chronic L shoulder pain, recent diagnosis of adhesive capsulitis. Pain with active and passive motion L shoulder. Pt needs frequent reminders to correct posture. Precautions:Breast cancer with mastectomy, HTN, OA knee, LBP,  Diabetes    Plan:Continue PT 2 x per week for L shoulder PROM, AAROM using cane, STM upper trap and upper arm, ROM cervical spine, basic scapular muscle strengthening and HEP instruction.

## 2023-07-12 ENCOUNTER — OFFICE VISIT (OUTPATIENT)
Dept: PHYSICAL THERAPY | Age: 73
End: 2023-07-12
Attending: ORTHOPAEDIC SURGERY
Payer: MEDICARE

## 2023-07-12 PROCEDURE — 97140 MANUAL THERAPY 1/> REGIONS: CPT

## 2023-07-12 PROCEDURE — 97110 THERAPEUTIC EXERCISES: CPT

## 2023-07-12 NOTE — PROGRESS NOTES
Diagnosis:   L shoulder adhesive capsulitis      Referring Provider: Ronny  Date of Evaluation:   6/8/2023    Precautions:  Drug Allergy, Cancer Next MD visit:   none scheduled  Date of Surgery: n/a   Insurance Primary/Secondary: [facility] / N/A       # Auth Visits: 8   Total Timed Treatment: 45 min  Date POC Expires: 8/8/2023   Total Treatment time: 45 min       Charges: EX 2, MT 1     Treatment Number: 6    Subjective: L upper arm, shoulder is a little less painful today. Pain both knees and lower back. Pain L shoulder when taking care of my . Both of my knees are really bad I have pain in standing and walking. Pain: 6/10   Objective:  AROM: (* denotes performed with pain)  Shoulder    Flexion: R 145*; L 130*  Abduction: R 145*; L 90*  Scaption: R 145, L 110*  ER: R 60; L 50*  IR: R 60; L 30*     Cervical Screen: Rotation L 70 deg, R 60 deg, flexion 30 deg, ext 20 deg    Treatment:  Supine GH joint mobilizations L post glide Gd 3 in neutral 2 x 30 secs                                                      L inf glide in abduction Gd 2 2 x 30 secs                PROM L shoulder flexion, abduction, ER, IR 5 mins                PROM cervical spine side flex R/L x 3, rotation R/L x 3                Passive sh depression stretch 3 x 10 sec hold                Holding sh cane - sh press x 10, L sh ER/IR x 10, overhead sh flex/ER x 10      Seated: Sh shrugs x 10               Scapular squeezes x 10               Cervical spine ROM, retraction, rotation x 10                t band narrow rows x 20               Biceps curls with 2# x 20  Seated STM upper trapezius and periscapular muscles 15 mins                    Goals:   (to be met in 8 visits)   1. Increase L shoulder flexion to 140 deg to enable pt to raise L arm overhead for self care and basic daily tasks  2. Increase L shoulder ER to 50 deg to enable pt to reach sideways whilst preparing a meal  3.  Decrease tenderness to palpation to enable pt to lay on her L side to sleep for at least 2 hours  4. Increase L shoulder strength to 4/5 to enable her to manage daily tasks, housework, cooking and care for her       HEP: Shoulder ROM standing and supine, scap squeezes, L sh IR/ER, biceps curls, cervical spine ROM  Education:Use of heat or ice R shoulder for pain, importance of staying active. Correct posture in sitting and standing to improve shoulder AROM. Assessment: 68year old female with chronic L shoulder pain, recent diagnosis of adhesive capsulitis. Pain with active and passive motion L shoulder. Pt needs frequent reminders to correct posture. Precautions:Breast cancer with mastectomy, HTN, OA knee, LBP,  Diabetes    Plan:Continue PT x 2 for L shoulder PROM, AAROM using cane, STM upper trap and upper arm, ROM cervical spine, basic scapular muscle strengthening and HEP instruction.

## 2023-07-13 RX ORDER — IRBESARTAN 300 MG/1
300 TABLET ORAL EVERY EVENING
Qty: 90 TABLET | Refills: 1 | Status: SHIPPED | OUTPATIENT
Start: 2023-07-13

## 2023-07-13 NOTE — TELEPHONE ENCOUNTER
IRBESARTAN 300 MG Tablet          Will file in chart as: IRBESARTAN 300 MG Oral Tab    Sig: TAKE 1 TABLET (300 MG TOTAL) BY MOUTH NIGHTLY (LAST REFILL, DUE FOR OFFICE VISIT AS SOON AS POSSIBLE)    Disp: 90 tablet    Refills: 0 (Pharmacy requested: Not specified)    Start: 7/13/2023    Class: Normal    Non-formulary    Last ordered: 2 months ago by Devon Baker MD Last refill: 4/29/2023    Rx #: [id number]    Hypertension Medications Protocol [id number]/2023 12:09 PM   Protocol Details CMP or BMP in past 12 months    Last serum creatinine< 2.0    Appointment in past 6 or next 3 months      LOV 6/7/23  RTC 6 months   Filled 5/3/23 7 tabs 0 refills   Last labs 5/9/23  Future Appointments   Date Time Provider Moisés Dorado   7/21/2023  9:30 AM Verick Cardenas PT [facility] [place]   8/3/2023 12:00 PM Verick Cardenas PT [facility] [place]   8/9/2023 10:15 AM Verick Cardenas PT [facility] [place]

## 2023-07-21 ENCOUNTER — OFFICE VISIT (OUTPATIENT)
Dept: PHYSICAL THERAPY | Age: 73
End: 2023-07-21
Attending: ORTHOPAEDIC SURGERY
Payer: MEDICARE

## 2023-07-21 PROCEDURE — 97140 MANUAL THERAPY 1/> REGIONS: CPT

## 2023-07-21 PROCEDURE — 97110 THERAPEUTIC EXERCISES: CPT

## 2023-07-21 NOTE — PROGRESS NOTES
Diagnosis:   L shoulder adhesive capsulitis      Referring Provider: Ronny  Date of Evaluation:   6/8/2023    Precautions:  Drug Allergy, Cancer Next MD visit:   none scheduled  Date of Surgery: n/a   Insurance Primary/Secondary: [facility] / N/A       # Auth Visits: 8   Total Timed Treatment: 45 min  Date POC Expires: 8/8/2023   Total Treatment time: 45 min       Charges: EX 2, MT 1     Treatment Number: 7    Subjective: L upper arm, shoulder is a little less painful today. Pain both knees and lower back. Pain L shoulder when taking care of my . Both of my knees are really bad I have pain in standing and walking. After therapy for the shoulder is completed I would like to start therapy for the knees. Pain: 5/10   Objective:  AROM: (* denotes performed with pain)  Shoulder    Flexion: R 145*; L 130*  Abduction: R 145*; L 100*  Scaption: R 145, L 120*  ER: R 60; L 50*  IR: R 60; L 30*     Cervical Screen: Rotation L 70 deg, R 60 deg, flexion 30 deg, ext 20 deg    Treatment:  Supine GH joint mobilizations L post glide Gd 3 in neutral 2 x 30 secs                                                      L inf glide in abduction Gd 2 2 x 30 secs                PROM L shoulder flexion, abduction, ER, IR 5 mins                Triceps ext with L shoulder @ 90 deg flex x 10                PROM cervical spine side flex R/L x 3, rotation R/L x 3                Passive sh depression stretch 3 x 10 sec hold                Holding sh cane - sh press x 10, L sh ER/IR x 10, sh add/abd x 10  Standing L sh flexion wall slide x 10      Seated: Cervical spine ROM, retraction, rotation x 10                t band narrow rows x 20               Biceps curls with 2# x 20  Seated STM upper trapezius and periscapular muscles 15 mins  Supine with MHP L shoulder 10 mins                    Goals:   (to be met in 8 visits)   1. Increase L shoulder flexion to 140 deg to enable pt to raise L arm overhead for self care and basic daily tasks  2. Increase L shoulder ER to 50 deg to enable pt to reach sideways whilst preparing a meal  3. Decrease tenderness to palpation to enable pt to lay on her L side to sleep for at least 2 hours  4. Increase L shoulder strength to 4/5 to enable her to manage daily tasks, housework, cooking and care for her       HEP: Shoulder ROM standing and supine, scap squeezes, L sh IR/ER, biceps curls, cervical spine ROM  Education:Use of heat or ice R shoulder for pain, importance of staying active. Correct posture in sitting and standing to improve shoulder AROM. Assessment: 68year old female with chronic L shoulder pain, recent diagnosis of adhesive capsulitis. ROM is improving, L sh abd, ER remain painful. Pt needs frequent reminders to correct posture. Precautions:Breast cancer with mastectomy, HTN, OA knee, LBP,  Diabetes    Plan:Continue PT for L shoulder PROM, AAROM using cane, STM upper trap and upper arm, ROM cervical spine, basic scapular muscle strengthening and HEP instruction. 1 visit remaining on referral, plan to re-assess ROM, HEP and plan discharge.

## 2023-08-03 ENCOUNTER — OFFICE VISIT (OUTPATIENT)
Dept: PHYSICAL THERAPY | Age: 73
End: 2023-08-03
Attending: ORTHOPAEDIC SURGERY
Payer: MEDICARE

## 2023-08-03 PROCEDURE — 97140 MANUAL THERAPY 1/> REGIONS: CPT

## 2023-08-03 PROCEDURE — 97110 THERAPEUTIC EXERCISES: CPT

## 2023-08-03 NOTE — PROGRESS NOTES
Diagnosis:   L shoulder adhesive capsulitis      Referring Provider: Ronny  Date of Evaluation:   6/8/2023    Precautions:  Drug Allergy, Cancer Next MD visit:   none scheduled  Date of Surgery: n/a   Insurance Primary/Secondary: [facility] / N/A       # Auth Visits: 8   Total Timed Treatment: 45 min  Date POC Expires: 8/8/2023   Total Treatment time: 45 min       Charges: EX 2, MT 1     Treatment Number: 8  Discharge Note:  Subjective: L upper arm, shoulder is a little less painful than at the start of therapy. Pain L shoulder when taking care of my . Pain: 2-4/10   Objective:  AROM: (* denotes performed with pain)  Shoulder    Flexion: R 145*; L 140*  Abduction: R 145*; L 100*  Scaption: R 145, L 120*  ER: R 60; L 50*  IR: R 60; L 30*     Cervical Screen: Rotation L 70 deg, R 60 deg, flexion 30 deg, ext 20 deg    Treatment:  Supine GH joint mobilizations L post glide Gd 3 in neutral 2 x 30 secs                                                      L inf glide in abduction Gd 2 2 x 30 secs                PROM L shoulder flexion, abduction, ER, IR 5 mins                Triceps ext with L shoulder @ 90 deg flex x 10                PROM cervical spine side flex R/L x 3, rotation R/L x 3                Passive sh depression stretch 3 x 10 sec hold                Holding sh cane - sh press x 10, L sh ER/IR x 10, sh add/abd x 10  Standing L sh flexion wall slide x 10      Seated: Cervical spine ROM, retraction, rotation x 10                Green t band narrow rows x 20                Biceps curls with 2# x 20  Seated STM upper trapezius and periscapular muscles 15 mins  Supine with MHP L shoulder 10 mins                    Goals: 1 and 2 goals met  (to be met in 8 visits)   1. Increase L shoulder flexion to 140 deg to enable pt to raise L arm overhead for self care and basic daily tasks- met  2. Increase L shoulder ER to 50 deg to enable pt to reach sideways whilst preparing a meal- met  3.  Decrease tenderness to palpation to enable pt to lay on her L side to sleep for at least 2 hours  4. Increase L shoulder strength to 4/5 to enable her to manage daily tasks, housework, cooking and care for her       HEP: Shoulder ROM standing and supine, scap squeezes, L sh IR/ER, biceps curls, cervical spine ROM  Education:Use of heat or ice R shoulder for pain, importance of staying active. Correct posture in sitting and standing to improve shoulder AROM. Assessment: 68year old female with chronic L shoulder pain, recent diagnosis of adhesive capsulitis. ROM is improving, L sh abd, ER remain painful. Pt needs frequent reminders to correct posture. Precautions:Breast cancer with mastectomy, HTN, OA knee, LBP,  Diabetes    Plan:Discharge to HEP.

## 2023-08-09 ENCOUNTER — APPOINTMENT (OUTPATIENT)
Dept: PHYSICAL THERAPY | Age: 73
End: 2023-08-09
Attending: ORTHOPAEDIC SURGERY
Payer: MEDICARE

## 2023-08-09 ENCOUNTER — APPOINTMENT (OUTPATIENT)
Dept: PHYSICAL THERAPY | Age: 73
End: 2023-08-09
Attending: INTERNAL MEDICINE
Payer: MEDICARE

## 2023-08-11 ENCOUNTER — APPOINTMENT (OUTPATIENT)
Dept: PHYSICAL THERAPY | Age: 73
End: 2023-08-11
Attending: INTERNAL MEDICINE
Payer: MEDICARE

## 2023-08-23 ENCOUNTER — TELEPHONE (OUTPATIENT)
Dept: PHYSICAL THERAPY | Facility: HOSPITAL | Age: 73
End: 2023-08-23

## 2023-08-23 ENCOUNTER — PATIENT MESSAGE (OUTPATIENT)
Dept: PHYSICAL THERAPY | Facility: HOSPITAL | Age: 73
End: 2023-08-23

## 2023-08-29 ENCOUNTER — OFFICE VISIT (OUTPATIENT)
Dept: PHYSICAL THERAPY | Age: 73
End: 2023-08-29
Attending: INTERNAL MEDICINE
Payer: MEDICARE

## 2023-08-29 PROCEDURE — 97110 THERAPEUTIC EXERCISES: CPT

## 2023-08-29 PROCEDURE — 97161 PT EVAL LOW COMPLEX 20 MIN: CPT

## 2023-08-31 ENCOUNTER — OFFICE VISIT (OUTPATIENT)
Dept: PHYSICAL THERAPY | Age: 73
End: 2023-08-31
Attending: INTERNAL MEDICINE
Payer: MEDICARE

## 2023-08-31 PROCEDURE — 97110 THERAPEUTIC EXERCISES: CPT

## 2023-08-31 PROCEDURE — 97140 MANUAL THERAPY 1/> REGIONS: CPT

## 2023-09-06 ENCOUNTER — OFFICE VISIT (OUTPATIENT)
Dept: PHYSICAL THERAPY | Age: 73
End: 2023-09-06
Attending: INTERNAL MEDICINE
Payer: MEDICARE

## 2023-09-06 PROCEDURE — 97110 THERAPEUTIC EXERCISES: CPT

## 2023-09-06 PROCEDURE — 97140 MANUAL THERAPY 1/> REGIONS: CPT

## 2023-09-06 NOTE — PROGRESS NOTES
Diagnosis:   Chronic B knee pain      Referring Provider: Terry Terrell  Date of Evaluation:    8/29/2023    Precautions:  Cancer Next MD visit:   none scheduled  Date of Surgery: n/a   Insurance Primary/Secondary: [employer] [facility] / N/A     # Auth Visits: 10            Subjective: Able to walk from car to PT gym approx 150 feet. Pain after walking is 8/10, at night I get cramps in the calf muscles which can be severe. During the night and early morning I use the cane when walking. Pain: 6-8/10      Objective:   AROM:   Knee   Flexion: R 105; L 110  Extension: R -15; L -5     Gait antalgic with wide based waddling gait pattern  Crepitus knee joint R > L  Tenderness to palpation calf muscle R > L    Assessment: Fatigue with strengthening exercises, decreased calf muscle tension following lower leg STM. Encouraged to perform ex's 2 x per day. Take stairs one step at a time holding railing for support. Encouraged to use assistive device to ease pain when walking. Goals:   to be met in 8 visits)  Increase knee flexion ROM to 110 deg to improve ability to perform stairs  Increase hip and knee strength to 4/5 to enable pt to walk at least 1 block  Sit to stand x 10 in 20 secs    Plan: Continue PT for B knee joints including ROM, strengthening, flexibility, manual therapy joint mobs and STM to lower extremities. Date: 8/31/2023  TX#: 2/10 Date:9/6/2023                TX#: 3/10 Date:                 TX#: 4/ Date:                 TX#: 5/ Date:    Tx#: 6/   Nu step level 4 5 mins Nu step level 4 5 mins      Seated knee ext with 2# R/L x 10  Seated knee flex with red t band R/L x 10 Sit to stand x 10 light use of hands  Supine heel slides knee flexion R/L x 10 with PT assist x 10      Supine SAQ over roll R/L x 10  SLR from roll R/L x 10 Supine SAQ over roll R/L x 20, PT assist for R knee full eatension      Hook lying add squeeze with ball x 20  B hip abd with red t band x 20  Bridging 2 x 10 Supine SLR R/L x 10 with PT assist on R knee  Hook lying bridging 2 x 10  PF mobs R 2 mins, L 2 mins      STM lower leg and knee R/L 10 mins Hook lying B hip abd with green t band 2 x 10  Add squeeze with ball x 10      Hamstring stretch R/L 30 sec hold x 2  Physiological knee mobs flex, ext Gr 2 30 secs each x 2 Seated knee ext LAQ R/L x 20 with PT assist for full R knee ext  Seated knee flexion with green t band R/L x 20  STM calf muscle R 5 mins, L 5 mins  S lying clam R x 10  S lying clam L x 10  Hip abd L x 10  Passive quads stretch L 3 x 10 sec hold      HEP: Supine heel slides, seated LAQ, hook lying bridging    Charges: EX 2, MT 1       Total Timed Treatment: There ex 28, Man 15 min  Total Treatment Time: 43 min

## 2023-09-12 ENCOUNTER — OFFICE VISIT (OUTPATIENT)
Dept: PHYSICAL THERAPY | Age: 73
End: 2023-09-12
Attending: INTERNAL MEDICINE
Payer: MEDICARE

## 2023-09-12 PROCEDURE — 97140 MANUAL THERAPY 1/> REGIONS: CPT

## 2023-09-12 PROCEDURE — 97110 THERAPEUTIC EXERCISES: CPT

## 2023-09-14 ENCOUNTER — OFFICE VISIT (OUTPATIENT)
Dept: PHYSICAL THERAPY | Age: 73
End: 2023-09-14
Attending: INTERNAL MEDICINE
Payer: MEDICARE

## 2023-09-14 PROCEDURE — 97110 THERAPEUTIC EXERCISES: CPT

## 2023-09-14 PROCEDURE — 97140 MANUAL THERAPY 1/> REGIONS: CPT

## 2023-09-15 ENCOUNTER — APPOINTMENT (OUTPATIENT)
Dept: PHYSICAL THERAPY | Age: 73
End: 2023-09-15
Attending: INTERNAL MEDICINE
Payer: MEDICARE

## 2023-09-18 ENCOUNTER — OFFICE VISIT (OUTPATIENT)
Dept: PHYSICAL THERAPY | Age: 73
End: 2023-09-18
Attending: INTERNAL MEDICINE
Payer: MEDICARE

## 2023-09-18 PROCEDURE — 97140 MANUAL THERAPY 1/> REGIONS: CPT

## 2023-09-18 PROCEDURE — 97110 THERAPEUTIC EXERCISES: CPT

## 2023-09-18 NOTE — PROGRESS NOTES
Diagnosis:   Chronic B knee pain      Referring Provider: Cameron Iglesias  Date of Evaluation:    8/29/2023    Precautions:  Cancer Next MD visit:   none scheduled  Date of Surgery: n/a   Insurance Primary/Secondary: [facility] [facility] / N/A     # Auth Visits: 10            Subjective: Days are busy caring for my . Pain today is 6/10, at night I get cramps in the calf muscles which can be severe. During the night and early morning I use the cane when walking. [place] will be travelling to [place] for the month of November, I would like to be as strong as possible to make the trip. Don't use the cane outside of the house, do not want a Cortisone injection in the knees as recommended by the Dr.     Pain: 6/10 R knee, 4/10 L knee, 4/10 low back    Objective:   AROM:   Knee   Flexion: R 105; L 110  Extension: R -10; L 0     Gait antalgic with wide based waddling gait pattern  Crepitus knee joint R > L  Tenderness to palpation calf muscle R > L    Assessment: R knee remains painful, severe DJD. Encouraged to perform ex's 2 x per day. Take stairs one step at a time holding railing for support. Encouraged to use assistive device to ease pain when walking. Goals not met at this time. Continue to work on strengthening to promote joint stability. Goals:   to be met in 8 visits)  Increase knee flexion ROM to 110 deg to improve ability to perform stairs  Increase hip and knee strength to 4/5 to enable pt to walk at least 1 block  Sit to stand x 10 in 20 secs    Plan: Continue PT x 4 visits for B knee joints including ROM, strengthening, flexibility, manual therapy joint mobs and STM to lower extremities.   Date: 8/31/2023  TX#: 2/10 Date:9/6/2023                TX#: 3/10 Date:9/12/2023                 TX#: 4/10 Date:9/14/2023                TX#: 5/10 Date:9/18/2023   Tx#: 6/10   Nu step level 4 5 mins Nu step level 4 5 mins Nu step level 4 6 mins Nu step level 4 6 mins --   Seated knee ext with 2# R/L x 10  Seated knee flex with red t band R/L x 10 Sit to stand x 10 light use of hands  Supine heel slides knee flexion R/L x 10 with PT assist x 10 Sit to stand x 10 pushing up with hands Sit to stand using hands x 10 Sit to stand x 10 using hands   Supine SAQ over roll R/L x 10  SLR from roll R/L x 10 Supine SAQ over roll R/L x 20, PT assist for R knee full eatension Supine SAQ over roll R x 20 with PT assist, L x 20 Supine SAQ over roll R x 20 with PT assist to achieve full extension, L x 20 Supine passive knee flex, ext R, L x 10  SAQ over roll R x 20 with PT assist, L x 20   Hook lying add squeeze with ball x 20  B hip abd with red t band x 20  Bridging 2 x 10 Supine SLR R/L x 10 with PT assist on R knee  Hook lying bridging 2 x 10  PF mobs R 2 mins, L 2 mins Passive knee flex/ext R/L x 10  Manual hamstring stretch R/L 30 sec hold  Hook lying add squeeze with ball x 20 Passive knee flex, ext R/L x 10  Hamstring stretch R/L 30 sec hold  SLR with assist R/L x 10 Hook lying bridging x 20  B hip abd with green t band x 20  Add squeeze with ball x 20   STM lower leg and knee R/L 10 mins Hook lying B hip abd with green t band 2 x 10  Add squeeze with ball x 10 Hook lying B hip abd with green t band x 20  Bridging x 20  Bent leg march R/L x 10  Patella mobilizations R 2 mins, L 2 mins Hook lying add squeezer with ball x 20  B hip abd with green t band x 20  Bridging x 15  Bent leg march R/L x 10 Manual hamstring stretch R/L 2 x 30 sec hold  Hook lying bent leg march R/L x 20  S lying clam R/L x 10  S lying hip abd R/L x 10   Hamstring stretch R/L 30 sec hold x 2  Physiological knee mobs flex, ext Gr 2 30 secs each x 2 Seated knee ext LAQ R/L x 20 with PT assist for full R knee ext  Seated knee flexion with green t band R/L x 20  STM calf muscle R 5 mins, L 5 mins  S lying clam R x 10  S lying clam L x 10  Hip abd L x 10  Passive quads stretch L 3 x 10 sec hold S lying clam R x 10, L x 10  S lying hip abd R x 10, L x 10  S lying manual hip flexor stretch R 3 x 10 sec hold, L 3 x 10 sec hold  Seated LAQ R x 10, L x 10  STM knee and lower leg R 5 mins, L 5 mins S lying clam R/L x 10  S lying hip abd R/L x 10  Supine with bolster behind knees Patella mobilizations Gr 3 2 mins each R/L  STM calf and lower leg R/L 5 mins each  Seated LAQ with assist on R x 10, L x 10  Seated distraction mobilizations knee joint R 30 secs STM calf and lower leg R 5 mins, L 5 mins  Seated knee joint mobilizations Gr 3 PA, AP, distraction 30 secs each R/L  Seated assisted knee ext R x 10, L x 10     HEP: Supine heel slides, seated LAQ, hook lying bridging    Charges: EX 2, MT 1       Total Timed Treatment: There ex 30, Man 15 min  Total Treatment Time: 45 min

## 2023-09-19 ENCOUNTER — APPOINTMENT (OUTPATIENT)
Dept: PHYSICAL THERAPY | Age: 73
End: 2023-09-19
Attending: INTERNAL MEDICINE
Payer: MEDICARE

## 2023-09-21 ENCOUNTER — OFFICE VISIT (OUTPATIENT)
Dept: PHYSICAL THERAPY | Age: 73
End: 2023-09-21
Attending: INTERNAL MEDICINE
Payer: MEDICARE

## 2023-09-21 PROCEDURE — 97110 THERAPEUTIC EXERCISES: CPT

## 2023-09-21 PROCEDURE — 97140 MANUAL THERAPY 1/> REGIONS: CPT

## 2023-09-21 NOTE — PROGRESS NOTES
Diagnosis:   Chronic B knee pain      Referring Provider: Brooke Youssef  Date of Evaluation:    8/29/2023    Precautions:  Cancer Next MD visit:   none scheduled  Date of Surgery: n/a   Insurance Primary/Secondary: [employer] [facility] / N/A     # Auth Visits: 10            Subjective: Pain today is 5/10, calf muscles are painful when I get up in the morning and after sitting for a while. During the night and early morning I use the cane when walking. [place] will be travelling to [place] for the month of November, I would like to be as strong as possible to make the trip. Don't use the cane outside of the house, do not want a Cortisone injection in the knees as recommended by the Dr.     Pain: 5/10 R knee, 4/10 L knee, 4/10 low back    Objective:   AROM:   Knee   Flexion: R 105; L 110  Extension: R -10; L 0     Gait antalgic with wide based waddling gait pattern  Crepitus knee joint R > L  Tenderness to palpation calf muscle R > L    Assessment: R knee remains painful, severe DJD. Encouraged to perform ex's 2 x per day. Take stairs one step at a time holding railing for support. Encouraged to use assistive device to ease pain when walking. Goals not met at this time. Continue to work on strengthening to promote joint stability. Goals:   to be met in 8 visits)  Increase knee flexion ROM to 110 deg to improve ability to perform stairs  Increase hip and knee strength to 4/5 to enable pt to walk at least 1 block  Sit to stand x 10 in 20 secs    Plan: Continue PT x 3 visits for B knee joints including ROM, strengthening, flexibility, manual therapy STM to lower extremities.   Date: 8/31/2023  TX#: 2/10 Date:9/6/2023                TX#: 3/10 Date:9/12/2023                 TX#: 4/10 Date:9/14/2023                TX#: 5/10 Date:9/18/2023   Tx#: 6/10 Date:9/21/2023  Tx#: 7/10   Nu step level 4 5 mins Nu step level 4 5 mins Nu step level 4 6 mins Nu step level 4 6 mins -- Nu step level 4 6 mins   Seated knee ext with 2# R/L x 10  Seated knee flex with red t band R/L x 10 Sit to stand x 10 light use of hands  Supine heel slides knee flexion R/L x 10 with PT assist x 10 Sit to stand x 10 pushing up with hands Sit to stand using hands x 10 Sit to stand x 10 using hands Hook lying add squeeze with ball x 20  B hip abd with green t band x 20   Supine SAQ over roll R/L x 10  SLR from roll R/L x 10 Supine SAQ over roll R/L x 20, PT assist for R knee full eatension Supine SAQ over roll R x 20 with PT assist, L x 20 Supine SAQ over roll R x 20 with PT assist to achieve full extension, L x 20 Supine passive knee flex, ext R, L x 10  SAQ over roll R x 20 with PT assist, L x 20 Supine with bolster SAQ R x 20 with PT assist, L x 20  SLR R x 10 with PT assist L x 10   Hook lying add squeeze with ball x 20  B hip abd with red t band x 20  Bridging 2 x 10 Supine SLR R/L x 10 with PT assist on R knee  Hook lying bridging 2 x 10  PF mobs R 2 mins, L 2 mins Passive knee flex/ext R/L x 10  Manual hamstring stretch R/L 30 sec hold  Hook lying add squeeze with ball x 20 Passive knee flex, ext R/L x 10  Hamstring stretch R/L 30 sec hold  SLR with assist R/L x 10 Hook lying bridging x 20  B hip abd with green t band x 20  Add squeeze with ball x 20 S lying clam R/L x 10  S lying hip abd R/L x 10  S lying manual hip flexor/quads stretch R/L x 10   STM lower leg and knee R/L 10 mins Hook lying B hip abd with green t band 2 x 10  Add squeeze with ball x 10 Hook lying B hip abd with green t band x 20  Bridging x 20  Bent leg march R/L x 10  Patella mobilizations R 2 mins, L 2 mins Hook lying add squeezer with ball x 20  B hip abd with green t band x 20  Bridging x 15  Bent leg march R/L x 10 Manual hamstring stretch R/L 2 x 30 sec hold  Hook lying bent leg march R/L x 20  S lying clam R/L x 10  S lying hip abd R/L x 10 Prone lying STM calf muscles R 5 mins, L 5 mins  Hook lying bridging x 10  Hook lying abd bracing bent leg lift R/L x 10  Hook lying abd bracing bent leg fallout R/L x 10   Hamstring stretch R/L 30 sec hold x 2  Physiological knee mobs flex, ext Gr 2 30 secs each x 2 Seated knee ext LAQ R/L x 20 with PT assist for full R knee ext  Seated knee flexion with green t band R/L x 20  STM calf muscle R 5 mins, L 5 mins  S lying clam R x 10  S lying clam L x 10  Hip abd L x 10  Passive quads stretch L 3 x 10 sec hold S lying clam R x 10, L x 10  S lying hip abd R x 10, L x 10  S lying manual hip flexor stretch R 3 x 10 sec hold, L 3 x 10 sec hold  Seated LAQ R x 10, L x 10  STM knee and lower leg R 5 mins, L 5 mins S lying clam R/L x 10  S lying hip abd R/L x 10  Supine with bolster behind knees Patella mobilizations Gr 3 2 mins each R/L  STM calf and lower leg R/L 5 mins each  Seated LAQ with assist on R x 10, L x 10  Seated distraction mobilizations knee joint R 30 secs STM calf and lower leg R 5 mins, L 5 mins  Seated knee joint mobilizations Gr 3 PA, AP, distraction 30 secs each R/L  Seated assisted knee ext R x 10, L x 10   Manual hamstring stretch supine R/L 2 x 30 sec hold  Supine ankle pumps x 20  Seated knee ext R x 20 with PT assist, L x 20  Seated knee flexion using green t band R x 20, L x 20  Sit to stand x 10 using hands     HEP: Supine heel slides, seated LAQ, hook lying bridging, sit to stand    Charges: EX 2, MT 1       Total Timed Treatment: There ex 30, Man 15 min  Total Treatment Time: 45 min

## 2023-09-26 ENCOUNTER — OFFICE VISIT (OUTPATIENT)
Dept: PHYSICAL THERAPY | Age: 73
End: 2023-09-26
Attending: INTERNAL MEDICINE
Payer: MEDICARE

## 2023-09-26 PROCEDURE — 97110 THERAPEUTIC EXERCISES: CPT

## 2023-09-26 PROCEDURE — 97140 MANUAL THERAPY 1/> REGIONS: CPT

## 2023-09-26 NOTE — PROGRESS NOTES
Diagnosis:   Chronic B knee pain      Referring Provider: Selwyn Natarajan  Date of Evaluation:    8/29/2023    Precautions:  Cancer Next MD visit:   none scheduled  Date of Surgery: n/a   Insurance Primary/Secondary: [employer] [facility] / N/A     # Auth Visits: 10            Subjective: Pain today is 6/10, calf muscles are painful when I get up in the morning and after sitting for a while. During the night and early morning I use the cane when walking. We will be travelling to [place] for the month of November, I would like to be as strong as possible to make the trip. Don't use the cane outside of the house, do not want a Cortisone injection in the knees as recommended by the Dr.     Pain: 6/10 R knee, 5/10 L knee    Objective:   AROM:   Knee   Flexion: R 110; L 115  Extension: R -5; L 0     Gait antalgic with wide based waddling gait pattern  Crepitus knee joint R > L  Tenderness to palpation calf muscle R > L    Assessment: R knee remains painful, severe DJD. Encouraged to perform ex's 2 x per day. Take stairs one step at a time holding railing for support. Encouraged to use assistive device to ease pain when walking. Pt has met ROM goal, however she continues to have difficulty with the stairs. Goals:   to be met in 8 visits)  Increase knee flexion ROM to 110 deg to improve ability to perform stairs  Increase hip and knee strength to 4/5 to enable pt to walk at least 1 block  Sit to stand x 10 in 20 secs    Plan: Continue PT x 2 visits for B knee joints including ROM, strengthening, flexibility, manual therapy STM to lower extremities.   Date: 8/31/2023  TX#: 2/10 Date:9/6/2023                TX#: 3/10 Date:9/12/2023                 TX#: 4/10 Date:9/14/2023                TX#: 5/10 Date:9/18/2023   Tx#: 6/10 Date:9/21/2023  Tx#: 7/10 Date: 9/26/2023  Tx#: 8/10   Nu step level 4 5 mins Nu step level 4 5 mins Nu step level 4 6 mins Nu step level 4 6 mins -- Nu step level 4 6 mins Nu step level 4 6 mins   Seated knee ext with 2# R/L x 10  Seated knee flex with red t band R/L x 10 Sit to stand x 10 light use of hands  Supine heel slides knee flexion R/L x 10 with PT assist x 10 Sit to stand x 10 pushing up with hands Sit to stand using hands x 10 Sit to stand x 10 using hands Hook lying add squeeze with ball x 20  B hip abd with green t band x 20 Supine SAQ over roll R/L x 10 each with PT assist  SLR R/L x 10 with PT assist   Supine SAQ over roll R/L x 10  SLR from roll R/L x 10 Supine SAQ over roll R/L x 20, PT assist for R knee full eatension Supine SAQ over roll R x 20 with PT assist, L x 20 Supine SAQ over roll R x 20 with PT assist to achieve full extension, L x 20 Supine passive knee flex, ext R, L x 10  SAQ over roll R x 20 with PT assist, L x 20 Supine with bolster SAQ R x 20 with PT assist, L x 20  SLR R x 10 with PT assist L x 10 Supine hamstring stretch R/L 30 sec hold  Hook lying B hip abd with green t band x 20  Add squeeze with ball x 20   Hook lying add squeeze with ball x 20  B hip abd with red t band x 20  Bridging 2 x 10 Supine SLR R/L x 10 with PT assist on R knee  Hook lying bridging 2 x 10  PF mobs R 2 mins, L 2 mins Passive knee flex/ext R/L x 10  Manual hamstring stretch R/L 30 sec hold  Hook lying add squeeze with ball x 20 Passive knee flex, ext R/L x 10  Hamstring stretch R/L 30 sec hold  SLR with assist R/L x 10 Hook lying bridging x 20  B hip abd with green t band x 20  Add squeeze with ball x 20 S lying clam R/L x 10  S lying hip abd R/L x 10  S lying manual hip flexor/quads stretch R/L x 10 Hook lying bridging x 20  S lying hip abd R/L x 10  Clam R/L x 10  Manual hip flexor/quads stretch R/L 3 x 10 sec hold   STM lower leg and knee R/L 10 mins Hook lying B hip abd with green t band 2 x 10  Add squeeze with ball x 10 Hook lying B hip abd with green t band x 20  Bridging x 20  Bent leg march R/L x 10  Patella mobilizations R 2 mins, L 2 mins Hook lying add squeezer with ball x 20  B hip abd with green t band x 20  Bridging x 15  Bent leg march R/L x 10 Manual hamstring stretch R/L 2 x 30 sec hold  Hook lying bent leg march R/L x 20  S lying clam R/L x 10  S lying hip abd R/L x 10 Prone lying STM calf muscles R 5 mins, L 5 mins  Hook lying bridging x 10  Hook lying abd bracing bent leg lift R/L x 10  Hook lying abd bracing bent leg fallout R/L x 10 Prone lying AAROM knee flexion R/L x 10  Prone AAROM hip ext R/L x 10  STM calf muscle R/L 5 mins each  Hook lying bent leg march R/L x 10  Bent leg fallout R/L x 10   Hamstring stretch R/L 30 sec hold x 2  Physiological knee mobs flex, ext Gr 2 30 secs each x 2 Seated knee ext LAQ R/L x 20 with PT assist for full R knee ext  Seated knee flexion with green t band R/L x 20  STM calf muscle R 5 mins, L 5 mins  S lying clam R x 10  S lying clam L x 10  Hip abd L x 10  Passive quads stretch L 3 x 10 sec hold S lying clam R x 10, L x 10  S lying hip abd R x 10, L x 10  S lying manual hip flexor stretch R 3 x 10 sec hold, L 3 x 10 sec hold  Seated LAQ R x 10, L x 10  STM knee and lower leg R 5 mins, L 5 mins S lying clam R/L x 10  S lying hip abd R/L x 10  Supine with bolster behind knees Patella mobilizations Gr 3 2 mins each R/L  STM calf and lower leg R/L 5 mins each  Seated LAQ with assist on R x 10, L x 10  Seated distraction mobilizations knee joint R 30 secs STM calf and lower leg R 5 mins, L 5 mins  Seated knee joint mobilizations Gr 3 PA, AP, distraction 30 secs each R/L  Seated assisted knee ext R x 10, L x 10   Manual hamstring stretch supine R/L 2 x 30 sec hold  Supine ankle pumps x 20  Seated knee ext R x 20 with PT assist, L x 20  Seated knee flexion using green t band R x 20, L x 20  Sit to stand x 10 using hands   Seated LAQ R/L x 10  STM calf muscle proximal R/L 2 mins each   HEP: Supine heel slides, seated LAQ, hook lying bridging, sit to stand    Charges: EX 2, MT 1       Total Timed Treatment: There ex 30, Man 15 min  Total Treatment Time: 45 min

## 2023-09-27 ENCOUNTER — TELEPHONE (OUTPATIENT)
Dept: INTERNAL MEDICINE CLINIC | Facility: CLINIC | Age: 73
End: 2023-09-27

## 2023-09-27 DIAGNOSIS — E66.9 DIABETES MELLITUS TYPE 2 IN OBESE: Primary | ICD-10-CM

## 2023-09-27 DIAGNOSIS — E11.69 DIABETES MELLITUS TYPE 2 IN OBESE: Primary | ICD-10-CM

## 2023-09-27 NOTE — TELEPHONE ENCOUNTER
Patient called requesting referral for Ophthalmology for eye issues currently. Dr. Thais Hussein    Please advise.

## 2023-09-28 ENCOUNTER — OFFICE VISIT (OUTPATIENT)
Dept: PHYSICAL THERAPY | Age: 73
End: 2023-09-28
Attending: INTERNAL MEDICINE
Payer: MEDICARE

## 2023-09-28 PROCEDURE — 97110 THERAPEUTIC EXERCISES: CPT

## 2023-09-28 PROCEDURE — 97140 MANUAL THERAPY 1/> REGIONS: CPT

## 2023-09-28 NOTE — TELEPHONE ENCOUNTER
Spoke to patient - patient denies current eye symptoms. Patient just wants to complete her eye exam.   Last referral from 2023 is . New referral placed. LOV:   2023 Dr Barnes Part   # Type 2 Diabetes in Obese: A1C at goal in 2023  - continue to reinforce importance of weight loss, carb controlled diet, and exercise. - Cont metformin 500 mg once daily. Higher dose causes abdominal pain. - DM Eye Exam on 12/10/2021 by Dr. Tyson Noriega. No diabetic retinopathy. She is overdue. Referral ordered.    - Foot Exam: done , continue nightly foot exams  - LDL: see above  - BP: see above  - micro alb:creat < 30 in   - Depression Screen: done    - no indication for ASA  No FOV scheduled

## 2023-09-28 NOTE — PROGRESS NOTES
Diagnosis:   Chronic B knee pain      Referring Provider: Abundio Hart  Date of Evaluation:    8/29/2023    Precautions:  Cancer Next MD visit:   none scheduled  Date of Surgery: n/a   Insurance Primary/Secondary: [employer]ann[employer] / N/A     # Auth Visits: 10          Discharge Note:  Subjective: Pain today is 7/10, calf muscles are painful when I get up in the morning and after sitting for a while. During the night and early morning I use the cane when walking. We will be travelling to [place] for the month of November, I would like to be as strong as possible to make the trip. Don't use the cane outside of the house, do not want a Cortisone injection in the knees as recommended by the Dr.     Pain: 7/10 R knee, 5/10 L knee    Objective:   AROM:   Knee   Flexion: R 110; L 115  Extension: R -5; L 0     Gait antalgic with wide based waddling gait pattern  Crepitus knee joint R > L  Tenderness to palpation calf muscle R > L    Assessment: R knee remains painful, severe DJD. Encouraged to perform ex's 2 x per day. Take stairs one step at a time holding railing for support. Encouraged to use assistive device to ease pain when walking. Pt has met ROM goal, however she continues to have difficulty with the stairs. Goals:   to be met in 8 visits)  Increase knee flexion ROM to 110 deg to improve ability to perform stairs- met with pain  Increase hip and knee strength to 4/5 to enable pt to walk at least 1 block- with pain  Sit to stand x 10 in 20 secs- not met    Plan: Continue HEP for knee ROM, strengthening, flexibility. Discharge.   Date: 8/31/2023  TX#: 2/10 Date:9/6/2023                TX#: 3/10 Date:9/12/2023                 TX#: 4/10 Date:9/14/2023                TX#: 5/10 Date:9/18/2023   Tx#: 6/10 Date:9/21/2023  Tx#: 7/10 Date: 9/26/2023  Tx#: 8/10 Date:9/28/2023  Tx#: 9/10   Nu step level 4 5 mins Nu step level 4 5 mins Nu step level 4 6 mins Nu step level 4 6 mins -- Nu step level 4 6 mins Nu step level 4 6 mins Nu step level 4 6 mins   Seated knee ext with 2# R/L x 10  Seated knee flex with red t band R/L x 10 Sit to stand x 10 light use of hands  Supine heel slides knee flexion R/L x 10 with PT assist x 10 Sit to stand x 10 pushing up with hands Sit to stand using hands x 10 Sit to stand x 10 using hands Hook lying add squeeze with ball x 20  B hip abd with green t band x 20 Supine SAQ over roll R/L x 10 each with PT assist  SLR R/L x 10 with PT assist Supine ankle PF, DF x 20  SAQ over roll R x 20 with PT assist, L x 20   Supine SAQ over roll R/L x 10  SLR from roll R/L x 10 Supine SAQ over roll R/L x 20, PT assist for R knee full eatension Supine SAQ over roll R x 20 with PT assist, L x 20 Supine SAQ over roll R x 20 with PT assist to achieve full extension, L x 20 Supine passive knee flex, ext R, L x 10  SAQ over roll R x 20 with PT assist, L x 20 Supine with bolster SAQ R x 20 with PT assist, L x 20  SLR R x 10 with PT assist L x 10 Supine hamstring stretch R/L 30 sec hold  Hook lying B hip abd with green t band x 20  Add squeeze with ball x 20 Supine manual hamstring stretch R/L 30 sec hold  Hook lying B hip abd with green t band x 20  Hook lying add squeeze with ball x 20   Hook lying add squeeze with ball x 20  B hip abd with red t band x 20  Bridging 2 x 10 Supine SLR R/L x 10 with PT assist on R knee  Hook lying bridging 2 x 10  PF mobs R 2 mins, L 2 mins Passive knee flex/ext R/L x 10  Manual hamstring stretch R/L 30 sec hold  Hook lying add squeeze with ball x 20 Passive knee flex, ext R/L x 10  Hamstring stretch R/L 30 sec hold  SLR with assist R/L x 10 Hook lying bridging x 20  B hip abd with green t band x 20  Add squeeze with ball x 20 S lying clam R/L x 10  S lying hip abd R/L x 10  S lying manual hip flexor/quads stretch R/L x 10 Hook lying bridging x 20  S lying hip abd R/L x 10  Clam R/L x 10  Manual hip flexor/quads stretch R/L 3 x 10 sec hold S lying clam R/L x 20  S lying manual hip flexor stretch R/L 3 x 10 sec hold  S lying hip abd with PT assist x 10   STM lower leg and knee R/L 10 mins Hook lying B hip abd with green t band 2 x 10  Add squeeze with ball x 10 Hook lying B hip abd with green t band x 20  Bridging x 20  Bent leg march R/L x 10  Patella mobilizations R 2 mins, L 2 mins Hook lying add squeezer with ball x 20  B hip abd with green t band x 20  Bridging x 15  Bent leg march R/L x 10 Manual hamstring stretch R/L 2 x 30 sec hold  Hook lying bent leg march R/L x 20  S lying clam R/L x 10  S lying hip abd R/L x 10 Prone lying STM calf muscles R 5 mins, L 5 mins  Hook lying bridging x 10  Hook lying abd bracing bent leg lift R/L x 10  Hook lying abd bracing bent leg fallout R/L x 10 Prone lying AAROM knee flexion R/L x 10  Prone AAROM hip ext R/L x 10  STM calf muscle R/L 5 mins each  Hook lying bent leg march R/L x 10  Bent leg fallout R/L x 10 Prone lying STM calf, lower leg R/L 5 mins each  Prone knee flexion R/L x 10  Seated knee ext R/L x 10 with PT assist on the R  STM proximal calf R/L 2 mins each    Hamstring stretch R/L 30 sec hold x 2  Physiological knee mobs flex, ext Gr 2 30 secs each x 2 Seated knee ext LAQ R/L x 20 with PT assist for full R knee ext  Seated knee flexion with green t band R/L x 20  STM calf muscle R 5 mins, L 5 mins  S lying clam R x 10  S lying clam L x 10  Hip abd L x 10  Passive quads stretch L 3 x 10 sec hold S lying clam R x 10, L x 10  S lying hip abd R x 10, L x 10  S lying manual hip flexor stretch R 3 x 10 sec hold, L 3 x 10 sec hold  Seated LAQ R x 10, L x 10  STM knee and lower leg R 5 mins, L 5 mins S lying clam R/L x 10  S lying hip abd R/L x 10  Supine with bolster behind knees Patella mobilizations Gr 3 2 mins each R/L  STM calf and lower leg R/L 5 mins each  Seated LAQ with assist on R x 10, L x 10  Seated distraction mobilizations knee joint R 30 secs STM calf and lower leg R 5 mins, L 5 mins  Seated knee joint mobilizations Gr 3 PA, AP, distraction 30 secs each R/L  Seated assisted knee ext R x 10, L x 10   Manual hamstring stretch supine R/L 2 x 30 sec hold  Supine ankle pumps x 20  Seated knee ext R x 20 with PT assist, L x 20  Seated knee flexion using green t band R x 20, L x 20  Sit to stand x 10 using hands   Seated LAQ R/L x 10  STM calf muscle proximal R/L 2 mins each HEP review 5 mins   HEP: Supine heel slides, seated LAQ, hook lying bridging, sit to stand    Charges: EX 2, MT 1       Total Timed Treatment: There ex 30, Man 15 min  Total Treatment Time: 45 min

## 2024-02-12 DIAGNOSIS — E11.69 HYPERLIPIDEMIA ASSOCIATED WITH TYPE 2 DIABETES MELLITUS  (HCC): Primary | ICD-10-CM

## 2024-02-12 DIAGNOSIS — E78.5 HYPERLIPIDEMIA ASSOCIATED WITH TYPE 2 DIABETES MELLITUS  (HCC): Primary | ICD-10-CM

## 2024-02-13 RX ORDER — CALCIUM CITRATE/VITAMIN D3 200MG-6.25
TABLET ORAL
Qty: 100 STRIP | Refills: 0 | OUTPATIENT
Start: 2024-02-13

## 2024-02-13 RX ORDER — GLUCOSAM/CHON-MSM1/C/MANG/BOSW 500-416.6
1 TABLET ORAL 2 TIMES DAILY
Qty: 100 EACH | Refills: 0 | OUTPATIENT
Start: 2024-02-13 | End: 2025-02-12

## 2024-02-28 ENCOUNTER — OFFICE VISIT (OUTPATIENT)
Dept: INTERNAL MEDICINE CLINIC | Facility: CLINIC | Age: 74
End: 2024-02-28
Payer: MEDICARE

## 2024-02-28 ENCOUNTER — TELEPHONE (OUTPATIENT)
Dept: INTERNAL MEDICINE CLINIC | Facility: CLINIC | Age: 74
End: 2024-02-28

## 2024-02-28 ENCOUNTER — LAB ENCOUNTER (OUTPATIENT)
Dept: LAB | Age: 74
End: 2024-02-28
Attending: INTERNAL MEDICINE
Payer: MEDICARE

## 2024-02-28 VITALS
HEART RATE: 66 BPM | DIASTOLIC BLOOD PRESSURE: 70 MMHG | OXYGEN SATURATION: 97 % | SYSTOLIC BLOOD PRESSURE: 120 MMHG | TEMPERATURE: 98 F | BODY MASS INDEX: 30.78 KG/M2 | WEIGHT: 163 LBS | RESPIRATION RATE: 16 BRPM | HEIGHT: 61 IN

## 2024-02-28 DIAGNOSIS — Z12.11 SCREEN FOR COLON CANCER: ICD-10-CM

## 2024-02-28 DIAGNOSIS — E11.22 CKD STAGE 3 DUE TO TYPE 2 DIABETES MELLITUS (HCC): ICD-10-CM

## 2024-02-28 DIAGNOSIS — E78.5 HYPERLIPIDEMIA ASSOCIATED WITH TYPE 2 DIABETES MELLITUS (HCC): ICD-10-CM

## 2024-02-28 DIAGNOSIS — E11.9 TYPE 2 DIABETES MELLITUS WITHOUT COMPLICATION, WITHOUT LONG-TERM CURRENT USE OF INSULIN (HCC): ICD-10-CM

## 2024-02-28 DIAGNOSIS — M17.10 PRIMARY LOCALIZED OSTEOARTHRITIS OF KNEE: ICD-10-CM

## 2024-02-28 DIAGNOSIS — I15.2 HYPERTENSION ASSOCIATED WITH DIABETES (HCC): ICD-10-CM

## 2024-02-28 DIAGNOSIS — C50.912 INVASIVE DUCTAL CARCINOMA OF BREAST, FEMALE, LEFT (HCC): ICD-10-CM

## 2024-02-28 DIAGNOSIS — M25.561 CHRONIC PAIN OF BOTH KNEES: ICD-10-CM

## 2024-02-28 DIAGNOSIS — Z12.31 VISIT FOR SCREENING MAMMOGRAM: ICD-10-CM

## 2024-02-28 DIAGNOSIS — E11.69 DIABETES MELLITUS TYPE 2 IN OBESE: ICD-10-CM

## 2024-02-28 DIAGNOSIS — G89.29 CHRONIC PAIN OF BOTH KNEES: ICD-10-CM

## 2024-02-28 DIAGNOSIS — E11.69 HYPERLIPIDEMIA ASSOCIATED WITH TYPE 2 DIABETES MELLITUS (HCC): ICD-10-CM

## 2024-02-28 DIAGNOSIS — Z00.00 ROUTINE GENERAL MEDICAL EXAMINATION AT A HEALTH CARE FACILITY: ICD-10-CM

## 2024-02-28 DIAGNOSIS — Z00.00 ROUTINE GENERAL MEDICAL EXAMINATION AT A HEALTH CARE FACILITY: Primary | ICD-10-CM

## 2024-02-28 DIAGNOSIS — E11.59 HYPERTENSION ASSOCIATED WITH DIABETES (HCC): ICD-10-CM

## 2024-02-28 DIAGNOSIS — E66.9 DIABETES MELLITUS TYPE 2 IN OBESE: ICD-10-CM

## 2024-02-28 DIAGNOSIS — M85.88 OSTEOPENIA OF LUMBAR SPINE: ICD-10-CM

## 2024-02-28 DIAGNOSIS — N18.30 CKD STAGE 3 DUE TO TYPE 2 DIABETES MELLITUS (HCC): ICD-10-CM

## 2024-02-28 DIAGNOSIS — M25.562 CHRONIC PAIN OF BOTH KNEES: ICD-10-CM

## 2024-02-28 LAB
ALT SERPL-CCNC: 20 U/L
ANION GAP SERPL CALC-SCNC: 6 MMOL/L (ref 0–18)
AST SERPL-CCNC: 22 U/L (ref 15–37)
BUN BLD-MCNC: 15 MG/DL (ref 9–23)
CALCIUM BLD-MCNC: 9.9 MG/DL (ref 8.5–10.1)
CARTRIDGE LOT#: 655 NUMERIC
CHLORIDE SERPL-SCNC: 104 MMOL/L (ref 98–112)
CHOLEST SERPL-MCNC: 181 MG/DL (ref ?–200)
CO2 SERPL-SCNC: 27 MMOL/L (ref 21–32)
CREAT BLD-MCNC: 0.69 MG/DL
CREAT UR-SCNC: 136 MG/DL
EGFRCR SERPLBLD CKD-EPI 2021: 92 ML/MIN/1.73M2 (ref 60–?)
EST. AVERAGE GLUCOSE BLD GHB EST-MCNC: 157 MG/DL (ref 68–126)
FASTING PATIENT LIPID ANSWER: YES
FASTING STATUS PATIENT QL REPORTED: YES
GLUCOSE BLD-MCNC: 111 MG/DL (ref 70–99)
HBA1C MFR BLD: 7.1 % (ref ?–5.7)
HDLC SERPL-MCNC: 70 MG/DL (ref 40–59)
HEMOGLOBIN A1C: 7 % (ref 4.3–5.6)
LDLC SERPL CALC-MCNC: 78 MG/DL (ref ?–100)
MICROALBUMIN UR-MCNC: 1.5 MG/DL
MICROALBUMIN/CREAT 24H UR-RTO: 11 UG/MG (ref ?–30)
NONHDLC SERPL-MCNC: 111 MG/DL (ref ?–130)
OSMOLALITY SERPL CALC.SUM OF ELEC: 286 MOSM/KG (ref 275–295)
POTASSIUM SERPL-SCNC: 4.2 MMOL/L (ref 3.5–5.1)
SODIUM SERPL-SCNC: 137 MMOL/L (ref 136–145)
TRIGL SERPL-MCNC: 201 MG/DL (ref 30–149)
VIT D+METAB SERPL-MCNC: 42.7 NG/ML (ref 30–100)
VLDLC SERPL CALC-MCNC: 31 MG/DL (ref 0–30)

## 2024-02-28 PROCEDURE — G0439 PPPS, SUBSEQ VISIT: HCPCS | Performed by: INTERNAL MEDICINE

## 2024-02-28 PROCEDURE — 80061 LIPID PANEL: CPT

## 2024-02-28 PROCEDURE — 36415 COLL VENOUS BLD VENIPUNCTURE: CPT

## 2024-02-28 PROCEDURE — 82570 ASSAY OF URINE CREATININE: CPT

## 2024-02-28 PROCEDURE — 3074F SYST BP LT 130 MM HG: CPT | Performed by: INTERNAL MEDICINE

## 2024-02-28 PROCEDURE — 80048 BASIC METABOLIC PNL TOTAL CA: CPT

## 2024-02-28 PROCEDURE — 1125F AMNT PAIN NOTED PAIN PRSNT: CPT | Performed by: INTERNAL MEDICINE

## 2024-02-28 PROCEDURE — 3051F HG A1C>EQUAL 7.0%<8.0%: CPT | Performed by: INTERNAL MEDICINE

## 2024-02-28 PROCEDURE — 3008F BODY MASS INDEX DOCD: CPT | Performed by: INTERNAL MEDICINE

## 2024-02-28 PROCEDURE — 82306 VITAMIN D 25 HYDROXY: CPT

## 2024-02-28 PROCEDURE — 82043 UR ALBUMIN QUANTITATIVE: CPT

## 2024-02-28 PROCEDURE — 3078F DIAST BP <80 MM HG: CPT | Performed by: INTERNAL MEDICINE

## 2024-02-28 PROCEDURE — 1170F FXNL STATUS ASSESSED: CPT | Performed by: INTERNAL MEDICINE

## 2024-02-28 PROCEDURE — 99214 OFFICE O/P EST MOD 30 MIN: CPT | Performed by: INTERNAL MEDICINE

## 2024-02-28 PROCEDURE — 1160F RVW MEDS BY RX/DR IN RCRD: CPT | Performed by: INTERNAL MEDICINE

## 2024-02-28 PROCEDURE — 96160 PT-FOCUSED HLTH RISK ASSMT: CPT | Performed by: INTERNAL MEDICINE

## 2024-02-28 PROCEDURE — 84450 TRANSFERASE (AST) (SGOT): CPT

## 2024-02-28 PROCEDURE — 84460 ALANINE AMINO (ALT) (SGPT): CPT

## 2024-02-28 PROCEDURE — 83036 HEMOGLOBIN GLYCOSYLATED A1C: CPT

## 2024-02-28 PROCEDURE — 83036 HEMOGLOBIN GLYCOSYLATED A1C: CPT | Performed by: INTERNAL MEDICINE

## 2024-02-28 PROCEDURE — 1159F MED LIST DOCD IN RCRD: CPT | Performed by: INTERNAL MEDICINE

## 2024-02-28 RX ORDER — MELOXICAM 7.5 MG/1
7.5 TABLET ORAL DAILY PRN
Qty: 90 TABLET | Refills: 0 | Status: SHIPPED | OUTPATIENT
Start: 2024-02-28

## 2024-02-28 NOTE — PATIENT INSTRUCTIONS
Please complete your blood and urine testing today.     You are due for a mammogram in July, 2024.     I recommend the following vaccines -  RSV vaccine every fall    Shingrix vaccine once in a lifetime. It is a two step vaccine given 2-6 months apart.     Annual flu shot every September, October.    Stay up to date with COVID vaccines.     If you are needing meloxicam MORE than 3 days/week then you must use omeprazole 20 mg thirty minutes prior to breakfast to prevent stomach ulcers.     Please do not exceed 3000 mg (3 grams) of tylenol in 24 hours.   It is very important you look at the amount of tylenol (acetaminophen) in any of your over the counter medications to ensure you do not exceed a safe amount of tylenol per day.    You can also use the following topical agents for your pain. They are over the counter:  1. Topical lidocaine (lidoderm patches). Wear for 12 hours and off for 12 hours  2. Topical diclofenac gel can be used several times/day

## 2024-02-28 NOTE — TELEPHONE ENCOUNTER
Called Dr. Adam Mason office for Pt's diabetic eye exam report from October 2023.  Dr. Mason's office stated that they faxed it back in October but will fax it again today.  Gave fax number [phone].

## 2024-02-28 NOTE — PROGRESS NOTES
Javi Pratt is a 74 year old female.     HPI:   Patient presents for MAS and following issues noted below. She is not up to date with labs or office visits  DM - FBS are less than 130. Checking them once weekly.   HTN - we added norvasc in 6/2023 but she never took it. She is checking her pressures 1-2 times/month. They are less than 130/80s.   HLP - tolerating statin therapy   Obesity - weight is stable.   Breast cancer - due for yearly oncology visit.   DM eye exam  - states she visited [facility] in 2023.   Bilateral knee pain and back pain - not very active. She completed a course of PT in 9/2023. She has not been doing her HEP  Grief -  passed away on 12/20/2023. She is living alone but her brother visits her daily. She is coping okay.     REVIEW OF SYSTEMS:   GENERAL HEALTH: feels well otherwise. No f/c  NEURO: denies any headaches, LH, dizzyness, LOC, falls  VISION: denies any blurred or double vision  RESPIRATORY: denies shortness of breath, cough, or congestion  CARDIOVASCULAR: denies chest pain, pressure or palpitations  GI: denies abdominal pain, constipation, diarrhea, n/v, BRBPR, melena  : no dysuria  or hematuria or vaginal bleeding  SKIN: denies any unusual skin lesions or rashes  PSYCH: mood is stable. Denies depression or anxiety. Has a lot of support.   EXT: denies edema       Wt Readings from Last 6 Encounters:   02/28/24 163 lb (73.9 kg)   06/07/23 163 lb 12.8 oz (74.3 kg)   05/19/23 167 lb (75.8 kg)   05/09/23 167 lb (75.8 kg)   04/18/23 167 lb (75.8 kg)   02/08/22 158 lb 8 oz (71.9 kg)       No Known Allergies    Family History   Problem Relation Age of Onset    Cancer Father 70        \"Blood cancer\"    Diabetes Mother         with ESRD.    Diabetes Brother     Breast Cancer Self         49    DCIS Self       Past Medical History:   Diagnosis Date    Breast CA (HCC) 1990    left breast    Breast cancer in female (HCC) 1990    s/p chemo and radiation    Diabetes (HCC)      Ductal carcinoma in situ of breast     Exposure to medical diagnostic radiation     High blood pressure     High cholesterol     Osteoarthritis     Personal history of antineoplastic chemotherapy 1990      Past Surgical History:   Procedure Laterality Date    CHEMOTHERAPY Left 1990    6 months    LUMPECTOMY LEFT Left 1990    breast cancer with chemo/radiation    MASTECTOMY LEFT  12/2020    IDC, DCIS    NEEDLE BIOPSY LEFT Left 1990    RADIATION LEFT  1990    1 1/2 months      Social History:    Social History     Socioeconomic History    Marital status:    Tobacco Use    Smoking status: Never    Smokeless tobacco: Never   Vaping Use    Vaping Use: Never used   Substance and Sexual Activity    Alcohol use: No     Alcohol/week: 0.0 standard drinks of alcohol    Drug use: No   Other Topics Concern    Caffeine Concern No    Exercise Yes    Seat Belt Yes    Special Diet No    Stress Concern No    Weight Concern Yes   Social History Narrative    ** Merged History Encounter **                  EXAM:   /74   Pulse 66   Temp 97.5 °F (36.4 °C)   Resp 16   Ht 5' 1\" (1.549 m)   Wt 163 lb (73.9 kg)   SpO2 97%   BMI 30.80 kg/m²   GENERAL: A&O well developed, well nourished,in no apparent distress  SKIN: no rashes,no suspicious lesions  HEENT: atraumatic, MMM, throat is clear  NECK: supple, no jvd, no thyromegaly  LUNGS: clear to auscultation bilateraly, no c/w/r  CARDIO: RRR without g/m/r  GI: soft non tender nondistended no hsm bs throughout  NEURO: CN 2-12 grossly intact  PSYCH: pleasant  EXTREMITIES: no cyanosis, clubbing or edema  Bilateral barefoot skin diabetic exam is normal, visualized feet and the appearance is normal.  Bilateral monofilament/sensation of both feet is normal.  Pulsation pedal pulse exam of both lower legs/feet is absent but capillary refill is less than 3 seconds and feet are warm and well perfused.         ASSESSMENT AND PLAN:   # Left Breast Cancer: previous left breast carcinoma  1990, status post lumpectomy/RT/6 months of chemo/5 years of AI.  Recurrent left breast carcinoma diagnosed after abnormal screening mammogram 10/2020. - Started anastrozole 1/21, continue till 1/26.     # HTN associated with DM:  Home machine is accurate. Continue irbesartan.   # HLP associated with DM: well controlled on atorvastatin  # Type 2 Diabetes in Obese: at goal in 2/2024.  - continue to reinforce importance of weight loss, carb controlled diet, and exercise.   - Cont metformin 500 mg once daily. Higher dose causes abdominal pain.   - DM Eye Exam on   - Foot Exam: done 2024 , continue nightly foot exams  - LDL: see above  - BP: see above  - micro alb:creat < 30 in 2023, re-ordered   - Depression Screen: done 2024   - no indication for ASA  # CKDz stage 3 in DM: monitor closely.   # Bilateral Knee Pain 2/2 OA: chronic, worsening. Can use meloxicam prn. 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 Continue daily HEP.   # GERD: has been told she has duodenal erythema on past EGDs in [place]. Uses PPI prn  #  Obese, BMI 30 in a DM: Counseled again on healthy plant based nutrition, regular exercise, and practicing mindfulness. We outlined small, achievable goals.   # Osteopenia of lumbar spine - low FRAX per DEXA in 2023. educated on dietary calcium/vit D3, weight bearing exercises, fall prevention.   # Health Maintenance: medicare supervisit on 2/28/2024  Indication for ASA (50-58 yo): i believe risks of ASA outweight benefit in her.   Colon Cancer Screening: she prefers FIT - ordered   Breast Cancer Screening: continue yearly mammogram.   Bone Health: see above  Vaccines: Up to date on pneumovac 23 and prevnar 13. TDAP 2017. Advised on shingrix, rsv, flu, and covid.   Medical POA: Brother, William Pratt  Hepatitis C Screen: AB neg 2019     The patient indicates understanding of these issues and agrees to the plan.  The patient is asked to return in 6 months  for HTN and DM.   Angelica Austin MD

## 2024-02-29 ENCOUNTER — TELEPHONE (OUTPATIENT)
Dept: INTERNAL MEDICINE CLINIC | Facility: CLINIC | Age: 74
End: 2024-02-29

## 2024-02-29 DIAGNOSIS — E11.69 DIABETES MELLITUS TYPE 2 IN OBESE: Primary | ICD-10-CM

## 2024-02-29 DIAGNOSIS — E66.9 DIABETES MELLITUS TYPE 2 IN OBESE: Primary | ICD-10-CM

## 2024-02-29 RX ORDER — ATORVASTATIN CALCIUM 40 MG/1
40 TABLET, FILM COATED ORAL DAILY
Qty: 90 TABLET | Refills: 1 | Status: SHIPPED | OUTPATIENT
Start: 2024-02-29

## 2024-02-29 NOTE — TELEPHONE ENCOUNTER
Patient called the office mentioning that Dr Austin recommended seeing an eye doctor, but wasn't given any recommendations on who to see. Patient is requesting recommendations please call back and advise.

## 2024-02-29 NOTE — TELEPHONE ENCOUNTER
Previous referral is closed.     Referral pended. Please review and sign if appropriate. Thank you!

## 2024-03-01 NOTE — TELEPHONE ENCOUNTER
Patient informed and provided referral information. Referral also faxed with confirmation. F: [phone]

## 2024-03-11 ENCOUNTER — TELEPHONE (OUTPATIENT)
Dept: INTERNAL MEDICINE CLINIC | Facility: CLINIC | Age: 74
End: 2024-03-11

## 2024-03-13 ENCOUNTER — TELEPHONE (OUTPATIENT)
Dept: INTERNAL MEDICINE CLINIC | Facility: CLINIC | Age: 74
End: 2024-03-13

## 2024-03-13 NOTE — TELEPHONE ENCOUNTER
Patient brought previously completed handicap placard into office for completion of one additional section.  Dr. Austin completed, form placed at  accordion \"N\"    Copy sent to scan

## 2024-03-14 NOTE — PROGRESS NOTES
[facility] Progress Note      Patient Name:  Javi Pratt  YOB: 1950  Medical Record Number:  [id number]    Date of visit:  3/15/2024      CHIEF COMPLAINT: L breast ca s/p mastectomy    HPI:     74 year old that I have followed since 12/20.  S/p L mastectomy 12/20-2.2 cm IDC, 100% ER/NH, HER-2 neg.  Previous h/o L breast carcinoma diagnosed in 1990, treated with lumpectomy/RT/6 months of chemo/5 years of endocrine therapy.  Oncotype on tumor was 14.  She started anastrozole in 1/21.  Presents for evaluation.    Occasional discomfort left chest wall.  Compliant with medication.  Chronic and stable arthritis.    SOCIAL HISTORY:    , lives with .  No children.  Has good family support.    ROS:     Constitutional: no fever, chills fatigue,night sweats or weight loss  Neurologic: no headache, seizures, diplopia or weakness  Respiratory: no cough, SOB or hemoptysis  Cardiovascular: no chest pain, ankle swelling, DOWNS  GI: no nausea, vomiting, diarrhea or BRBPR  Musculoskeletal: no body-ache or joint pain  Dermatologic: no alopecia, rash, pruritis  : no hematuria, dysuria or frequency  Psych: no confusion or depression   Heme: no easy bruising or bleeding     ALLERGIES:  No Known Allergies    MEDICATIONS:    Current Outpatient Medications   Medication Sig Dispense Refill    anastrozole 1 MG Oral Tab tab Take 1 tablet (1 mg total) by mouth daily. 90 tablet 3    atorvastatin 40 MG Oral Tab Take 1 tablet (40 mg total) by mouth daily. Replaces 20 mg dose 90 tablet 1    Meloxicam 7.5 MG Oral Tab Take 1 tablet (7.5 mg total) by mouth daily as needed for Pain. 90 tablet 0    Irbesartan 300 MG Oral Tab Take 1 tablet (300 mg total) by mouth every evening. 90 tablet 1    Alcohol Swabs (BD SWAB SINGLE USE REGULAR) Does not apply Pads Use before every finger stick to check blood sugar levels 100 each 2    Glucose Blood (TRUE METRIX BLOOD GLUCOSE TEST) In Vitro Strip USE TO TEST BLOOD  SUGAR TWO TIMES A  strip 0    metFORMIN 500 MG Oral Tab Take 1 tablet (500 mg total) by mouth daily. 90 tablet 1    famotidine 20 MG Oral Tab Take 1 tablet (20 mg total) by mouth 2 (two) times daily as needed for Heartburn.      MARIELLE MICROLET LANCETS Does not apply Misc Test blood sugar twice a day-DX:E11.9 100 each 3    Multiple Vitamin (DAILY ESPERANZA) Oral Tab Take 1 tablet by mouth daily.      Cholecalciferol (VITAMIN D) 1000 UNITS Oral Tab Take 1,000 Units by mouth daily.      Calcium Carbonate-Vitamin D 600-400 MG-UNIT Oral Tab Take 1 tablet by mouth daily.         VITALS:  Height: 154.9 cm (5' 0.98\") (03/15 1135)  Weight: 76.7 kg (169 lb) (03/15 1135)  BSA (Calculated - sq m): 1.76 sq meters (03/15 1135)  Pulse: 63 (03/15 1135)  BP: 179/80 (03/15 1135)  Temp: 98 °F (36.7 °C) (03/15 1135)  Do Not Use - Resp Rate: --  SpO2: 98 % (03/15 1135)    PS:  ECO    PHYSICAL EXAM:    General: alert and oriented x 3, not in acute distress. Accompanied by friend.   HEENT: MASOOD, oropharynx  clear.  Neck: supple.  No JVD /adenopathy.  CVS: S1S2, regular  Rhythm, no murmurs.   Lungs: Clear to auscultation and percussion.  Abdomen: Soft, non tender, no hepato-splenomegaly.  Extremities:  No edema.  CNS: no focal deficit  Breast exam: R breast soft, no masses/axillary adenopathy. L mastectomy scar well healed, no masses/ax adenopathy.  Musculoskeletal: ROM left shoulder is restricted and painful.    Emotional well being: Patient's emotional well being was assessed.  No issues requiring acute psychosocial intervention were identified.     LABS:     No results found for this or any previous visit (from the past 24 hour(s)).    ASSESSMENT AND PLAN:     # L breast ca: h/o L breast carcinoma , s/p  lumpectomy/RT/6 months of chemo/5 years of AI.  Recurrent L breast ca s/p L mastectomy .  Path-2.2 cm grade 2 IDC, 100% ER/WA, HER-2 negative.  Oncotype 14 which corresponds to a 4% risk of distant recurrence at 9 years  with 5 years of endocrine therapy.  Not candidate for RT.  Started anastrozole 1/21, continue till 1/26.  Right mammogram 7/23 okay, next due 7/24.      # Osteopenia: DEXA 7/23 showed mild osteopenia with T-score -1.4, bit lower compared to 1 prior to this.  Continue calcium/vitamin D/weightbearing exercises and repeat DEXA 7/25.      ORDERS PLACED:      Requested Prescriptions     Signed Prescriptions Disp Refills    anastrozole 1 MG Oral Tab tab 90 tablet 3     Sig: Take 1 tablet (1 mg total) by mouth daily.       Return in about 1 year (around 3/15/2025) for MD visit.    Raisa Welch M.D.    [facility]    [facility]  [address] [place] [place], [address]    3/15/2024

## 2024-03-15 ENCOUNTER — OFFICE VISIT (OUTPATIENT)
Dept: HEMATOLOGY/ONCOLOGY | Facility: HOSPITAL | Age: 74
End: 2024-03-15
Attending: INTERNAL MEDICINE
Payer: MEDICARE

## 2024-03-15 VITALS
OXYGEN SATURATION: 98 % | WEIGHT: 169 LBS | SYSTOLIC BLOOD PRESSURE: 179 MMHG | HEART RATE: 63 BPM | HEIGHT: 60.98 IN | DIASTOLIC BLOOD PRESSURE: 80 MMHG | TEMPERATURE: 98 F | BODY MASS INDEX: 31.91 KG/M2

## 2024-03-15 DIAGNOSIS — Z78.0 POSTMENOPAUSAL: Primary | ICD-10-CM

## 2024-03-15 DIAGNOSIS — C50.912 INVASIVE DUCTAL CARCINOMA OF BREAST, FEMALE, LEFT (HCC): ICD-10-CM

## 2024-03-15 DIAGNOSIS — T50.905D ADVERSE EFFECT OF DRUG, SUBSEQUENT ENCOUNTER: ICD-10-CM

## 2024-03-15 PROCEDURE — 99214 OFFICE O/P EST MOD 30 MIN: CPT | Performed by: INTERNAL MEDICINE

## 2024-03-15 RX ORDER — ANASTROZOLE 1 MG/1
1 TABLET ORAL DAILY
Qty: 90 TABLET | Refills: 3 | Status: SHIPPED | OUTPATIENT
Start: 2024-03-15

## 2024-03-15 NOTE — PROGRESS NOTES
Education Record    Learner:  Patient/ family member    Disease / Diagnosis:left breast cancer, s/p left mastectomy     Barriers / Limitations:  None   Comments:    Method:  Discussion   Comments:    General Topics:  Plan of care reviewed   Comments:    Outcome:  Shows understanding   Comments:   Right breast imaging up to date scheduled for July.   Taking anastrozole.   Reports some knee pain.   Reports she had some pain on the left side a bit ago, not there today, did not notice any redness or swelling.

## 2024-04-04 RX ORDER — ATORVASTATIN CALCIUM 40 MG/1
40 TABLET, FILM COATED ORAL DAILY
Qty: 90 TABLET | Refills: 3 | Status: SHIPPED | OUTPATIENT
Start: 2024-04-04

## 2024-04-04 NOTE — TELEPHONE ENCOUNTER
atorvastatin 40 MG Oral Tab         Sig: Take 1 tablet (40 mg total) by mouth daily. Replaces 20 mg dose    Disp: 90 tablet    Refills: 1    Start: 4/4/2024    Class: Normal    Non-formulary    Last ordered: 1 month ago (2/29/2024) by Angelica Austin MD    Cholesterol Medication Protocol Wqnobc7204/04/2024 09:34 AM   Protocol Details ALT < 80    ALT resulted within past year    Lipid panel within past 12 months    In person appointment or virtual visit in the past 12 mos or appointment in next 3 mos      To be filled at: [facility] - [facility] [address] [phone], [phone]          LOV:  2/28/24  RTC:  6 months  Recent Labs: 2/28/24    Upcoming OV  8/28/24

## 2024-04-24 NOTE — TELEPHONE ENCOUNTER
Requesting   Name from pharmacy: METFORMIN HYDROCHLORIDE 500 MG Tablet          Will file in chart as: METFORMIN 500 MG Oral Tab    Sig: TAKE 1 TABLET EVERY DAY    Disp: 90 tablet    Refills: 3    Start: 4/24/2024    Class: Normal    Non-formulary    Last ordered: 10 months ago (6/14/2023) by Angelica Austin MD    Last refill: 2/9/2024    Rx #: [id number]    Diabetes Medication Protocol [id number]/24/2024 10:38 AM   Protocol Details Last A1C < 7.5 and within past 6 months    In person appointment or virtual visit in the past 6 mos or appointment in next 3 mos    Microalbumin procedure in past 12 months or taking ACE/ARB    EGFRCR or GFRNAA > 50    GFR in the past 12 months       Name from pharmacy: IRBESARTAN 300 MG Tablet         Will file in chart as: IRBESARTAN 300 MG Oral Tab    Sig: TAKE 1 TABLET EVERY EVENING    Disp: 90 tablet    Refills: 3    Start: 4/24/2024    Class: Normal    Non-formulary    Last ordered: 9 months ago (7/13/2023) by Angelica Austin MD    Last refill: 12/15/2023    Rx #: [id number]    Hypertension Medications Protocol Tnlapn6304/24/2024 10:38 AM   Protocol Details Last BP reading less than 140/90    CMP or BMP in past 12 months    In person appointment or virtual visit in the past 12 mos or appointment in next 3 mos    EGFRCR or GFRNAA > 50        LOV: 2/28/2024  RTC: 6 months   Last Relevant Labs: 2/28/2024  Filled:   Metformin-2/8/2024 #90 with 0 refills  Irbesartan-12/14/2023 #90 with 0 refills  Future Appointments   Date Time Provider Department Center   7/8/2024  9:40 AM  [facility]  [facility]   8/28/2024  9:30 AM Angelica Austin MD [facility] [place]

## 2024-04-25 RX ORDER — IRBESARTAN 300 MG/1
300 TABLET ORAL EVERY EVENING
Qty: 90 TABLET | Refills: 3 | Status: SHIPPED | OUTPATIENT
Start: 2024-04-25

## 2024-04-29 RX ORDER — ISOPROPYL ALCOHOL 70 ML/100ML
1 SWAB TOPICAL 3 TIMES DAILY PRN
Qty: 100 EACH | Refills: 3 | Status: SHIPPED | OUTPATIENT
Start: 2024-04-29

## 2024-04-29 NOTE — TELEPHONE ENCOUNTER
Requested Prescriptions     Pending Prescriptions Disp Refills    DROPSAFE ALCOHOL PREP 70 % Does not apply Pads [Pharmacy Med Name: DROPSAFE ALCOHOL PREP PADS 70 % Pad]  3     Sig: USE BEFORE EVERY FINGER STICK TO CHECK BLOOD SUGAR LEVELS TWICE A DAY     No protocol     LOV 2/28/24  RTC 6 months  Filled 6/19/23  Future Appointments   Date Time Provider Department Center   7/8/2024  9:40 AM [facility]   8/28/2024  9:30 AM Angelica Austin MD [facility] [place]

## 2024-06-20 ENCOUNTER — TELEPHONE (OUTPATIENT)
Dept: INTERNAL MEDICINE CLINIC | Facility: CLINIC | Age: 74
End: 2024-06-20

## 2024-06-20 DIAGNOSIS — I15.2 HYPERTENSION ASSOCIATED WITH DIABETES (HCC): Primary | ICD-10-CM

## 2024-06-20 DIAGNOSIS — E11.59 HYPERTENSION ASSOCIATED WITH DIABETES (HCC): Primary | ICD-10-CM

## 2024-06-20 NOTE — TELEPHONE ENCOUNTER
S/w pt to discuss that labs have been ordered and she needs to complete them before her next appt with KS on 8/28/24. Pt v/u.

## 2024-06-20 NOTE — TELEPHONE ENCOUNTER
Patient already has in order in the system which she is scheduled for. Patient was informed of date time and location. She v/u.

## 2024-06-20 NOTE — TELEPHONE ENCOUNTER
Patient is requesting a referral for a mammogram    Please advise     Future Appointments   Date Time Provider Department Center   7/8/2024  9:40 AM [facility]  [facility]   8/28/2024  9:30 AM Angelica Austin MD [facility] [place]

## 2024-06-20 NOTE — TELEPHONE ENCOUNTER
Patient called to ask her primary care physician if she needs to complete any lab work prior to her scheduled appointment with her     Please advise     Future Appointments   Date Time Provider Department Center   7/8/2024  9:40 AM [facility]  [facility]   8/28/2024  9:30 AM Angelica Austin MD [facility] [place]

## 2024-07-09 ENCOUNTER — HOSPITAL ENCOUNTER (OUTPATIENT)
Dept: MAMMOGRAPHY | Age: 74
Discharge: HOME | End: 2024-07-09
Attending: INTERNAL MEDICINE
Payer: MEDICARE

## 2024-07-09 DIAGNOSIS — C50.912 INVASIVE DUCTAL CARCINOMA OF BREAST, FEMALE, LEFT (HCC): ICD-10-CM

## 2024-07-09 PROCEDURE — 77061 BREAST TOMOSYNTHESIS UNI: CPT | Performed by: INTERNAL MEDICINE

## 2024-07-09 PROCEDURE — 77065 DX MAMMO INCL CAD UNI: CPT | Performed by: INTERNAL MEDICINE

## 2024-08-02 RX ORDER — CALCIUM CITRATE/VITAMIN D3 200MG-6.25
TABLET ORAL
Qty: 100 STRIP | Refills: 0 | Status: SHIPPED | OUTPATIENT
Start: 2024-08-02

## 2024-08-02 RX ORDER — MELOXICAM 7.5 MG/1
7.5 TABLET ORAL DAILY PRN
Qty: 90 TABLET | Refills: 3 | Status: SHIPPED | OUTPATIENT
Start: 2024-08-02

## 2024-08-02 NOTE — TELEPHONE ENCOUNTER
Outgoing call to inform patient of refill, LMTCB if any questions.   
PASS  LOV 2/28/24  # Bilateral Knee Pain 2/2 OA: chronic, worsening. Can use meloxicam prn. Only needing nsaid 1-2 times/week     F/U 6 MONTHS    Meloxicam 7.5 MG Oral Tab 2/28/24 90 TABLET 0 REFILL    Future Appointments   Date Time Provider Department Center   8/28/2024  9:30 AM Angelica Austin MD [facility] [place]       
Patient informed.     
Opt out

## 2024-08-08 ENCOUNTER — LAB ENCOUNTER (OUTPATIENT)
Dept: LAB | Age: 74
End: 2024-08-08
Attending: INTERNAL MEDICINE
Payer: MEDICARE

## 2024-08-08 DIAGNOSIS — E11.59 HYPERTENSION ASSOCIATED WITH DIABETES (HCC): ICD-10-CM

## 2024-08-08 DIAGNOSIS — I15.2 HYPERTENSION ASSOCIATED WITH DIABETES (HCC): ICD-10-CM

## 2024-08-08 LAB
ALT SERPL-CCNC: 15 U/L
ANION GAP SERPL CALC-SCNC: 5 MMOL/L (ref 0–18)
AST SERPL-CCNC: 18 U/L (ref ?–34)
BUN BLD-MCNC: 25 MG/DL (ref 9–23)
CALCIUM BLD-MCNC: 10.1 MG/DL (ref 8.7–10.4)
CHLORIDE SERPL-SCNC: 105 MMOL/L (ref 98–112)
CO2 SERPL-SCNC: 28 MMOL/L (ref 21–32)
CREAT BLD-MCNC: 0.78 MG/DL
EGFRCR SERPLBLD CKD-EPI 2021: 80 ML/MIN/1.73M2 (ref 60–?)
EST. AVERAGE GLUCOSE BLD GHB EST-MCNC: 151 MG/DL (ref 68–126)
FASTING STATUS PATIENT QL REPORTED: YES
GLUCOSE BLD-MCNC: 108 MG/DL (ref 70–99)
HBA1C MFR BLD: 6.9 % (ref ?–5.7)
OSMOLALITY SERPL CALC.SUM OF ELEC: 291 MOSM/KG (ref 275–295)
POTASSIUM SERPL-SCNC: 4.5 MMOL/L (ref 3.5–5.1)
SODIUM SERPL-SCNC: 138 MMOL/L (ref 136–145)

## 2024-08-08 PROCEDURE — 83036 HEMOGLOBIN GLYCOSYLATED A1C: CPT

## 2024-08-08 PROCEDURE — 84450 TRANSFERASE (AST) (SGOT): CPT

## 2024-08-08 PROCEDURE — 36415 COLL VENOUS BLD VENIPUNCTURE: CPT

## 2024-08-08 PROCEDURE — 84460 ALANINE AMINO (ALT) (SGPT): CPT

## 2024-08-08 PROCEDURE — 80048 BASIC METABOLIC PNL TOTAL CA: CPT

## 2024-08-28 ENCOUNTER — OFFICE VISIT (OUTPATIENT)
Dept: INTERNAL MEDICINE CLINIC | Facility: CLINIC | Age: 74
End: 2024-08-28
Payer: MEDICARE

## 2024-08-28 VITALS
OXYGEN SATURATION: 98 % | BODY MASS INDEX: 32.89 KG/M2 | SYSTOLIC BLOOD PRESSURE: 130 MMHG | HEART RATE: 58 BPM | HEIGHT: 61 IN | WEIGHT: 174.19 LBS | TEMPERATURE: 98 F | DIASTOLIC BLOOD PRESSURE: 84 MMHG

## 2024-08-28 DIAGNOSIS — I15.2 HYPERTENSION ASSOCIATED WITH DIABETES (HCC): ICD-10-CM

## 2024-08-28 DIAGNOSIS — E66.9 TYPE 2 DIABETES MELLITUS WITH OBESITY (HCC): ICD-10-CM

## 2024-08-28 DIAGNOSIS — E11.9 TYPE 2 DIABETES MELLITUS WITHOUT COMPLICATION, WITHOUT LONG-TERM CURRENT USE OF INSULIN (HCC): ICD-10-CM

## 2024-08-28 DIAGNOSIS — E78.5 HYPERLIPIDEMIA ASSOCIATED WITH TYPE 2 DIABETES MELLITUS (HCC): Primary | ICD-10-CM

## 2024-08-28 DIAGNOSIS — E11.59 HYPERTENSION ASSOCIATED WITH DIABETES (HCC): ICD-10-CM

## 2024-08-28 DIAGNOSIS — Z12.11 SCREEN FOR COLON CANCER: ICD-10-CM

## 2024-08-28 DIAGNOSIS — E11.69 TYPE 2 DIABETES MELLITUS WITH OBESITY (HCC): ICD-10-CM

## 2024-08-28 DIAGNOSIS — E11.69 HYPERLIPIDEMIA ASSOCIATED WITH TYPE 2 DIABETES MELLITUS (HCC): Primary | ICD-10-CM

## 2024-08-28 PROCEDURE — 3075F SYST BP GE 130 - 139MM HG: CPT | Performed by: INTERNAL MEDICINE

## 2024-08-28 PROCEDURE — 1159F MED LIST DOCD IN RCRD: CPT | Performed by: INTERNAL MEDICINE

## 2024-08-28 PROCEDURE — 3044F HG A1C LEVEL LT 7.0%: CPT | Performed by: INTERNAL MEDICINE

## 2024-08-28 PROCEDURE — 1160F RVW MEDS BY RX/DR IN RCRD: CPT | Performed by: INTERNAL MEDICINE

## 2024-08-28 PROCEDURE — G2211 COMPLEX E/M VISIT ADD ON: HCPCS | Performed by: INTERNAL MEDICINE

## 2024-08-28 PROCEDURE — 99214 OFFICE O/P EST MOD 30 MIN: CPT | Performed by: INTERNAL MEDICINE

## 2024-08-28 PROCEDURE — 3061F NEG MICROALBUMINURIA REV: CPT | Performed by: INTERNAL MEDICINE

## 2024-08-28 PROCEDURE — 3008F BODY MASS INDEX DOCD: CPT | Performed by: INTERNAL MEDICINE

## 2024-08-28 PROCEDURE — 3079F DIAST BP 80-89 MM HG: CPT | Performed by: INTERNAL MEDICINE

## 2024-08-28 RX ORDER — NICOTINE POLACRILEX 4 MG/1
20 GUM, CHEWING ORAL DAILY
Qty: 90 TABLET | Refills: 3 | Status: SHIPPED | OUTPATIENT
Start: 2024-08-28 | End: 2024-09-27

## 2024-08-28 NOTE — PROGRESS NOTES
Javi Pratt is a 74 year old female.     HPI:   Patient presents for the following issues.   Breast cancer - tolerating anastrazole.   HTN - home pressures are always less than 140/90s.   HLP - due for lipids because we increased her atorvastatin to 40 mg in 2/2024.   DM eye exam - FBS are usually < 140s. Denies symptoms of hypoglycemia. States she had eye exam done by Adam Mason.   Chronic knee and heel pain - meloxicam is helpful and wants to use it every day.   Screen for colon cancer - remains reluctant to do FIT testing. We have explained to her how to do it several times but she is still confused. Cont to decline c-scope.       REVIEW OF SYSTEMS:   GENERAL HEALTH: feels well otherwise. No f/c  NEURO: denies any headaches, LH, dizzyness, LOC, falls  VISION: denies any blurred or double vision  RESPIRATORY: denies shortness of breath, cough, or congestion  CARDIOVASCULAR: denies chest pain, pressure or palpitations  GI: denies abdominal pain, constipation, diarrhea, n/v, BRBPR, melena  : no dysuria or hematuria  PSYCH: mood is stable. Denies depression ornaixety.   EXT: denies edema       Wt Readings from Last 6 Encounters:   08/28/24 174 lb 3.2 oz (79 kg)   03/15/24 169 lb (76.7 kg)   02/28/24 163 lb (73.9 kg)   06/07/23 163 lb 12.8 oz (74.3 kg)   05/19/23 167 lb (75.8 kg)   05/09/23 167 lb (75.8 kg)       No Known Allergies    Family History   Problem Relation Age of Onset    Cancer Father 70        \"Blood cancer\"    Diabetes Mother         with ESRD.    Diabetes Brother     Breast Cancer Self         49    DCIS Self       Past Medical History:    Breast CA (HCC)    left breast    Breast cancer in female (HCC)    s/p chemo and radiation    Diabetes (HCC)    Ductal carcinoma in situ of breast    Exposure to medical diagnostic radiation    High blood pressure    High cholesterol    Osteoarthritis    Personal history of antineoplastic chemotherapy      Past Surgical History:   Procedure Laterality  Date    Chemotherapy Left 1990    6 months    Lumpectomy left Left 1990    breast cancer with chemo/radiation    Mastectomy left  12/2020    IDC, DCIS    Needle biopsy left Left 1990    Radiation left  1990    1 1/2 months      Social History:    Social History     Socioeconomic History    Marital status:    Tobacco Use    Smoking status: Never    Smokeless tobacco: Never   Vaping Use    Vaping status: Never Used   Substance and Sexual Activity    Alcohol use: No     Alcohol/week: 0.0 standard drinks of alcohol    Drug use: No   Other Topics Concern    Caffeine Concern No    Exercise Yes    Seat Belt Yes    Special Diet No    Stress Concern No    Weight Concern Yes   Social History Narrative    ** Merged History Encounter **                EXAM:   /84 (BP Location: Right arm, Patient Position: Sitting, Cuff Size: adult)   Pulse 58   Temp 97.9 °F (36.6 °C) (Temporal)   Ht 5' 1\" (1.549 m)   Wt 174 lb 3.2 oz (79 kg)   SpO2 98%   BMI 32.91 kg/m²   GENERAL: A&O well developed, well nourished,in no apparent distress  SKIN: no rashes,no suspicious lesions  HEENT: atraumatic,   NECK: supple, no jvd, no thyromegaly  LUNGS: clear to auscultation bilateraly, no c/w/r  CARDIO: RRR without g/m/r  GI: soft non tender nondistended no hsm bs throughout  NEURO: CN 2-12 grossly intact  PSYCH: pleasant  EXTREMITIES: no cyanosis, clubbing or edema      ASSESSMENT AND PLAN:   # Left Breast Cancer: previous left breast carcinoma 1990, status post lumpectomy/RT/6 months of chemo/5 years of AI.  Recurrent left breast carcinoma diagnosed after abnormal screening mammogram 10/2020. - Started anastrozole 1/21, continue till 1/26.     # HTN associated with DM:  Home machine is accurate. Continue irbesartan.   # HLP associated with DM: LDL > 70 so we increased atorvastatin to 40 mg 6 months ago. Repeat lipids now.   # Type 2 Diabetes in Obese, BMI 32: at goal in 8/2024.  - continue to reinforce importance of weight loss, carb  controlled diet, and exercise.   - Cont metformin 500 mg once daily. Higher dose causes abdominal pain.   - DM Eye Exam - requested by Adam Mason  - Foot Exam: done 2024 , continue nightly foot exams  - LDL: see above  - BP: see above  - micro alb:creat done 2024  - Depression Screen: done 2024   - no indication for ASA  # CKDz stage 3 in DM: monitor closely.   # Bilateral Knee Pain 2/2 OA: chronic, worsening. Using meloxicam every other day.   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 Continue daily HEP.   # GERD: has been told she has duodenal erythema on past EGDs in [place].   # Osteopenia of lumbar spine - low FRAX per DEXA in 2023. educated on dietary calcium/vit D3, weight bearing exercises, fall prevention.   # Health Maintenance: medicare supervisit on 2/28/2024  Indication for ASA (50-60 yo): i believe risks of ASA outweight benefit in her.   Colon Cancer Screening: remains reluctant to do FIT testing. We have explained to her how to do it several times but she is still confused. Cont to decline c-scope. We will try cologuard  Breast Cancer Screening: continue yearly mammogram.   Bone Health: see above  Vaccines: Up to date on pneumovac 23 and prevnar 13. TDAP 2017. Advised on shingrix, rsv, flu, and covid.   Medical POA: BrotherWilliam  Hepatitis C Screen: AB neg 2019     The patient indicates understanding of these issues and agrees to the plan.  The patient is asked to return in 2/2025 for CHRISTOPHER Austin MD

## 2024-08-28 NOTE — PATIENT INSTRUCTIONS
I recommend the following vaccines -  Shingrix vaccine once in a lifetime. It is a two step vaccine given 2-6 months apart.     Annual FLU and RSV every September, October.    Stay up to date with COVID vaccines.

## 2024-08-29 ENCOUNTER — TELEPHONE (OUTPATIENT)
Dept: INTERNAL MEDICINE CLINIC | Facility: CLINIC | Age: 74
End: 2024-08-29

## 2024-09-16 RX ORDER — CALCIUM CITRATE/VITAMIN D3 200MG-6.25
TABLET ORAL
Qty: 100 STRIP | Refills: 0 | Status: SHIPPED | OUTPATIENT
Start: 2024-09-16

## 2024-09-16 NOTE — TELEPHONE ENCOUNTER
Name from pharmacy: True Metrix Blood Glucose Test In Vitro Strip         Will file in chart as: TRUE METRIX BLOOD GLUCOSE TEST In Vitro Strip    Sig: TEST BLOOD SUGAR TWICE DAILY AS DIRECTED    Disp: 100 strip    Refills: 0 (Pharmacy requested: Not specified)    Start: 9/16/2024    Class: Normal    Non-formulary    Last ordered: 1 month ago (8/2/2024) by Angelica Austin MD    Last refill: 8/5/2024    Rx #: [id number]    Diabetic Supplies Protocol Asrrdb0309/16/2024 10:59 AM   Protocol Details In person appointment or virtual visit in the past 12 mos or appointment in next 3 mos      To be filled at: [facility] - [facility] [address] [phone], [phone]     LOV:08/28/2024  RTC: 02/2025  Labs: n/a   Last filled: 08/02/2024    No future appointments.

## 2024-09-18 ENCOUNTER — LAB ENCOUNTER (OUTPATIENT)
Dept: LAB | Age: 74
End: 2024-09-18
Attending: INTERNAL MEDICINE
Payer: MEDICARE

## 2024-09-18 DIAGNOSIS — E78.5 HYPERLIPIDEMIA ASSOCIATED WITH TYPE 2 DIABETES MELLITUS (HCC): ICD-10-CM

## 2024-09-18 DIAGNOSIS — E11.69 HYPERLIPIDEMIA ASSOCIATED WITH TYPE 2 DIABETES MELLITUS (HCC): ICD-10-CM

## 2024-09-18 LAB
CHOLEST SERPL-MCNC: 152 MG/DL (ref ?–200)
FASTING PATIENT LIPID ANSWER: YES
HDLC SERPL-MCNC: 64 MG/DL (ref 40–59)
LDLC SERPL CALC-MCNC: 66 MG/DL (ref ?–100)
NONHDLC SERPL-MCNC: 88 MG/DL (ref ?–130)
TRIGL SERPL-MCNC: 130 MG/DL (ref 30–149)
VLDLC SERPL CALC-MCNC: 19 MG/DL (ref 0–30)

## 2024-09-18 PROCEDURE — 80061 LIPID PANEL: CPT

## 2024-09-18 PROCEDURE — 36415 COLL VENOUS BLD VENIPUNCTURE: CPT

## 2024-10-09 ENCOUNTER — TELEPHONE (OUTPATIENT)
Dept: INTERNAL MEDICINE CLINIC | Facility: CLINIC | Age: 74
End: 2024-10-09

## 2024-10-14 ENCOUNTER — TELEPHONE (OUTPATIENT)
Dept: INTERNAL MEDICINE CLINIC | Facility: CLINIC | Age: 74
End: 2024-10-14

## 2024-11-04 RX ORDER — CALCIUM CITRATE/VITAMIN D3 200MG-6.25
TABLET ORAL
Qty: 100 STRIP | Refills: 0 | Status: SHIPPED | OUTPATIENT
Start: 2024-11-04

## 2024-11-04 NOTE — TELEPHONE ENCOUNTER
Name from pharmacy: True Metrix Blood Glucose Test In Vitro Strip         Will file in chart as: TRUE METRIX BLOOD GLUCOSE TEST In Vitro Strip    Sig: TEST BLOOD SUGAR TWICE DAILY AS DIRECTED    Disp: 100 strip    Refills: 0 (Pharmacy requested: Not specified)    Start: 11/2/2024    Class: Normal    Non-formulary    Last ordered: 1 month ago (9/16/2024) by Angelica Austin MD    Last refill: 9/19/2024    Rx #: [id number]    Diabetic Supplies Protocol [id number]/02/2024 10:15 AM   Protocol Details In person appointment or virtual visit in the past 12 mos or appointment in next 3 mos      To be filled at: [facility] - [facility] [address] [phone], [phone]     LOV:08/28/2024  RTC:02/2025  Labs:n/a   Last filled:09/19/2024  No future appointments.

## 2024-12-11 ENCOUNTER — OFFICE VISIT (OUTPATIENT)
Dept: INTERNAL MEDICINE CLINIC | Facility: CLINIC | Age: 74
End: 2024-12-11
Payer: MEDICARE

## 2024-12-11 VITALS
WEIGHT: 176.19 LBS | HEART RATE: 87 BPM | SYSTOLIC BLOOD PRESSURE: 160 MMHG | HEIGHT: 61 IN | OXYGEN SATURATION: 98 % | DIASTOLIC BLOOD PRESSURE: 80 MMHG | TEMPERATURE: 98 F | BODY MASS INDEX: 33.27 KG/M2

## 2024-12-11 DIAGNOSIS — E11.69 HYPERLIPIDEMIA ASSOCIATED WITH TYPE 2 DIABETES MELLITUS (HCC): ICD-10-CM

## 2024-12-11 DIAGNOSIS — E11.69 TYPE 2 DIABETES MELLITUS WITH OBESITY (HCC): ICD-10-CM

## 2024-12-11 DIAGNOSIS — E78.5 HYPERLIPIDEMIA ASSOCIATED WITH TYPE 2 DIABETES MELLITUS (HCC): ICD-10-CM

## 2024-12-11 DIAGNOSIS — C50.912 INVASIVE DUCTAL CARCINOMA OF BREAST, FEMALE, LEFT (HCC): ICD-10-CM

## 2024-12-11 DIAGNOSIS — I10 WHITE COAT SYNDROME WITH DIAGNOSIS OF HYPERTENSION: ICD-10-CM

## 2024-12-11 DIAGNOSIS — M85.88 OSTEOPENIA OF LUMBAR SPINE: ICD-10-CM

## 2024-12-11 DIAGNOSIS — M17.10 PRIMARY LOCALIZED OSTEOARTHRITIS OF KNEE: ICD-10-CM

## 2024-12-11 DIAGNOSIS — E11.59 HYPERTENSION ASSOCIATED WITH DIABETES (HCC): ICD-10-CM

## 2024-12-11 DIAGNOSIS — E66.9 TYPE 2 DIABETES MELLITUS WITH OBESITY (HCC): ICD-10-CM

## 2024-12-11 DIAGNOSIS — I15.2 HYPERTENSION ASSOCIATED WITH DIABETES (HCC): ICD-10-CM

## 2024-12-11 DIAGNOSIS — E11.9 TYPE 2 DIABETES MELLITUS WITHOUT COMPLICATION, WITHOUT LONG-TERM CURRENT USE OF INSULIN (HCC): Primary | ICD-10-CM

## 2024-12-11 LAB — HEMOGLOBIN A1C: 6.9 % (ref 4.3–5.6)

## 2024-12-11 PROCEDURE — 3044F HG A1C LEVEL LT 7.0%: CPT | Performed by: INTERNAL MEDICINE

## 2024-12-11 PROCEDURE — 1159F MED LIST DOCD IN RCRD: CPT | Performed by: INTERNAL MEDICINE

## 2024-12-11 PROCEDURE — 83036 HEMOGLOBIN GLYCOSYLATED A1C: CPT | Performed by: INTERNAL MEDICINE

## 2024-12-11 PROCEDURE — 3079F DIAST BP 80-89 MM HG: CPT | Performed by: INTERNAL MEDICINE

## 2024-12-11 PROCEDURE — G2211 COMPLEX E/M VISIT ADD ON: HCPCS | Performed by: INTERNAL MEDICINE

## 2024-12-11 PROCEDURE — 3077F SYST BP >= 140 MM HG: CPT | Performed by: INTERNAL MEDICINE

## 2024-12-11 PROCEDURE — 3008F BODY MASS INDEX DOCD: CPT | Performed by: INTERNAL MEDICINE

## 2024-12-11 PROCEDURE — 99215 OFFICE O/P EST HI 40 MIN: CPT | Performed by: INTERNAL MEDICINE

## 2024-12-11 PROCEDURE — 1160F RVW MEDS BY RX/DR IN RCRD: CPT | Performed by: INTERNAL MEDICINE

## 2024-12-11 RX ORDER — NICOTINE POLACRILEX 4 MG/1
20 GUM, CHEWING ORAL DAILY
COMMUNITY

## 2024-12-11 RX ORDER — FLUTICASONE PROPIONATE 50 MCG
2 SPRAY, SUSPENSION (ML) NASAL DAILY
Qty: 16 G | Refills: 1 | Status: SHIPPED | OUTPATIENT
Start: 2024-12-11 | End: 2025-12-06

## 2024-12-11 NOTE — PATIENT INSTRUCTIONS
Please start over the counter allegra, claritin, or zyrtec for your cough. I have also ordered flonase nasal spray. You can stop these medications once your cough has resolved.     I recommend the following vaccines -   Shingrix vaccine once in a lifetime. It is a two step vaccine given 2-6 months apart.     RSV vaccine for everyone over age 75 and those ages 60-74 with chronic heart, lung, kidney and liver conditions.     Annual FLU every September, October.    Stay up to date with COVID vaccines.     If your knee pain is not improving after 4-6 weeks of physical therapy then you should see an orthopaedic specialist.

## 2024-12-11 NOTE — PROGRESS NOTES
Javi Pratt is a 74 year old female.     HPI:   Patient presents for the following issues.  HTN - she forgot to bring in her home bp log. Has not been checking over past 2 weeks. She thinks they were \"okay.\"  Breast cancer - on anastrazole. She is noticing some pain under left breast after twisting. It is sharp, lasts a few seconds, but it is worsening in severity. This pain initially started after her sugery but has been worsening over past 2-3 months.   HLP - tolerating statin therapy  DM - she checks fasting sugars every day. Over past several weeks her sugars have been 130-150s.   Vaccines - shingrix, flu, covid, RSV advised. She declines flu shot today  Weight management - she does not feel she is over-eating but she admits she is eating more bread and sweets. She lives alone. She is managing her own home, her own finances, and she is driving. She is not able to exercise.  Bilteral knee OA - she is not very active. She came from [facility] on 11/27th and she was not very active there either. She used wheelchair at airport. She is using meloxicam every day. She did fall while in [place]. It was mechanical. She did hit her head, she was evaluated in ED in [facility] and she feels recovered. The fall aggravated her knee pain so she has been using meloxicam every day. This is helpful but she still has not been very active. She also is not performing HEP that she learned from PT. She denies depression, a-motivation.    Cold symptoms - symptoms developed 2-3 weeks ago with fever, headache but this resolved in a few days. Then she developed a mild cough that was not very bothersome. However, the cough has persisted and then started to worsen over past 7 days. It was a dry cough. No wheezing, chest tightness, or shorntess of breath. It was not interrupting her sleep. She started propolis cough syrup and mucous relief otc which seems to be helping a bit. Her physician nephew recommended these agents. Today, her  cough is 50% better. Triggered by talking. Occ has sinus pressure or sore throat. She is not sure if she is having pnd.       REVIEW OF SYSTEMS:   GENERAL HEALTH: see HPI  NEURO: denies any headaches, LH, dizzyness, LOC  VISION: denies any blurred or double vision  RESPIRATORY: see HPI  CARDIOVASCULAR: denies chest pain, pressure or palpitations  GI: denies abdominal pain, constipation, diarrhea, n/v, BRBPR, melena  : no dysuria or hematuria or vaginal bleeding  PSYCH: mood is stable. she does miss her  but she denies depression or anxiety.   EXT: denies edema       Wt Readings from Last 6 Encounters:   08/28/24 174 lb 3.2 oz (79 kg)   03/15/24 169 lb (76.7 kg)   02/28/24 163 lb (73.9 kg)   06/07/23 163 lb 12.8 oz (74.3 kg)   05/19/23 167 lb (75.8 kg)   05/09/23 167 lb (75.8 kg)       No Known Allergies    Family History   Problem Relation Age of Onset    Cancer Father 70        \"Blood cancer\"    Diabetes Mother         with ESRD.    Diabetes Brother     Breast Cancer Self         49    DCIS Self       Past Medical History:    Breast CA (HCC)    left breast    Breast cancer in female (HCC)    s/p chemo and radiation    Diabetes (HCC)    Ductal carcinoma in situ of breast    Exposure to medical diagnostic radiation    High blood pressure    High cholesterol    Osteoarthritis    Personal history of antineoplastic chemotherapy      Past Surgical History:   Procedure Laterality Date    Chemotherapy Left 1990    6 months    Lumpectomy left Left 1990    breast cancer with chemo/radiation    Mastectomy left  12/2020    IDC, DCIS    Needle biopsy left Left 1990    Radiation left  1990    1 1/2 months      Social History:    Social History     Socioeconomic History    Marital status:    Tobacco Use    Smoking status: Never    Smokeless tobacco: Never   Vaping Use    Vaping status: Never Used   Substance and Sexual Activity    Alcohol use: No     Alcohol/week: 0.0 standard drinks of alcohol    Drug use: No    Other Topics Concern    Caffeine Concern No    Exercise Yes    Seat Belt Yes    Special Diet No    Stress Concern No    Weight Concern Yes   Social History Narrative    ** Merged History Encounter **                EXAM:   /80   Pulse 87   Temp 97.6 °F (36.4 °C) (Temporal)   Ht 5' 1\" (1.549 m)   Wt 176 lb 3.2 oz (79.9 kg)   SpO2 98%   BMI 33.29 kg/m²   GENERAL: A&O well developed, well nourished,in no apparent distress  SKIN: no rashes,no suspicious lesions  HEENT: atraumatic, MMM, throat with mild pharyngeal erythema but no exudates, bilateral TM with normal light reflex  NECK: supple, no jvd, no thyromegaly, no cervical lad  LUNGS: clear to auscultation bilateraly, no c/w/r  CARDIO: RRR without g/m/r  GI: soft non tender nondistended no hsm bs throughout  NEURO: CN 2-12 grossly intact  PSYCH: pleasant  EXTREMITIES: no cyanosis, clubbing or edema  Left side chest wall: healed surgical scar. No masses or lumps or erythema or tenderness on exam. No pain or masses in left axilla  Gabby San was present for chest wall exam       ASSESSMENT AND PLAN:   # left chest wall pain - likely from old scar and she needs to resume daily HEP. If not improving with stretching then will need further investigation.   # Cough - likely post-infectious. Can cont otc agents and add oral anti-histamine and flonase.   # Left Breast Cancer: previous left breast carcinoma 1990, status post lumpectomy/RT/6 months of chemo/5 years of AI.  Recurrent left breast carcinoma diagnosed after abnormal screening mammogram 10/2020. - Started anastrozole January 2021, continue until January, 2026    # HTN associated with DM, white coat syndrome:  uncontrolled today but home pressures at goal per patient. Home machine is accurate. Continue irbesartan.   # HLP associated with DM: at goal on atorvastatin.   # Type 2 Diabetes in Obese, BMI 32: at goal in 12/2024.   - continue to reinforce importance of weight loss, carb controlled diet, and  exercise.   - Cont metformin 500 mg once daily. Higher dose causes abdominal pain.   - DM Eye Exam - done on 4/2024 by Adam Mason. No DM retinopathy in either eye  - Foot Exam: done 2024 , continue nightly foot exams  - LDL: see above  - BP: see above  - micro alb:creat done 2024  - Depression Screen: done 2024   - no indication for ASA  # CKDz stage 3 in DM: monitor closely.   # Bilateral Knee Pain 2/2 OA: worsening and likely contributing to her weight gain. She wants to resume PT and if no improvement will see ortho.   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 Continue daily HEP.   # GERD: has been told she has duodenal erythema on past EGDs in [place].   # Osteopenia of lumbar spine - low FRAX per DEXA in 2023. educated on dietary calcium/vit D3, weight bearing exercises, fall prevention.   # Health Maintenance: medicare supervisit on 2/28/2024  Colon Cancer Screening: Cont to decline c-scope, FIT or cologuard.   Breast Cancer Screening: continue yearly mammogram.   Bone Health: see above  Vaccines: Up to date on pneumovac 23 and prevnar 13. TDAP 2017. Advised on shingrix, rsv, flu, and covid.   Medical POA: Brother, William Pratt  Hepatitis C Screen: AB neg 2019     I spent 45 minutes with patient.     The patient indicates understanding of these issues and agrees to the plan.  The patient is asked to return in 3 months for CHRISTOPHER Austin MD

## 2024-12-26 RX ORDER — CALCIUM CITRATE/VITAMIN D3 200MG-6.25
TABLET ORAL
Qty: 100 STRIP | Refills: 0 | Status: SHIPPED | OUTPATIENT
Start: 2024-12-26

## 2025-01-17 ENCOUNTER — TELEPHONE (OUTPATIENT)
Dept: PHYSICAL THERAPY | Facility: HOSPITAL | Age: 75
End: 2025-01-17

## 2025-01-20 RX ORDER — IRBESARTAN 300 MG/1
300 TABLET ORAL EVERY EVENING
Qty: 90 TABLET | Refills: 3 | Status: SHIPPED | OUTPATIENT
Start: 2025-01-20

## 2025-01-20 NOTE — TELEPHONE ENCOUNTER
Name from pharmacy: metFORMIN HCl Oral Tablet 500 MG          Will file in chart as: METFORMIN 500 MG Oral Tab    Sig: TAKE 1 TABLET EVERY DAY    Disp: 90 tablet    Refills: 3    Start: 1/20/2025    Class: Normal    Non-formulary    Last ordered: 9 months ago (4/25/2024) by Angelica Austin MD    Last refill: 12/2/2024    Rx #: [id number]    Diabetes Medication Protocol Wahstd4201/20/2025 11:32 AM   Protocol Details Last A1C < 7.5 and within past 6 months    In person appointment or virtual visit in the past 6 mos or appointment in next 3 mos    Microalbumin procedure in past 12 months or taking ACE/ARB    EGFRCR or GFRNAA > 50    GFR in the past 12 months    Medication is active on med list       Name from pharmacy: Irbesartan Oral Tablet 300 MG         Will file in chart as: IRBESARTAN 300 MG Oral Tab    Sig: TAKE 1 TABLET EVERY EVENING    Disp: 90 tablet    Refills: 3    Start: 1/20/2025    Class: Normal    Non-formulary    Last ordered: 9 months ago (4/25/2024) by Angelica Austin MD    Last refill: 12/2/2024    Rx #: [id number]    Hypertension Medications Protocol Srjden1401/20/2025 11:32 AM   Protocol Details Last BP reading less than 140/90    CMP or BMP in past 12 months    In person appointment or virtual visit in the past 12 mos or appointment in next 3 mos    EGFRCR or GFRNAA > 50    Medication is active on med list      To be filled at: [facility] [address] [facility] [facility] [phone], [phone]     LOV:12/11/2024  RTC:3 months   Labs:08/08/2024   Last filled:12/02/2024   Future Appointments   Date Time Provider Department Center   1/27/2025  8:45 AM Vanesa Mccord, PT [facility] [place]   1/30/2025  4:30 PM Vanesa Mccord, PT [facility] [place]   2/3/2025 10:15 AM Vanesa Mccord, PT [facility] [place]   2/6/2025 12:00 PM Vanesa Mccord, PT [facility] [place]   2/10/2025 10:15 AM Vanesa Mccord, PT [facility] [place]   2/12/2025 10:15 AM  Vanesa Mccord, PT [facility] [place]   2/24/2025  9:30 AM Angelica Austin MD [facility] [place]

## 2025-01-27 ENCOUNTER — OFFICE VISIT (OUTPATIENT)
Dept: PHYSICAL THERAPY | Age: 75
End: 2025-01-27
Attending: INTERNAL MEDICINE
Payer: MEDICARE

## 2025-01-27 DIAGNOSIS — M17.10 PRIMARY LOCALIZED OSTEOARTHRITIS OF KNEE: Primary | ICD-10-CM

## 2025-01-27 PROCEDURE — 97110 THERAPEUTIC EXERCISES: CPT

## 2025-01-27 PROCEDURE — 97162 PT EVAL MOD COMPLEX 30 MIN: CPT

## 2025-01-27 NOTE — PROGRESS NOTES
LOWER EXTREMITY EVALUATION:     Diagnosis:   Primary OA B knees Patient:  Javi Pratt (74 year old, female)        Referring Provider: Angelica Austin  Today's Date   1/27/2025    Precautions:  Cancer   Date of Evaluation: 01/27/25  Next MD visit: 2/24/2025 Dr Angelica Austin  Date of Surgery: None     PATIENT SUMMARY   Summary of chief complaints: Bilateral knee pain  History of current condition: Gradual worsening of knee pain over the past 2+ years, currently taking Meloxicam 1 x per day for pain. Pt lives alone in a house with stairs, she does drive, she has a support system that helps with grocery shopping and heavy housework.   Pain level: current 8 /10, at best 0 /10, at worst 10 /10  Description of symptoms: B knee pain, sharp pain with movement, very sore to touch on both knees at the joint. Pt does not wish to have cortisone injections or surgery.   Occupation: Homemaker   Leisure activities/Hobbies: Light house cleaning, cooking,visit friends   Prior level of function: Travel to [facility] in November 24', 1 yr ago able to walk 5-7 mins  Current limitations: Pain with walking in the grocery store, walking limited to 5-10 mins, stairs with great difficulty try only to go upstairs once per day holding railing, one step at a time. Pain on getting up after sitting 30 mins  Pt goals: To decrease knee pain and be able to walk easier  Past medical history was reviewed with Javi.  Significant findings include: Type 2 Diabetes, HTN, Breast Cancer, Osteopenia, GERD, Obesity  Imaging/Tests: None   Javi  has a past medical history of Breast CA (HCC) (1990), Breast cancer in female (HCC) (1990), Diabetes (HCC), Ductal carcinoma in situ of breast, Exposure to medical diagnostic radiation, High blood pressure, High cholesterol, Osteoarthritis, and Personal history of antineoplastic chemotherapy (1990).  She  has a past surgical history that includes radiation left (1990); chemotherapy (Left, 1990);  needle biopsy left (Left, ); lumpectomy left (Left, ); and mastectomy left (2020).    ASSESSMENT  Javi presents to physical therapy evaluation with primary c/o Bilateral knee pain. The results of the objective tests and measures show B knee varus deformity, severe DJD both knees limited flexion and extension, muscle weakness and impaired flexibility. Functional deficits include but are not limited to Pain with walking in the grocery store, walking limited to 5-10 mins, stairs with great difficulty try only to go upstairs once per day holding railing, one step at a time. Pain on getting up after sitting 30 mins. Signs and symptoms are consistent with diagnosis of Primary OA B knees. Pt and PT discussed evaluation findings, pathology, POC and HEP.  Pt voiced understanding and performs HEP correctly without reported pain. Skilled Physical Therapy is medically necessary to address the above impairments and reach functional goals.    OBJECTIVE:   Musculoskeletal  Observation: Obese female, wide stance  Palpation: Tenderness across jont line bilateral knees   Accessory Motion: Decreased knee extension and flexion bilateral knee joints. Decreased mobility of bilateral PF joints        OK ROM WNL and Strength (5/5) except below:  (* denotes performed with pain)  Knee   ROM MMT (-/5)    R L R L     Flex 105 105 3 3     Ext (L3) -20 -10 2+ 2+       Flexibility:  LE Flexibility R L     Hip Flexor moderate tightness moderate tightness     Hamstrings marked tightness marked tightness     ITB         Piriformis         Quads moderate tightness moderate tightness     Gastroc-soleus moderate tightness moderate tightness     Neurological:  Sensation: Intact     Balance and Functional Mobility:  Gait: pt ambulates on level ground with trendelenburg/waddle   Balance: SLS: R 0 sec,  L 0 sec  Functional Mobility:  5x sit to stand test: 30 sec   TU sec     Today's Treatment and Response:   Pt education was  provided on exam findings, treatment diagnosis, treatment plan, expectations, and prognosis.  Today's Treatment       1/27/2025   Treatment   Therapeutic Exercise Supine AAROM knee flexion, extension R/L x 10             - AAROM SAQ over roll R/L x 10             - manual hamstring, calf stretching R/L 3 x 20 sec hold   Manual Therapy STM lower leg R/L 5 mins   Patella mobilizations R/L 2 mins   Therapeutic Exercise Min 10   Manual Therapy Min 7   Total of Timed Procedures 17   Total of Service Based 0   Total Treatment Time 17         Patient was instructed in and issued a HEP for: Seated knee extension 10 x 5 sec hold R/L 2 sets, 2 x daily    Charges:  PT EVAL: Moderate Complexity, Ther ex 1  In agreement with evaluation findings and clinical rationale, this evaluation involved MODERATE COMPLEXITY decision making due to 1-2 personal factors/comorbidities, 3 or more body structures involved/activity limitations, and evolving symptoms as documented in the evaluation.                                                                                                                PLAN OF CARE:    Goals: (to be met in 8 visits)   Goals       Therapy Goals     Increase LE strength to 3/5 to enable pt to walk for 10 mins  Decrease pain with standing to 6/10 to enable pt to stand for 15 mins to prepare a small meal  Instruct pt in a home exercise program which pt can execute daily without increased pain              Frequency / Duration: Patient will be seen 2x/week or a total of 8  visits over a 90 day period. Treatment will include: Manual Therapy; Therapeutic Exercise; Home Exercise Program instruction    Education or treatment limitation: None   Rehab Potential: fair     LEFS Score  LEFS Score: (Patient-Rptd) 26.25 % (1/27/2025  8:49 AM)      Patient/Family/Caregiver was advised of these findings, precautions, and treatment options and has agreed to actively participate in planning and for this course of care.    Thank  you for your referral. Please co-sign or sign and return this letter via fax as soon as possible to [phone]. If you have any questions, please contact me at Dept: [phone]    Sincerely,  Electronically signed by therapist: Vanesa Mccord PT  Physician's certification required: Yes  I certify the need for these services furnished under this plan of treatment and while under my care.    X___________________________________________________ Date____________________    Certification From: 1/27/2025  To:4/27/2025

## 2025-01-29 ENCOUNTER — OFFICE VISIT (OUTPATIENT)
Dept: PHYSICAL THERAPY | Age: 75
End: 2025-01-29
Attending: INTERNAL MEDICINE
Payer: MEDICARE

## 2025-01-29 PROCEDURE — 97110 THERAPEUTIC EXERCISES: CPT

## 2025-01-29 NOTE — PROGRESS NOTES
Patient: Javi Pratt (74 year old, female) Referring Provider:  Insurance:   Diagnosis: Primary OA B knees Angelica EDWARDS   Date of Surgery: None Next MD visit:  N/A   Precautions:  Cancer 2/24/2025 Dr Angelica Austin Referral Information:    Date of Evaluation: Req: 8, Auth: 8, Exp: 3/27/2025    01/27/25 POC Auth Visits:  8       Today's Date   1/29/2025    Subjective  Pain and stiffness both knees, R worse than L. Difficulty with walking, stair climbing, standing all are limited by pain. Tightness in both calf muscles       Pain: 7/10     Objective  AROM R knee -20 - 105, AROM L knee -. Tenderness of calf muscles R>L. Pt arrived ambulating without assistive device, waddling, wide based gait pattern.              Assessment  Severe DJD of both knees, pt is not interested in jections or surgery. She would like to be able to stay as active as possible    Goals (to be met in 8 visits)   Goals       Therapy Goals     Increase LE strength to 3/5 to enable pt to walk for 10 mins  Decrease pain with standing to 6/10 to enable pt to stand for 15 mins to prepare a small meal  Instruct pt in a home exercise program which pt can execute daily without increased pain              Plan  Bilateral knee ROM, strengthening, hamstring stretches, STM knee and calf muscles    Treatment Last 4 Visits       1/27/2025 1/29/2025   Treatment   Treatment Day  2   Therapeutic Exercise Supine AAROM knee flexion, extension R/L x 10             - AAROM SAQ over roll R/L x 10             - manual hamstring, calf stretching R/L 3 x 20 sec hold Supine - AAROM knee flex, ext x 15 R/L              - SAQ over roll x 15 R/L              - hamstring stretch 30 sec hold x 2 R/L              - calf stretch ankle DF 30 sec hold x 2 R/L  Seated - Add squeeze with ball x 15              - Hip abd with red band x 15              - March 2 x 5 R/L              - LAQ x 15 R/L              - heel and toe raises x 15   Manual Therapy STM  lower leg R/L 5 mins   Patella mobilizations R/L 2 mins Supine STM knee and calf R 10 mins, L 5 mins   Therapeutic Exercise Min 10 30   Manual Therapy Min 7 15   Total of Timed Procedures 17 45   Total of Service Based 0 0   Total Treatment Time 17 45         HEP  Seated knee extension 10 x 5 sec hold R/L 2 sets, 2 x daily    Charges     EX 3

## 2025-01-30 ENCOUNTER — APPOINTMENT (OUTPATIENT)
Dept: PHYSICAL THERAPY | Age: 75
End: 2025-01-30
Attending: INTERNAL MEDICINE
Payer: MEDICARE

## 2025-02-03 ENCOUNTER — OFFICE VISIT (OUTPATIENT)
Dept: PHYSICAL THERAPY | Age: 75
End: 2025-02-03
Attending: INTERNAL MEDICINE
Payer: MEDICARE

## 2025-02-03 PROCEDURE — 97140 MANUAL THERAPY 1/> REGIONS: CPT

## 2025-02-03 PROCEDURE — 97110 THERAPEUTIC EXERCISES: CPT

## 2025-02-03 NOTE — PROGRESS NOTES
Patient: Javi Pratt (75 year old, female) Referring Provider:  Insurance:   Diagnosis: Primary OA B knees Angelica EDWARDS   Date of Surgery: None Next MD visit:  N/A   Precautions:  Cancer 2/24/2025 Dr Angelica Austin Referral Information:    Date of Evaluation: Req: 8, Auth: 8, Exp: 3/27/2025    01/27/25 POC Auth Visits:  8       Today's Date   2/3/2025    Subjective  Pain and stiffness both knees, R is worse. Able to stand for 10 mins, pain and tightness in the calf muscles.       Pain: 7/10     Objective  Varus deformity of both knee joints       AROM R knee flexion 105, L 110              R knee extension -20, L -10    Tenderness to palpation bilateral calf muscles     Assessment  Added core strengthening exercises today, pt continues to complain of severe knee pain with standing, walking, getting up from sitting    Goals (to be met in 8 visits)   Goals       Therapy Goals     Increase LE strength to 3/5 to enable pt to walk for 10 mins  Decrease pain with standing to 6/10 to enable pt to stand for 15 mins to prepare a small meal  Instruct pt in a home exercise program which pt can execute daily without increased pain              Plan  Bilateral knee ROM, strengthening, hamstring stretches, STM knee and calf muscles    Treatment Last 4 Visits       1/27/2025 1/29/2025 2/3/2025   Treatment   Treatment Day  2 3   Therapeutic Exercise Supine AAROM knee flexion, extension R/L x 10             - AAROM SAQ over roll R/L x 10             - manual hamstring, calf stretching R/L 3 x 20 sec hold Supine - AAROM knee flex, ext x 15 R/L              - SAQ over roll x 15 R/L              - hamstring stretch 30 sec hold x 2 R/L              - calf stretch ankle DF 30 sec hold x 2 R/L  Seated - Add squeeze with ball x 15              - Hip abd with red band x 15              - March 2 x 5 R/L              - LAQ x 15 R/L              - heel and toe raises x 15 Supine heel slides knee flex, ext R/L x 5              - SAQ over roll R/L x 10             - hamstring stretch R/L 30 sec hold x 2             - calf stretch R/L 30 sec hold  Hook lying bent leg march R/L x 10                   - bridging x 10                   - add squeeze with ball x 20                   - clam with green t band x 20  S lying clam R/L x 10             - hip flexor/quad stretch R/L 3 x 20 sec hold  Seated LAQ R/L x 10                                   Manual Therapy STM lower leg R/L 5 mins   Patella mobilizations R/L 2 mins Supine STM knee and calf R 10 mins, L 5 mins STM calf muscles R 5 mins, L 5 mins   Therapeutic Exercise Min 10 30 30   Manual Therapy Min 7 15 10   Other Timed Activity Min   5   Total of Timed Procedures 17 45 45   Total of Service Based 0 0 0   Total Treatment Time 17 45 45         HEP  HEP instruction and education 5 mins  - Hook lying bent leg march x 20  - Hook lying bridging 2 x 10  - Supine heel slides R/L x 10  - S lying clam R/L 2 x 10  - Seated LAQ x 20 R/L  - Sit to stand x 10      Charges     EX 2, MT 1

## 2025-02-06 ENCOUNTER — APPOINTMENT (OUTPATIENT)
Dept: PHYSICAL THERAPY | Age: 75
End: 2025-02-06
Attending: INTERNAL MEDICINE
Payer: MEDICARE

## 2025-02-06 ENCOUNTER — OFFICE VISIT (OUTPATIENT)
Dept: PHYSICAL THERAPY | Age: 75
End: 2025-02-06
Attending: INTERNAL MEDICINE
Payer: MEDICARE

## 2025-02-06 PROCEDURE — 97110 THERAPEUTIC EXERCISES: CPT

## 2025-02-06 PROCEDURE — 97140 MANUAL THERAPY 1/> REGIONS: CPT

## 2025-02-07 NOTE — PROGRESS NOTES
Patient: Javi Pratt (75 year old, female) Referring Provider:  Insurance:   Diagnosis: Primary OA B knees Angelica EDWARDS   Date of Surgery: None Next MD visit:  N/A   Precautions:  Cancer 2/24/2025 Dr Angelica Austin Referral Information:    Date of Evaluation: Req: 8, Auth: 8, Exp: 3/27/2025    01/27/25 POC Auth Visits:  8       Today's Date   2/6/2025    Subjective  Yesterday it was really hard to walk in the morning, I massaged the knees with Voltaren gel and after that it was a little better.       Pain: 7/10 (8/10 at worst)     Objective  Overweight female, varus deformity B knee joints. ROM knee flexion R 110, L 105         Assessment  Review of HEP pt is performing exercises with good form.    Goals (to be met in 8 visits)   Goals       Therapy Goals     Increase LE strength to 3/5 to enable pt to walk for 10 mins  Decrease pain with standing to 6/10 to enable pt to stand for 15 mins to prepare a small meal  Instruct pt in a home exercise program which pt can execute daily without increased pain              Plan  LE strengthening, LE flexibility, knee mobilizations to increase flexion and extension, STM calf muscles to decrease tension and soreness    Treatment Last 4 Visits       1/27/2025 1/29/2025 2/3/2025 2/6/2025   PT Treatment   Treatment Day  2 3 4   Therapeutic Exercise Supine AAROM knee flexion, extension R/L x 10             - AAROM SAQ over roll R/L x 10             - manual hamstring, calf stretching R/L 3 x 20 sec hold Supine - AAROM knee flex, ext x 15 R/L              - SAQ over roll x 15 R/L              - hamstring stretch 30 sec hold x 2 R/L              - calf stretch ankle DF 30 sec hold x 2 R/L  Seated - Add squeeze with ball x 15              - Hip abd with red band x 15              - March 2 x 5 R/L              - LAQ x 15 R/L              - heel and toe raises x 15 Supine heel slides knee flex, ext R/L x 5             - SAQ over roll R/L x 10             - hamstring  stretch R/L 30 sec hold x 2             - calf stretch R/L 30 sec hold  Hook lying bent leg march R/L x 10                   - bridging x 10                   - add squeeze with ball x 20                   - clam with green t band x 20  S lying clam R/L x 10             - hip flexor/quad stretch R/L 3 x 20 sec hold  Seated LAQ R/L x 10                                 Hook lying clam with green t band x 20                   - add squeeze with ball x 20                   - bent leg march R/L x 10                   - bent leg fallout R/L x 10                   - hamstring stretch R/L 30 sec hold  Supine passive knee flex, ext R/L x 10  SAQ over roll R/L x 20  S Lying clam R/L x 20  S Lying passive hip flexor/quad stretch R/L 3 x 10 sec hold  Seated LAQ R/L x 20  Seated hamstring curls with green t band R/L x 20     Manual Therapy STM lower leg R/L 5 mins   Patella mobilizations R/L 2 mins Supine STM knee and calf R 10 mins, L 5 mins STM calf muscles R 5 mins, L 5 mins STM calf and lower leg muscles R 5 mins, L 5 mins   Therapeutic Exercise Min 10 30 30 30   Manual Therapy Min 7 15 10 10   Other Timed Activity Min   5    Total of Timed Procedures 17 45 45 40   Total of Service Based 0 0 0 0   Total Treatment Time 17 45 45 40         HEP    - Hook lying bent leg march x 20  - Hook lying bridging 2 x 10  - Supine heel slides R/L x 10  - S lying clam R/L 2 x 10  - Seated LAQ x 20 R/L  - Sit to stand x 10         Charges     EX 2, MT 1

## 2025-02-10 ENCOUNTER — OFFICE VISIT (OUTPATIENT)
Dept: PHYSICAL THERAPY | Age: 75
End: 2025-02-10
Attending: INTERNAL MEDICINE
Payer: MEDICARE

## 2025-02-10 PROCEDURE — 97110 THERAPEUTIC EXERCISES: CPT

## 2025-02-10 PROCEDURE — 97140 MANUAL THERAPY 1/> REGIONS: CPT

## 2025-02-10 RX ORDER — CALCIUM CITRATE/VITAMIN D3 200MG-6.25
TABLET ORAL
Qty: 100 STRIP | Refills: 2 | Status: SHIPPED | OUTPATIENT
Start: 2025-02-10

## 2025-02-10 NOTE — PROGRESS NOTES
Patient: Javi Pratt (75 year old, female) Referring Provider:  Insurance:   Diagnosis: Primary OA B knees Angelica EDWARDS   Date of Surgery: None Next MD visit:  N/A   Precautions:  Cancer 2/24/2025 Dr Angelica Austin Referral Information:    Date of Evaluation: Req: 8, Auth: 8, Exp: 3/27/2025    01/27/25 POC Auth Visits:  8       Today's Date   2/10/2025    Subjective  This weekend I had sharp pain in the back and dull pain in the R leg, I did not do much over the weekend. Ihave pain when I am lying down to sleep and when I am standing or walking.       Pain: 8/10     Objective  Overweight female, varus deformity B knee joints. ROM knee flexion R 110, L 105          AROM R knee flexion 105, L 110              R knee extension -20, L -10    Tenderness to palpation bilateral calf muscles      Assessment  Pt is not performing her home exercises on a daily basis. Issued handouts with instructions to perform at least 20 reps 1-2 x per day for strengthening.    Goals (to be met in 8 visits)   Goals       Therapy Goals     Increase LE strength to 3/5 to enable pt to walk for 10 mins- in progress  Decrease pain with standing to 6/10 to enable pt to stand for 15 mins to prepare a small meal - in progress  Instruct pt in a home exercise program which pt can execute daily without increased pain - in progress              Plan  Continue PT x 3 visits    Treatment Last 4 Visits       1/29/2025 2/3/2025 2/6/2025 2/10/2025   PT Treatment   Treatment Day 2 3 4 5   Therapeutic Exercise Supine - AAROM knee flex, ext x 15 R/L              - SAQ over roll x 15 R/L              - hamstring stretch 30 sec hold x 2 R/L              - calf stretch ankle DF 30 sec hold x 2 R/L  Seated - Add squeeze with ball x 15              - Hip abd with red band x 15              - March 2 x 5 R/L              - LAQ x 15 R/L              - heel and toe raises x 15 Supine heel slides knee flex, ext R/L x 5             - SAQ over roll R/L  x 10             - hamstring stretch R/L 30 sec hold x 2             - calf stretch R/L 30 sec hold  Hook lying bent leg march R/L x 10                   - bridging x 10                   - add squeeze with ball x 20                   - clam with green t band x 20  S lying clam R/L x 10             - hip flexor/quad stretch R/L 3 x 20 sec hold  Seated LAQ R/L x 10                                 Hook lying clam with green t band x 20                   - add squeeze with ball x 20                   - bent leg march R/L x 10                   - bent leg fallout R/L x 10                   - hamstring stretch R/L 30 sec hold  Supine passive knee flex, ext R/L x 10  SAQ over roll R/L x 20  S Lying clam R/L x 20  S Lying passive hip flexor/quad stretch R/L 3 x 10 sec hold  Seated LAQ R/L x 20  Seated hamstring curls with green t band R/L x 20   Supine - AAROM hip/knee flexion extension R/L x 20              - AROM ankle PF/DF R/L x 20              - SAQ over roll x 20 with sec hold R/L  Hook lying - add squeeze with ball x 20 3 sec hold                    - clam with green t band x 20 3 sec hold                    - bent leg march R/L x 10  Manual hamstring stretch 30 sec hold x 2 R/L  S Lying clam x 20 R/L  Seated ankle PF with green t band x 20 R/L   Neuro Re-Ed    Hook lying abd bracing 10 x 3 sec hold   Manual Therapy Supine STM knee and calf R 10 mins, L 5 mins STM calf muscles R 5 mins, L 5 mins STM calf and lower leg muscles R 5 mins, L 5 mins STM lateral hip, glute and calf muscle R 5 min, L 5 mins  Supine patella mobilizations R 2 mins, L 2 mins   Therapeutic Exercise Min 30 30 30 30   Manual Therapy Min 15 10 10 15   Other Timed Activity Min  5     Total of Timed Procedures 45 45 40 45   Total of Service Based 0 0 0 0   Total Treatment Time 45 45 40 45         HEP  Hook lying - bent leg march x 20                    - clam with green t band x 20  S Lying clam x 20  Seated LAQ x 20      Charges     EX 2, MT  1

## 2025-02-12 ENCOUNTER — OFFICE VISIT (OUTPATIENT)
Dept: PHYSICAL THERAPY | Age: 75
End: 2025-02-12
Attending: INTERNAL MEDICINE
Payer: MEDICARE

## 2025-02-12 PROCEDURE — 97110 THERAPEUTIC EXERCISES: CPT

## 2025-02-12 PROCEDURE — 97140 MANUAL THERAPY 1/> REGIONS: CPT

## 2025-02-12 NOTE — PROGRESS NOTES
Patient: Javi Pratt (75 year old, female) Referring Provider:  Insurance:   Diagnosis: Primary OA B knees Angelica EDWARDS   Date of Surgery: None Next MD visit:  N/A   Precautions:  Cancer 2/24/2025 Dr Angelica Austin Referral Information:    Date of Evaluation: Req: 8, Auth: 8, Exp: 3/27/2025    01/27/25 POC Auth Visits:  8       Today's Date   2/12/2025    Subjective  The R knee continues to feel more painful than the L, walking and steps are difficult. Taking Meloxicam daily for pain. Calf muscles are tender and tight, worse in the morning when I first get up to walk and after sitting for a while both calf muscles and knees are painful.       Pain: 6/10     Objective  Overweight female, varus deformity B knee joints. ROM knee flexion R 110, L 105       Tenderness to palpation calf muscles R>L         Assessment  Severe DJD of both knees R>L, crepitus with active and passive motion. Pt was able to demonstrate HEP exercises today with fair form.    Goals (to be met in 8 visits)   Goals       Therapy Goals     Increase LE strength to 3/5 to enable pt to walk for 10 mins- in progress  Decrease pain with standing to 6/10 to enable pt to stand for 15 mins to prepare a small meal - in progress  Instruct pt in a home exercise program which pt can execute daily without increased pain - in progress              Plan  Knee joint ROM, LE strengthening, LE stretching, calf massage. HEP instruction.    Treatment Last 4 Visits       2/3/2025 2/6/2025 2/10/2025 2/12/2025   PT Treatment   Treatment Day 3 4 5 6   Therapeutic Exercise Supine heel slides knee flex, ext R/L x 5             - SAQ over roll R/L x 10             - hamstring stretch R/L 30 sec hold x 2             - calf stretch R/L 30 sec hold  Hook lying bent leg march R/L x 10                   - bridging x 10                   - add squeeze with ball x 20                   - clam with green t band x 20  S lying clam R/L x 10             - hip  flexor/quad stretch R/L 3 x 20 sec hold  Seated LAQ R/L x 10                                 Hook lying clam with green t band x 20                   - add squeeze with ball x 20                   - bent leg march R/L x 10                   - bent leg fallout R/L x 10                   - hamstring stretch R/L 30 sec hold  Supine passive knee flex, ext R/L x 10  SAQ over roll R/L x 20  S Lying clam R/L x 20  S Lying passive hip flexor/quad stretch R/L 3 x 10 sec hold  Seated LAQ R/L x 20  Seated hamstring curls with green t band R/L x 20   Supine - AAROM hip/knee flexion extension R/L x 20              - AROM ankle PF/DF R/L x 20              - SAQ over roll x 20 with sec hold R/L  Hook lying - add squeeze with ball x 20 3 sec hold                    - clam with green t band x 20 3 sec hold                    - bent leg march R/L x 10  Manual hamstring stretch 30 sec hold x 2 R/L  S Lying clam x 20 R/L  Seated ankle PF with green t band x 20 R/L Supine - AAROM hip/knee flexion extension R/L x 20              - AROM ankle PF/DF R/L x 20              - SAQ over roll x 20 with 3 sec hold R/L  Hook lying - add squeeze with ball x 20 3 sec hold                    - clam with green t band x 20 3 sec hold                      Manual hamstring stretch 30 sec hold x 2 R/L  S Lying clam x 20 R/L  Seated ankle PF with green t band x 20 R/L             - knee flexion R/L x 20  Sit to stand x 5     Neuro Re-Ed   Hook lying abd bracing 10 x 3 sec hold Hook lying abd bracing bent leg march x 20                   - bridge with abd bracing x 20   Manual Therapy STM calf muscles R 5 mins, L 5 mins STM calf and lower leg muscles R 5 mins, L 5 mins STM lateral hip, glute and calf muscle R 5 min, L 5 mins  Supine patella mobilizations R 2 mins, L 2 mins STM lateral hip, glute and calf muscle R 5 min, L 5 mins       Therapeutic Exercise Min 30 30 30 30   Neuro Re-Ed Min    5   Manual Therapy Min 10 10 15 10   Other Timed Activity Min 5       Total of Timed Procedures 45 40 45 45   Total of Service Based 0 0 0 0   Total Treatment Time 45 40 45 45         HEP  Hook lying - bent leg march x 20                    - clam with green t band x 20  S Lying clam x 20  Seated LAQ x 20         Charges     EX 2, MT 1

## 2025-02-19 ENCOUNTER — APPOINTMENT (OUTPATIENT)
Dept: PHYSICAL THERAPY | Age: 75
End: 2025-02-19
Attending: INTERNAL MEDICINE
Payer: MEDICARE

## 2025-02-19 ENCOUNTER — OFFICE VISIT (OUTPATIENT)
Dept: PHYSICAL THERAPY | Age: 75
End: 2025-02-19
Attending: INTERNAL MEDICINE
Payer: MEDICARE

## 2025-02-19 PROCEDURE — 97140 MANUAL THERAPY 1/> REGIONS: CPT

## 2025-02-19 PROCEDURE — 97110 THERAPEUTIC EXERCISES: CPT

## 2025-02-19 NOTE — PROGRESS NOTES
Patient: Javi Pratt (75 year old, female) Referring Provider:  Insurance:   Diagnosis: Primary OA B knees Angelica EDWARDS   Date of Surgery: None Next MD visit:  N/A   Precautions:  Cancer 2/24/2025 Dr Angelica Austin Referral Information:    Date of Evaluation: Req: 8, Auth: 8, Exp: 3/27/2025    01/27/25 POC Auth Visits:  8       Today's Date   2/19/2025    Subjective  Pain in both knees, the R one is worse with walking and standing. Doing the exercises at home most days.       Pain: 6/10     Objective  Severe varus deformity of the knees, R > L. Crepitus and pain with active and oassive motion of the R knee. Antalgic gait with trendelenburg due to knee deformity/limb length difference         Assessment  Minimal improvement to date due to severity of DJD in both knees    Goals (to be met in 8 visits)   Goals       Therapy Goals     Increase LE strength to 3/5 to enable pt to walk for 10 mins- in progress  Decrease pain with standing to 6/10 to enable pt to stand for 15 mins to prepare a small meal - in progress  Instruct pt in a home exercise program which pt can execute daily without increased pain - in progress              Plan  Continue PT x 1 and discherge to continue independent HEP    Treatment Last 4 Visits       2/6/2025 2/10/2025 2/12/2025 2/19/2025   PT Treatment   Treatment Day 4 5 6 7   Therapeutic Exercise Hook lying clam with green t band x 20                   - add squeeze with ball x 20                   - bent leg march R/L x 10                   - bent leg fallout R/L x 10                   - hamstring stretch R/L 30 sec hold  Supine passive knee flex, ext R/L x 10  SAQ over roll R/L x 20  S Lying clam R/L x 20  S Lying passive hip flexor/quad stretch R/L 3 x 10 sec hold  Seated LAQ R/L x 20  Seated hamstring curls with green t band R/L x 20   Supine - AAROM hip/knee flexion extension R/L x 20              - AROM ankle PF/DF R/L x 20              - SAQ over roll x 20 with sec  hold R/L  Hook lying - add squeeze with ball x 20 3 sec hold                    - clam with green t band x 20 3 sec hold                    - bent leg march R/L x 10  Manual hamstring stretch 30 sec hold x 2 R/L  S Lying clam x 20 R/L  Seated ankle PF with green t band x 20 R/L Supine - AAROM hip/knee flexion extension R/L x 20              - AROM ankle PF/DF R/L x 20              - SAQ over roll x 20 with 3 sec hold R/L  Hook lying - add squeeze with ball x 20 3 sec hold                    - clam with green t band x 20 3 sec hold                      Manual hamstring stretch 30 sec hold x 2 R/L  S Lying clam x 20 R/L  Seated ankle PF with green t band x 20 R/L             - knee flexion R/L x 20  Sit to stand x 5   Supine - AAROM hip/knee flexion extension R/L x 20              - AROM ankle PF/DF R/L x 20              - SAQ over roll x 20 with 3 sec hold R/L  Hook lying - add squeeze with ball x 20 3 sec hold                    - clam with blue t band x 20 3 sec hold                      Manual hamstring stretch 30 sec hold x 2 R/L  S Lying clam x 20 R/L  Seated ankle PF with green t band x 20 R/L             - knee flexion R/L x 20  Sit to stand x 5     Neuro Re-Ed  Hook lying abd bracing 10 x 3 sec hold Hook lying abd bracing bent leg march x 20                   - bridge with abd bracing x 20    Manual Therapy STM calf and lower leg muscles R 5 mins, L 5 mins STM lateral hip, glute and calf muscle R 5 min, L 5 mins  Supine patella mobilizations R 2 mins, L 2 mins STM lateral hip, glute and calf muscle R 5 min, L 5 mins     STM R knee and calf 6 mins  STM L knee and calf 6 mins   Therapeutic Exercise Min 30 30 30 33   Neuro Re-Ed Min   5    Manual Therapy Min 10 15 10 12   Total of Timed Procedures 40 45 45 45   Total of Service Based 0 0 0 0   Total Treatment Time 40 45 45 45         HEP    Hook lying - bent leg march x 20                    - clam with green t band x 20  S Lying clam x 20  Seated LAQ x  20      Charges     EX 2, MT 1

## 2025-02-24 ENCOUNTER — OFFICE VISIT (OUTPATIENT)
Dept: INTERNAL MEDICINE CLINIC | Facility: CLINIC | Age: 75
End: 2025-02-24
Payer: MEDICARE

## 2025-02-24 ENCOUNTER — APPOINTMENT (OUTPATIENT)
Dept: PHYSICAL THERAPY | Age: 75
End: 2025-02-24
Attending: INTERNAL MEDICINE
Payer: MEDICARE

## 2025-02-24 VITALS
BODY MASS INDEX: 34.59 KG/M2 | TEMPERATURE: 98 F | WEIGHT: 176.19 LBS | SYSTOLIC BLOOD PRESSURE: 180 MMHG | HEIGHT: 60 IN | HEART RATE: 100 BPM | DIASTOLIC BLOOD PRESSURE: 70 MMHG | OXYGEN SATURATION: 98 %

## 2025-02-24 DIAGNOSIS — Z12.31 ENCOUNTER FOR SCREENING MAMMOGRAM FOR MALIGNANT NEOPLASM OF BREAST: ICD-10-CM

## 2025-02-24 DIAGNOSIS — E78.5 HYPERLIPIDEMIA ASSOCIATED WITH TYPE 2 DIABETES MELLITUS (HCC): ICD-10-CM

## 2025-02-24 DIAGNOSIS — R06.09 DOE (DYSPNEA ON EXERTION): ICD-10-CM

## 2025-02-24 DIAGNOSIS — E66.9 TYPE 2 DIABETES MELLITUS WITH OBESITY (HCC): ICD-10-CM

## 2025-02-24 DIAGNOSIS — E11.59 HYPERTENSION ASSOCIATED WITH DIABETES (HCC): ICD-10-CM

## 2025-02-24 DIAGNOSIS — G89.29 CHRONIC MIDLINE LOW BACK PAIN WITHOUT SCIATICA: ICD-10-CM

## 2025-02-24 DIAGNOSIS — M17.10 PRIMARY LOCALIZED OSTEOARTHRITIS OF KNEE: ICD-10-CM

## 2025-02-24 DIAGNOSIS — Z90.12 H/O LEFT MASTECTOMY: ICD-10-CM

## 2025-02-24 DIAGNOSIS — E11.69 HYPERLIPIDEMIA ASSOCIATED WITH TYPE 2 DIABETES MELLITUS (HCC): ICD-10-CM

## 2025-02-24 DIAGNOSIS — Z00.00 ROUTINE GENERAL MEDICAL EXAMINATION AT A HEALTH CARE FACILITY: Primary | ICD-10-CM

## 2025-02-24 DIAGNOSIS — M54.50 CHRONIC MIDLINE LOW BACK PAIN WITHOUT SCIATICA: ICD-10-CM

## 2025-02-24 DIAGNOSIS — E11.69 TYPE 2 DIABETES MELLITUS WITH OBESITY (HCC): ICD-10-CM

## 2025-02-24 DIAGNOSIS — Z12.11 SCREEN FOR COLON CANCER: ICD-10-CM

## 2025-02-24 DIAGNOSIS — C50.912 INVASIVE DUCTAL CARCINOMA OF BREAST, FEMALE, LEFT (HCC): ICD-10-CM

## 2025-02-24 DIAGNOSIS — E11.9 TYPE 2 DIABETES MELLITUS WITHOUT COMPLICATION, WITHOUT LONG-TERM CURRENT USE OF INSULIN (HCC): ICD-10-CM

## 2025-02-24 DIAGNOSIS — I10 WHITE COAT SYNDROME WITH DIAGNOSIS OF HYPERTENSION: ICD-10-CM

## 2025-02-24 DIAGNOSIS — M85.88 OSTEOPENIA OF LUMBAR SPINE: ICD-10-CM

## 2025-02-24 DIAGNOSIS — I15.2 HYPERTENSION ASSOCIATED WITH DIABETES (HCC): ICD-10-CM

## 2025-02-24 PROBLEM — E11.22 CKD STAGE 3 DUE TO TYPE 2 DIABETES MELLITUS (HCC): Status: RESOLVED | Noted: 2024-02-28 | Resolved: 2025-02-24

## 2025-02-24 PROBLEM — N18.30 CKD STAGE 3 DUE TO TYPE 2 DIABETES MELLITUS (HCC): Status: RESOLVED | Noted: 2024-02-28 | Resolved: 2025-02-24

## 2025-02-24 RX ORDER — OMEPRAZOLE 20 MG/1
20 CAPSULE, DELAYED RELEASE ORAL EVERY MORNING
COMMUNITY
Start: 2025-01-21

## 2025-02-24 NOTE — PATIENT INSTRUCTIONS
You are due for your labs and urine testing at the end of March, 2025.     Please do not exceed 3000 mg (3 grams) of tylenol in 24 hours.   It is very important you look at the amount of tylenol (acetaminophen) in any of your over the counter medications to ensure you do not exceed a safe amount of tylenol per day.    Please measure your blood pressure and pulse daily and record these values. Please measure your blood pressure once daily after you have been resting for at least 5 minutes. Both feet should be flat on the ground and you should be seated with your back supported. Please communicate your blood pressures to me in 14 days.     You are due for your diabetic eye exam and follow-up for breast cancer in March, 2025.     You are due for your right breast mammogram in July,2025.     I recommend the following vaccines -     Shingrix vaccine once in a lifetime. It is a two step vaccine given 2-6 months apart.     RSV vaccine for everyone over age 75 and those ages 60-74 with chronic heart, lung, kidney and liver conditions.     Annual FLU every September, October.    Stay up to date with COVID vaccines.

## 2025-02-24 NOTE — PROGRESS NOTES
Javi Pratt is a 75 year old female.     HPI:   Patient presents for the following issues and MAS.  Breast cancer - tolerating anastrazole. Needs to schedule oncology f/u.   HTN - she is not measure pressures at home.   HLP - tolerating statin therapy.   Weight management - sedentary. She is improving her nutrition more recently.   DM - she is checking her FBS 2 times/week. They are 130-160s. Denies hypoglycemia. She states she has not changed her nutrition but she has been much less active since her  passed in 12/2023 and also due to her knee pain.   Screen for colon cancer - she is willing to do FIT.   Vaccines - discussed  Quality of life, goals of care - she is living alone. She is struggling with housekeeping. She manages her own finances, independent of her ADLs. Her friends are helpful. Her brother is also supportive. She is requesting HH  Bilateral knee pain - she has been participating in PT since 12/2024. Initially her pain level was 9/10 and now it is 7/10. She uses meloxicam every other day and it is helping.   DOWNS - started a few years ago. It is worsening over past 6 months.  Denies chest tightness, wheezing, or chest pain. She has to pause /sit for 4 minutes until she feels  her breathing improves. She admits she is very sedentary at home.     REVIEW OF SYSTEMS:   GENERAL HEALTH: feels well otherwise. No f/c  NEURO: denies any headaches, LH, dizzyness, LOC, falls  VISION: denies any blurred or double vision  RESPIRATORY: DOWNS as above. No coughing.   CARDIOVASCULAR: denies chest pain, pressure or palpitations  GI: denies abdominal pain, constipation, diarrhea, n/v, BRBPR, melena  : no dysuria or hematuria or vaginal bleeding  SKIN: denies any unusual skin lesions or rashes  PSYCH: mood is stable. Denies depression or anxiety.   EXT: denies edema         Wt Readings from Last 6 Encounters:   02/24/25 176 lb 3.2 oz (79.9 kg)   12/11/24 176 lb 3.2 oz (79.9 kg)   08/28/24 174 lb 3.2 oz  (79 kg)   03/15/24 169 lb (76.7 kg)   02/28/24 163 lb (73.9 kg)   06/07/23 163 lb 12.8 oz (74.3 kg)       No Known Allergies    Family History   Problem Relation Age of Onset    Cancer Father 70        \"Blood cancer\"    Diabetes Mother         with ESRD.    Diabetes Brother     Breast Cancer Self         49    DCIS Self       Past Medical History:    Breast CA (HCC)    left breast    Breast cancer in female (HCC)    s/p chemo and radiation    Diabetes (HCC)    Ductal carcinoma in situ of breast    Exposure to medical diagnostic radiation    High blood pressure    High cholesterol    Osteoarthritis    Personal history of antineoplastic chemotherapy      Past Surgical History:   Procedure Laterality Date    Chemotherapy Left 1990    6 months    Lumpectomy left Left 1990    breast cancer with chemo/radiation    Mastectomy left  12/2020    IDC, DCIS    Needle biopsy left Left 1990    Radiation left  1990 1 1/2 months      Social History:    Social History     Socioeconomic History    Marital status:    Tobacco Use    Smoking status: Never    Smokeless tobacco: Never   Vaping Use    Vaping status: Never Used   Substance and Sexual Activity    Alcohol use: No     Alcohol/week: 0.0 standard drinks of alcohol    Drug use: No   Other Topics Concern    Caffeine Concern No    Exercise Yes    Seat Belt Yes    Special Diet No    Stress Concern No    Weight Concern Yes   Social History Narrative    ** Merged History Encounter **                  EXAM:   BP (!) 180/70 (BP Location: Right arm, Patient Position: Sitting, Cuff Size: adult)   Pulse 100   Temp 97.8 °F (36.6 °C) (Temporal)   Ht 5' (1.524 m)   Wt 176 lb 3.2 oz (79.9 kg)   SpO2 98%   BMI 34.41 kg/m²   GENERAL: A&O well developed, well nourished,in no apparent distress  SKIN: no rashes,no suspicious lesions  HEENT: atraumatic, MMM, throat is clear  NECK: supple, no jvd, no thyromegaly  LUNGS: clear to auscultation bilateraly, no c/w/r  CARDIO: RRR without  g/m/r  GI: soft non tender nondistended no hsm bs throughout  NEURO: CN 2-12 grossly intact  PSYCH: pleasant  EXTREMITIES: no cyanosis, clubbing or edema  Bilateral barefoot skin diabetic exam - dry, cracked skin visualized feet and the appearance is normal.   Bilateral monofilament/sensation of both feet is normal.  I was not able to palpate pedal pulse of either feet but they are both warm, well perfused and capillary refill less than 3 seconds.       ASSESSMENT AND PLAN:   # DOWNS - needs cardio-pulmonary evaluation. Start with stress test and echo. She is not able to walk on treadmill.   # Bilateral Knee Pain 2/2 OA: worse in right knee. Only slightly improving with PT, HEP and meloxicam. X-rays and ortho referral ordered.   # HTN associated with DM, white coat syndrome: uncontrolled and needs to communicate home pressures to me. She is using meloxicam every other day.  Home machine is accurate. Continue irbesartan.   # Left Breast Cancer, s/p left mastectomy: previous left breast carcinoma 1990, status post lumpectomy/RT/6 months of chemo/5 years of AI.  Recurrent left breast carcinoma diagnosed after abnormal screening mammogram 10/2020. - Started anastrozole January 2021, continue until January, 2026    # HLP associated with DM: at goal on atorvastatin.   # Type 2 Diabetes in Obese, BMI 32: at goal in 12/2024.   - continue to reinforce importance of weight loss, carb controlled diet, and exercise.   - Cont metformin 500 mg once daily. Higher dose causes abdominal pain.   - DM Eye Exam - done on 4/2024 by Adam Mason. 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 Continue daily HEP.   # GERD: has been told she has duodenal erythema on past EGDs in [place].   # Osteopenia of lumbar spine - low FRAX per DEXA in 2023.  educated on dietary calcium/vit D3, weight bearing exercises, fall prevention.   # Health Maintenance: medicare supervisit on February, 2025  Colon Cancer Screening: Cont to decline c-scope, FIT or cologuard.   Breast Cancer Screening: continue yearly mammogram.   Bone Health: see above  Vaccines: Up to date on pneumovac 23 and prevnar 13. TDAP 2017. Advised on shingrix, rsv, flu, and covid.   Medical POA: Brother, William Pratt  Hepatitis C Screen: AB neg 2019       The patient indicates understanding of these issues and agrees to the plan.  The patient is asked to return in 3 months for close f/u of multilpe isseus.   Angelica Austin MD

## 2025-02-26 ENCOUNTER — TELEPHONE (OUTPATIENT)
Dept: INTERNAL MEDICINE CLINIC | Facility: CLINIC | Age: 75
End: 2025-02-26

## 2025-02-26 NOTE — TELEPHONE ENCOUNTER
Bekah from [facility] called to update at the pts request pt did not wish to start her visits until 3/3, as she is still doing outpatient therapy.    Pt is requesting  to evaluate needs for a  needs which would need to be included on order/s.    Bekah from [facility]  p [phone]  f [phone]

## 2025-02-27 ENCOUNTER — HOSPITAL ENCOUNTER (OUTPATIENT)
Dept: GENERAL RADIOLOGY | Age: 75
Discharge: HOME | End: 2025-02-27
Attending: INTERNAL MEDICINE
Payer: MEDICARE

## 2025-02-27 ENCOUNTER — OFFICE VISIT (OUTPATIENT)
Dept: PHYSICAL THERAPY | Age: 75
End: 2025-02-27
Attending: INTERNAL MEDICINE
Payer: MEDICARE

## 2025-02-27 DIAGNOSIS — M17.10 PRIMARY LOCALIZED OSTEOARTHRITIS OF KNEE: ICD-10-CM

## 2025-02-27 PROCEDURE — 73564 X-RAY EXAM KNEE 4 OR MORE: CPT | Performed by: INTERNAL MEDICINE

## 2025-02-27 PROCEDURE — 97140 MANUAL THERAPY 1/> REGIONS: CPT

## 2025-02-27 PROCEDURE — 97110 THERAPEUTIC EXERCISES: CPT

## 2025-02-27 NOTE — TELEPHONE ENCOUNTER
Spoke with Bekah from [facility] to clarify if pt needs a verbal order for social work or if it's an update.     KS: Bekah [facility] RN requesting for verbal OK for pt to see  for  needs. OK to give verbal?

## 2025-02-27 NOTE — PROGRESS NOTES
Patient: Javi Pratt (75 year old, female) Referring Provider:  Insurance:   Diagnosis: Primary OA B knees Angelica EDWARDS   Date of Surgery: None Next MD visit:  N/A   Precautions:  Cancer 2/24/2025 Dr Angelica Austin Referral Information:    Date of Evaluation: Req: 8, Auth: 8, Exp: 3/27/2025    01/27/25 POC Auth Visits:  8       Today's Date   2/27/2025    Discharge Note: Pt has completed 8 sessions of PT. She will continue with her home program at this time.     Subjective  Severe pain R knee, worse today after going for a pedicure yesterday. My back is also hurting from sitting in the chair for 1 hour at the nail salon. I had x rays on both knees yesterday and I will be following up with the Orthopedic Dr to discuss the next steps.       Pain: 8/10     Objective  Severe varus deformity of the knees, R > L. Crepitus and pain with active and oassive motion of the R knee. Antalgic gait with trendelenburg due to knee deformity/limb length difference          Assessment  Minimal improvement to date due to severity of DJD in both knees  LEFS post score 30    Goals (to be met in 8 visits)   Goals       Therapy Goals     Increase LE strength to 3/5 to enable pt to walk for 10 mins- not met  Decrease pain with standing to 6/10 to enable pt to stand for 15 mins to prepare a small meal -partially met  Instruct pt in a home exercise program which pt can execute daily without increased pain -met              Plan  Discharge to HEP    Treatment Last 4 Visits       2/10/2025 2/12/2025 2/19/2025 2/27/2025   PT Treatment   Treatment Day 5 6 7 8   Therapeutic Exercise Supine - AAROM hip/knee flexion extension R/L x 20              - AROM ankle PF/DF R/L x 20              - SAQ over roll x 20 with sec hold R/L  Hook lying - add squeeze with ball x 20 3 sec hold                    - clam with green t band x 20 3 sec hold                    - bent leg march R/L x 10  Manual hamstring stretch 30 sec hold x 2 R/L  S  Lying clam x 20 R/L  Seated ankle PF with green t band x 20 R/L Supine - AAROM hip/knee flexion extension R/L x 20              - AROM ankle PF/DF R/L x 20              - SAQ over roll x 20 with 3 sec hold R/L  Hook lying - add squeeze with ball x 20 3 sec hold                    - clam with green t band x 20 3 sec hold                      Manual hamstring stretch 30 sec hold x 2 R/L  S Lying clam x 20 R/L  Seated ankle PF with green t band x 20 R/L             - knee flexion R/L x 20  Sit to stand x 5   Supine - AAROM hip/knee flexion extension R/L x 20              - AROM ankle PF/DF R/L x 20              - SAQ over roll x 20 with 3 sec hold R/L  Hook lying - add squeeze with ball x 20 3 sec hold                    - clam with blue t band x 20 3 sec hold                      Manual hamstring stretch 30 sec hold x 2 R/L  S Lying clam x 20 R/L  Seated ankle PF with green t band x 20 R/L             - knee flexion R/L x 20  Sit to stand x 5   S Lying clam 2 x 10 R/L  Hook lying clam with blue t band x 20                    Add squeeze with ball x 20  Supine AAROM knee flex/ext x 10 R/L              SAQ over roll AAROM 2 x 10 R/L              Hamstring stretch 30 sec hold R/L              Ankle DF stretch 30 sec hold R/L  Hook lying bridging x 10                   Bent leg march x 20  Seated AAROM knee ext x 10 R/L              Hamstring curls with green t band x 20 R/L   Neuro Re-Ed Hook lying abd bracing 10 x 3 sec hold Hook lying abd bracing bent leg march x 20                   - bridge with abd bracing x 20     Manual Therapy STM lateral hip, glute and calf muscle R 5 min, L 5 mins  Supine patella mobilizations R 2 mins, L 2 mins STM lateral hip, glute and calf muscle R 5 min, L 5 mins     STM R knee and calf 6 mins  STM L knee and calf 6 mins STM knee joint and lower leg R 10 mins, L 5 mins   Therapeutic Exercise Min 30 30 33 25   Neuro Re-Ed Min  5     Manual Therapy Min 15 10 12 15   Total of Timed Procedures  45 45 45 40   Total of Service Based 0 0 0 0   Total Treatment Time 45 45 45 40         HEP  Hook lying - bent leg march x 20                    - clam with green t band x 20  S Lying clam x 20  Seated LAQ x 20      Charges     EX 2, MT 1

## 2025-02-28 ENCOUNTER — APPOINTMENT (OUTPATIENT)
Dept: PHYSICAL THERAPY | Age: 75
End: 2025-02-28
Attending: INTERNAL MEDICINE
Payer: MEDICARE

## 2025-02-28 ENCOUNTER — TELEPHONE (OUTPATIENT)
Facility: CLINIC | Age: 75
End: 2025-02-28

## 2025-02-28 NOTE — TELEPHONE ENCOUNTER
Called Bekah from [facility] to give verbal order per Dr. Austin. Bekah v/u, no further needs or questions.

## 2025-02-28 NOTE — TELEPHONE ENCOUNTER
Patient scheduled for OK knee pain.    Future Appointments   Date Time Provider Department Center   3/10/2025  2:20 PM Juan Mancia PA-C [facility] [address]        Please advise if imaging is needed.

## 2025-03-03 ENCOUNTER — TELEPHONE (OUTPATIENT)
Dept: INTERNAL MEDICINE CLINIC | Facility: CLINIC | Age: 75
End: 2025-03-03

## 2025-03-03 DIAGNOSIS — M17.10 PRIMARY LOCALIZED OSTEOARTHRITIS OF KNEE: Primary | ICD-10-CM

## 2025-03-03 RX ORDER — AMLODIPINE BESYLATE 5 MG/1
5 TABLET ORAL DAILY
Qty: 30 TABLET | Refills: 3 | Status: SHIPPED | OUTPATIENT
Start: 2025-03-03 | End: 2026-02-26

## 2025-03-03 NOTE — TELEPHONE ENCOUNTER
Spoke with patient. Pt states that her BP taken by BLESSING RN yesterday was 177/84 and her BP today was 177/94. Pt is not experiencing any symptoms. Pt states that she has been taking her Irbesartan every night.     KS: Pt's BP readings the past two days have been 177/84 and 177/94. Pt wondering if you want to adjust BP medications?    LOV 2/24/25:  # HTN associated with DM, white coat syndrome: uncontrolled and needs to communicate home pressures to me. She is using meloxicam every other day.  Home machine is accurate. Continue irbesartan.

## 2025-03-03 NOTE — TELEPHONE ENCOUNTER
Spoke to patient to relay recommendations per Dr. Austin. Pt v/u.     KS: Pt requesting if there's an alternative to Meloxicam that she can use for pain?

## 2025-03-03 NOTE — TELEPHONE ENCOUNTER
PT is asking for KS to provide her with a BP medication. Since her last OV pt is having high readings at home ranging at times at 177/84 or 177/94. Pt is asking for medication to be sent to the [facility].

## 2025-03-03 NOTE — TELEPHONE ENCOUNTER
Uncontrolled HTN - cont irbesartan and ADD norvasc 5 mg every day (most common s/e is ankle swelling). Update me with home pressures in 7-10 days.   Meloxicam can cause BP elevation. She needs pain control so I understand if she cannot stop/reduce meloxicam. But if she is not sure whether meloxicam is helping her pain then please ask her to reduce/stop it .

## 2025-03-04 ENCOUNTER — TELEPHONE (OUTPATIENT)
Dept: INTERNAL MEDICINE CLINIC | Facility: CLINIC | Age: 75
End: 2025-03-04

## 2025-03-04 RX ORDER — TRAMADOL HYDROCHLORIDE 25 MG/1
25 TABLET, COATED ORAL DAILY PRN
Qty: 30 TABLET | Refills: 0 | Status: SHIPPED | OUTPATIENT
Start: 2025-03-04 | End: 2025-03-06

## 2025-03-04 NOTE — TELEPHONE ENCOUNTER
Left message for patient to call back. Dr. Austin has recommendations for pain below. Please relay and ask if she's willing to try Tramadol.

## 2025-03-04 NOTE — TELEPHONE ENCOUNTER
Yes, we can send tramadol. Tramadol will not affect her blood pressures but it can cause drowsiness and constipation. I will send if she would like to try it.

## 2025-03-04 NOTE — TELEPHONE ENCOUNTER
Start of care yesterday, Home nursing and physical therapy.   Elevated BP. 170/90. 165/92 yesterday. Pt is also checking at home. Has been in 170's. Believing it is from Meloxicam. Pulse has not been high. Pt has been c/o pain. [facility] asking Pt to follow up with KS and to take BP first thing in the morning. Pulse has been about 69.

## 2025-03-06 ENCOUNTER — TELEPHONE (OUTPATIENT)
Dept: INTERNAL MEDICINE CLINIC | Facility: CLINIC | Age: 75
End: 2025-03-06

## 2025-03-06 DIAGNOSIS — M17.10 PRIMARY LOCALIZED OSTEOARTHRITIS OF KNEE: Primary | ICD-10-CM

## 2025-03-06 RX ORDER — TRAMADOL HYDROCHLORIDE 50 MG/1
50 TABLET ORAL DAILY PRN
Qty: 30 TABLET | Refills: 0 | Status: SHIPPED | OUTPATIENT
Start: 2025-03-06

## 2025-03-06 NOTE — TELEPHONE ENCOUNTER
[facility]  Ph# [phone]    Clarissa is wanting to request an alternative med for 'tramadol 25 mg' since it is not covered by ins. Clarissa says the only version covered by ins is 'tramadol 50 mg'.    Can a new script be sent for 'tramadol 50mg'? She has already cancelled the orig.

## 2025-03-06 NOTE — TELEPHONE ENCOUNTER
KS: Please see below. Ok to prescribe 50mg tramadol? Pended 15 pills to be cut in half? Please review and sign if you agree, TY

## 2025-03-10 ENCOUNTER — OFFICE VISIT (OUTPATIENT)
Facility: CLINIC | Age: 75
End: 2025-03-10
Payer: MEDICARE

## 2025-03-10 VITALS — WEIGHT: 176 LBS | HEIGHT: 60 IN | BODY MASS INDEX: 34.55 KG/M2

## 2025-03-10 DIAGNOSIS — M17.0 PRIMARY OSTEOARTHRITIS OF BOTH KNEES: Primary | ICD-10-CM

## 2025-03-10 RX ORDER — ATORVASTATIN CALCIUM 40 MG/1
40 TABLET, FILM COATED ORAL DAILY
Qty: 90 TABLET | Refills: 3 | Status: SHIPPED | OUTPATIENT
Start: 2025-03-10

## 2025-03-10 RX ORDER — TRIAMCINOLONE ACETONIDE 40 MG/ML
80 INJECTION, SUSPENSION INTRA-ARTICULAR; INTRAMUSCULAR ONCE
Status: COMPLETED | OUTPATIENT
Start: 2025-03-10 | End: 2025-03-10

## 2025-03-10 RX ADMIN — TRIAMCINOLONE ACETONIDE 80 MG: 40 INJECTION, SUSPENSION INTRA-ARTICULAR; INTRAMUSCULAR at 15:06:00

## 2025-03-10 NOTE — PROCEDURES
Risks and benefits of knee injection discussed with the patient, with risks including but not limited to pain and swelling at the injection site and/or within the knee joint, infection, elevation in blood pressure and/or glucose levels, facial flushing. After informed consent, the patient's right and left knees were marked, locally anesthetized with skin refrigerant, prepped with topical antiseptic, and injected with a mixture of 1mL 40mg/mL Kenalog, 2mL 1% lidocaine and 2mL 0.5% marcaine through the inferolateral portal.  A band-aid was applied.  The patient tolerated the procedure well.    Juan Mancia PA-C  [facility]

## 2025-03-10 NOTE — TELEPHONE ENCOUNTER
Protocol passed     LOV: 2/24/25   RTC: 3 months  Filled: 4/4/24 390 3 refill   Labs: 9/18/24   Future Appointments   Date Time Provider Department Center   3/10/2025  2:20 PM Juan Mancia PA-C [facility] [address]   4/2/2025  7:00 AM [facility] STRESS E[facility] RM 1 [facility]   4/2/2025  8:00 AM [facility] NUC RM 1 [facility] NU [facility]

## 2025-03-10 NOTE — H&P
[facility] New Patient Note    CC:   Chief Complaint   Patient presents with    Knee Pain     Bilateral knee pain  Onset: 2 years (no injury)  Tried: PT, Meloxicam (caused HTN, tramadol, acetaminophen  Current fasting     HPI: This 75 year old female presents today with complaints of bilateral knee pain.  Patient reports a several year history of bilateral knee pain.  Feels the right knee is more painful than the left knee.  Pain localized to the medial joint line bilaterally.  She previously felt relief with meloxicam and Tylenol, but after her blood pressure increase she had to discontinue meloxicam.  She has been attending physical therapy for the knees as well.  No previous injections or surgeries to either knee.    Past Medical History:    Breast CA (HCC)    left breast    Breast cancer in female (HCC)    s/p chemo and radiation    Diabetes (HCC)    Ductal carcinoma in situ of breast    Exposure to medical diagnostic radiation    High blood pressure    High cholesterol    Osteoarthritis    Personal history of antineoplastic chemotherapy     Past Surgical History:   Procedure Laterality Date    Chemotherapy Left 1990    6 months    Lumpectomy left Left 1990    breast cancer with chemo/radiation    Mastectomy left  12/2020    IDC, DCIS    Needle biopsy left Left 1990    Radiation left  1990    1 1/2 months     Current Outpatient Medications   Medication Sig Dispense Refill    traMADol 50 MG Oral Tab Take 1 tablet (50 mg total) by mouth daily as needed for Pain. Can take one-half to one full tablet once daily as needed 30 tablet 0    Naloxone HCl 4 MG/0.1ML Nasal Liquid 4 mg by Nasal route as needed. If patient remains unresponsive, repeat dose in other nostril 2-5 minutes after first dose. (Patient not taking: Reported on 3/10/2025) 1 kit 0    amLODIPine 5 MG Oral Tab Take 1 tablet (5 mg total) by mouth daily. 30 tablet 3    omeprazole 20 MG Oral Capsule Delayed Release Take 1 capsule (20 mg total) by mouth  every morning.      METFORMIN 500 MG Oral Tab TAKE 1 TABLET EVERY DAY 90 tablet 3    IRBESARTAN 300 MG Oral Tab TAKE 1 TABLET EVERY EVENING 90 tablet 3    Alcohol Swabs (DROPSAFE ALCOHOL PREP) 70 % Does not apply Pads Apply 1 Application topically 3 (three) times daily as needed. 100 each 3    atorvastatin 40 MG Oral Tab Take 1 tablet (40 mg total) by mouth daily. Replaces 20 mg dose 90 tablet 3    anastrozole 1 MG Oral Tab tab Take 1 tablet (1 mg total) by mouth daily. 90 tablet 3    MARIELLE MICROLET LANCETS Does not apply Misc Test blood sugar twice a day-DX:E11.9 100 each 3    Multiple Vitamin (DAILY ESPERANZA) Oral Tab Take 1 tablet by mouth daily.      Cholecalciferol (VITAMIN D) 1000 UNITS Oral Tab Take 1,000 Units by mouth daily.      Calcium Carbonate-Vitamin D 600-400 MG-UNIT Oral Tab Take 1 tablet by mouth daily.      fluticasone propionate 50 MCG/ACT Nasal Suspension 2 sprays by Each Nare route daily. (Patient not taking: Reported on 3/10/2025) 16 g 1    MELOXICAM 7.5 MG Oral Tab TAKE 1 TABLET EVERY DAY AS NEEDED FOR PAIN (Patient not taking: Reported on 3/10/2025) 90 tablet 3     Allergies[1]  Family History   Problem Relation Age of Onset    Cancer Father 70        \"Blood cancer\"    Diabetes Mother         with ESRD.    Diabetes Brother     Breast Cancer Self         48    DCIS Self      Social History     Occupational History    Not on file   Tobacco Use    Smoking status: Never    Smokeless tobacco: Never   Vaping Use    Vaping status: Never Used   Substance and Sexual Activity    Alcohol use: No     Alcohol/week: 0.0 standard drinks of alcohol    Drug use: No    Sexual activity: Not on file        ROS:  Comprehensive system review obtained and negative except as mentioned above    Physical Exam:    Ht 5' (1.524 m)   Wt 176 lb (79.8 kg)   BMI 34.37 kg/m²   Constitutional: Awake, alert, no distress.  Very pleasant.  Psychological: Appropriate affect.  Respiratory: Unlabored breathing.  Bilateral lower  extremity:  Inspection: skin is intact without any redness or deformity. No appreciable effusion.   Palpation: Tender palpation along the medial joint line of the left knee.  Range of motion: Knee can extend to 5 degrees short of full on the right side and full extension of the left side and flex to approximately 110 degrees on the left side and 120 on the right.  Knee is stable to valgus and varus stress at 0 and 30 degrees. No laxity with anterior or posterior drawer.   Neuromuscular: Strength is normal and sensation is intact.  Vascular: Extremities are warm and well-perfused.  Lymph: Unremarkable.    Imaging: Imaging was personally viewed, independently interpreted and radiology report read.  Radiographs of both knees obtained on 2/27/2025 demonstrate severe medial compartmental joint space narrowing bilateral.    Assessment/Plan:  Diagnoses and all orders for this visit:    Primary osteoarthritis of both knees  -     Large joint - bilateral aspiration/injection  -     triamcinolone acetonide (Kenalog-40) 40 MG/ML injection 80 mg      Assessment: 75-year-old female with symptomatic radiographically severe bilateral knee osteoarthritis    Plan: I discussed the etiology, natural history, and management options for symptomatic knee osteoarthritis.  I discussed nonsurgical and surgical treatments, with nonsurgical treatments to include anti-inflammatory medications, injections, activity modification, weight loss, low impact exercise and possible therapy.  Surgery would be with knee replacement and is an elective operation reserved for when nonsurgical treatments no longer alleviate symptoms sufficiently.  Patient would like to try corticosteroid injection prior to consideration of knee replacement surgery. A cortisone injection was administered into the right and left knee in clinic today, please see separate procedure note for additional details. I explained the risks of the injection with the patient including  failure to relieve pain and possible cartilage wear with chronic use of injections. I explained to the patient that the cortisone may take 2-3 days to take effect. I explained that the injection could be repeated at least every 3 months as long as it continues to provide relief of symptoms. The patient may contact our office if they are interested in repeating the injection, and we can schedule them for a procedure-only appointment.  Patient's questions were sought answered satisfactorily.  She is happy with the plan will follow as advised.      Juan Mancia PA-C  [facility]    This note was dictated using Dragon software.  While it was briefly proofread prior to completion, some grammatical, spelling, and word choice errors due to dictation may still occur.       [1] No Known Allergies

## 2025-03-11 ENCOUNTER — TELEPHONE (OUTPATIENT)
Dept: INTERNAL MEDICINE CLINIC | Facility: CLINIC | Age: 75
End: 2025-03-11

## 2025-03-11 NOTE — TELEPHONE ENCOUNTER
Spoke with patient. Patient does not require referral for visit. Patient concerned by paperwork given stating she may have to pay for procedure if insurance does not cover. This RN told patient that paperwork is just a precaution but she should call her insurance to verify procedure is covered. Verbalized understanding.

## 2025-03-11 NOTE — TELEPHONE ENCOUNTER
Referral Request - Please ensure insurance is updated and verified.    Reason for the referral: knee pain    Has the patient been seen by their PCP for this condition (if not, schedule an appointment): Yes  Is an authorized or pending referral in [facility]? Yes   -If yes, notify the patient of the status and/or fax per patient request.    Provider, specialty and contact information:   -Name: Dr. Rick Lopez (she was already treated by AQUILINO Salgado yesterday)  -Address: [address] [place] [address]  -Phone: [phone]  -Fax: n/a    Has the patient seen this specialist in the past?: **  CPT Code for this visit:   Dx Code: M17.10  Number of visits requested: one has been approved on 2/24  Date of appointment, if scheduled (route as high priority if within 1 week): pt had a visit yesterday  If requesting out of network provider, please provide reason:       Notify the patient that referrals can take up to 1 week or sometimes longer for authorization.    Pt is concerned because she was given paperwork that she may be financially responsible for the visit and she was told by the specialist office that they could not contact insurance on her behalf to make sure services would be covered prior to giving her the injections on her knees.

## 2025-03-12 ENCOUNTER — TELEPHONE (OUTPATIENT)
Dept: INTERNAL MEDICINE CLINIC | Facility: CLINIC | Age: 75
End: 2025-03-12

## 2025-03-12 NOTE — TELEPHONE ENCOUNTER
[facility] calling with update and MARCOI for KS. At visit /70. Pt has been elevated. PT has been tracking, pt is consistently in 160's on day 8 of 10 of tracking her BP. Asking if maybe rx dose change needs to be done?

## 2025-03-13 NOTE — TELEPHONE ENCOUNTER
BLESSING RN reporting patient is having elevated blood pressure readings for the last 8 days. Consistently with 160 systolic.    Patient currently on -   Ibersartan 30mmg once daily   Amlodipine 5mg once daily    Any medication adjustments?

## 2025-03-14 ENCOUNTER — TELEPHONE (OUTPATIENT)
Dept: INTERNAL MEDICINE CLINIC | Facility: CLINIC | Age: 75
End: 2025-03-14

## 2025-03-14 DIAGNOSIS — C50.912 INVASIVE DUCTAL CARCINOMA OF BREAST, FEMALE, LEFT (HCC): ICD-10-CM

## 2025-03-14 DIAGNOSIS — E11.9 TYPE 2 DIABETES MELLITUS WITHOUT COMPLICATION, WITHOUT LONG-TERM CURRENT USE OF INSULIN (HCC): Primary | ICD-10-CM

## 2025-03-14 DIAGNOSIS — E78.5 HYPERLIPIDEMIA ASSOCIATED WITH TYPE 2 DIABETES MELLITUS (HCC): ICD-10-CM

## 2025-03-14 DIAGNOSIS — M54.50 CHRONIC MIDLINE LOW BACK PAIN WITHOUT SCIATICA: ICD-10-CM

## 2025-03-14 DIAGNOSIS — I15.2 HYPERTENSION ASSOCIATED WITH DIABETES (HCC): ICD-10-CM

## 2025-03-14 DIAGNOSIS — E11.69 HYPERLIPIDEMIA ASSOCIATED WITH TYPE 2 DIABETES MELLITUS (HCC): ICD-10-CM

## 2025-03-14 DIAGNOSIS — G89.29 CHRONIC MIDLINE LOW BACK PAIN WITHOUT SCIATICA: ICD-10-CM

## 2025-03-14 DIAGNOSIS — E66.9 TYPE 2 DIABETES MELLITUS WITH OBESITY (HCC): ICD-10-CM

## 2025-03-14 DIAGNOSIS — E11.69 TYPE 2 DIABETES MELLITUS WITH OBESITY (HCC): ICD-10-CM

## 2025-03-14 DIAGNOSIS — E11.59 HYPERTENSION ASSOCIATED WITH DIABETES (HCC): ICD-10-CM

## 2025-03-14 NOTE — TELEPHONE ENCOUNTER
Returned call to Venita FUNK RN, left detailed message on confidential voicemail per Dr. Austin's recommendations below.

## 2025-03-14 NOTE — TELEPHONE ENCOUNTER
Please ask her to increase amlodipine to 10 mg every evening and continue 300 mg every evening. Please update me with blood pressures in 2 weeks.

## 2025-03-17 NOTE — PROGRESS NOTES
Cancer Center Progress Note      Patient Name:  Javi Pratt  YOB: 1950  Medical Record Number:  [id number]    Date of visit:  3/19/2025    CHIEF COMPLAINT: L breast ca s/p mastectomy    HPI:     75 year old that I have followed since 12/20.  S/p L mastectomy 12/20-2.2 cm IDC, 100% ER/NC, HER-2 neg.  h/o L breast carcinoma diagnosed in 1990, treated with lumpectomy/RT/6 months of chemo/5 years of endocrine therapy.  Oncotype on tumor was 14.  She started anastrozole in 1/21.  Presents for evaluation.    Stable knee pain.  Back pain on and off but stable for several years.  No new areas of bone pain.  Compliant with medication.    SOCIAL HISTORY:    , lives with .  No children.  Has good family support.    ROS:     Constitutional: no fever, chills fatigue,night sweats or weight loss  Neurologic: no headache, seizures, diplopia or weakness  Respiratory: no cough, SOB or hemoptysis  Cardiovascular: no chest pain, ankle swelling, DOWNS  GI: no nausea, vomiting, diarrhea or BRBPR  Musculoskeletal: no body-ache or joint pain  Dermatologic: no alopecia, rash, pruritis  : no hematuria, dysuria or frequency  Psych: no confusion or depression   Heme: no easy bruising or bleeding     ALLERGIES:  No Known Allergies    MEDICATIONS:    Current Outpatient Medications   Medication Sig Dispense Refill    ATORVASTATIN 40 MG Oral Tab TAKE 1 TABLET (40 MG TOTAL) BY MOUTH DAILY. REPLACES 20 MG DOSE 90 tablet 3    traMADol 50 MG Oral Tab Take 1 tablet (50 mg total) by mouth daily as needed for Pain. Can take one-half to one full tablet once daily as needed 30 tablet 0    Naloxone HCl 4 MG/0.1ML Nasal Liquid 4 mg by Nasal route as needed. If patient remains unresponsive, repeat dose in other nostril 2-5 minutes after first dose. 1 kit 0    amLODIPine 5 MG Oral Tab Take 1 tablet (5 mg total) by mouth daily. 30 tablet 3    omeprazole 20 MG Oral Capsule Delayed Release Take 1 capsule (20 mg total) by  mouth every morning.      METFORMIN 500 MG Oral Tab TAKE 1 TABLET EVERY DAY 90 tablet 3    IRBESARTAN 300 MG Oral Tab TAKE 1 TABLET EVERY EVENING 90 tablet 3    fluticasone propionate 50 MCG/ACT Nasal Suspension 2 sprays by Each Nare route daily. 16 g 1    MELOXICAM 7.5 MG Oral Tab TAKE 1 TABLET EVERY DAY AS NEEDED FOR PAIN 90 tablet 3    Alcohol Swabs (DROPSAFE ALCOHOL PREP) 70 % Does not apply Pads Apply 1 Application topically 3 (three) times daily as needed. 100 each 3    anastrozole 1 MG Oral Tab tab Take 1 tablet (1 mg total) by mouth daily. 90 tablet 3    MARIELLE MICROLET LANCETS Does not apply Misc Test blood sugar twice a day-DX:E11.9 100 each 3    Multiple Vitamin (DAILY ESPERANZA) Oral Tab Take 1 tablet by mouth daily.      Cholecalciferol (VITAMIN D) 1000 UNITS Oral Tab Take 1,000 Units by mouth daily.      Calcium Carbonate-Vitamin D 600-400 MG-UNIT Oral Tab Take 1 tablet by mouth daily.         VITALS:  Height: 154.9 cm (5' 0.98\") (1419)  Weight: 78.8 kg (173 lb 12.8 oz) (1419)  BSA (Calculated - sq m): 1.78 sq meters (1419)  Pulse: 75 (1419)  BP: 141/69 (1419)  Temp: 97.8 °F (36.6 °C) (1419)  Do Not Use - Resp Rate: --  SpO2: 98 % (1419)    PS:  ECO    PHYSICAL EXAM:    General: alert and oriented x 3, not in acute distress. Accompanied by friend.   HEENT: MASOOD, oropharynx  clear.  Neck: supple.  No JVD /adenopathy.  CVS: S1S2, regular  Rhythm, no murmurs.   Lungs: Clear to auscultation and percussion.  Abdomen: Soft, non tender, no hepato-splenomegaly.  Extremities:  No edema.  CNS: no focal deficit  Breast exam: R breast soft, no masses/axillary adenopathy. L mastectomy scar well healed, no masses/ax adenopathy.  Musculoskeletal: ROM left shoulder is restricted and painful.    Emotional well being: Patient's emotional well being was assessed.  No issues requiring acute psychosocial intervention were identified.     LABS:     No results found for this or any  previous visit (from the past 24 hours).    ASSESSMENT AND PLAN:     # L breast ca: h/o L breast carcinoma 1990, s/p  lumpectomy/RT/6 months of chemo/5 years of AI.  Recurrent L breast ca s/p L mastectomy 12/20-2.2 cm grade 2 IDC, 100% ER/MI, HER-2 negative.  Oncotype 14 which corresponds to a 4% risk of distant recurrence at 9 years with 5 years of endocrine therapy.  Not candidate for RT.  Started anastrozole 1/21, continue till 1/26.  Right mammogram 7/24 okay, next due 7/25.      # Osteopenia: DEXA 7/23 showed mild osteopenia with T-score -1.4, bit lower compared to 1 prior to this.  Continue calcium/vitamin D/weightbearing exercises and repeat DEXA 7/25.    May stop AI 1/26.      ORDERS PLACED:        Procedures    XR DEXA BONE DENSITOMETRY (CPT=77080)       Return in about 1 year (around 3/19/2026) for MD visit.    Raisa Welch M.D.    [facility]    [facility]  [address] [place], [place], [address]      3/19/2025

## 2025-03-18 ENCOUNTER — TELEPHONE (OUTPATIENT)
Dept: INTERNAL MEDICINE CLINIC | Facility: CLINIC | Age: 75
End: 2025-03-18

## 2025-03-18 NOTE — TELEPHONE ENCOUNTER
Pt called in states she is having stomach pain, using Omeprazole BID. Stomach pain started 15-16 days ago, pain is constant pain, Pain is described as \"sharp pain\".     Please advise

## 2025-03-18 NOTE — TELEPHONE ENCOUNTER
KS: Pended HH order with social work visit. Filled out as much as I can. Please review and sign as appropriate. TY

## 2025-03-19 ENCOUNTER — OFFICE VISIT (OUTPATIENT)
Age: 75
End: 2025-03-19
Attending: INTERNAL MEDICINE
Payer: MEDICARE

## 2025-03-19 VITALS
WEIGHT: 173.81 LBS | RESPIRATION RATE: 18 BRPM | SYSTOLIC BLOOD PRESSURE: 141 MMHG | DIASTOLIC BLOOD PRESSURE: 69 MMHG | BODY MASS INDEX: 32.82 KG/M2 | OXYGEN SATURATION: 98 % | TEMPERATURE: 98 F | HEART RATE: 75 BPM | HEIGHT: 60.98 IN

## 2025-03-19 DIAGNOSIS — Z78.0 POSTMENOPAUSAL: ICD-10-CM

## 2025-03-19 DIAGNOSIS — T50.905D ADVERSE EFFECT OF DRUG, SUBSEQUENT ENCOUNTER: ICD-10-CM

## 2025-03-19 DIAGNOSIS — C50.912 INVASIVE DUCTAL CARCINOMA OF BREAST, FEMALE, LEFT (HCC): Primary | ICD-10-CM

## 2025-03-19 NOTE — TELEPHONE ENCOUNTER
Called pt, she is wanting to know if she can come at 11am instead due to other appts for the day. Please advise.

## 2025-03-19 NOTE — TELEPHONE ENCOUNTER
Spoke to patient, apt scheduled.    Future Appointments   Date Time Provider Department Center   3/19/2025  2:15 PM Vania Welch MD NP [facility]   3/24/2025  1:00 PM Angelica Austin MD [facility] 8 [facility]   4/2/2025  7:00 AM [facility] ECHO RM 1 [facility]   4/2/2025  8:00 AM [facility] CARD NUC RM 1 [facility] CARD NU [facility]

## 2025-03-19 NOTE — PROGRESS NOTES
Education Record    Learner:  Patient    Disease / Diagnosis:left breast cancer   Left mastectomy     Barriers / Limitations:  None   Comments:    Method:  Discussion   Comments:    General Topics:  Plan of care reviewed   Comments:    Outcome:  Shows understanding   Comments:   Right breast imaging up to date.   Taking anastrozole   Ezio knee joint pain, received injections last week.   Has helped a bit.

## 2025-03-19 NOTE — TELEPHONE ENCOUNTER
Pt seems to have another appt around 2pm today. Is there another one I should offer? Please advise.

## 2025-03-21 DIAGNOSIS — C50.912 INVASIVE DUCTAL CARCINOMA OF BREAST, FEMALE, LEFT (HCC): ICD-10-CM

## 2025-03-21 RX ORDER — ANASTROZOLE 1 MG/1
1 TABLET ORAL DAILY
Qty: 90 TABLET | Refills: 3 | Status: SHIPPED | OUTPATIENT
Start: 2025-03-21

## 2025-03-24 ENCOUNTER — OFFICE VISIT (OUTPATIENT)
Dept: INTERNAL MEDICINE CLINIC | Facility: CLINIC | Age: 75
End: 2025-03-24
Payer: MEDICARE

## 2025-03-24 VITALS
OXYGEN SATURATION: 98 % | HEIGHT: 60.98 IN | DIASTOLIC BLOOD PRESSURE: 76 MMHG | TEMPERATURE: 98 F | BODY MASS INDEX: 32.97 KG/M2 | WEIGHT: 174.63 LBS | HEART RATE: 91 BPM | SYSTOLIC BLOOD PRESSURE: 134 MMHG | RESPIRATION RATE: 14 BRPM

## 2025-03-24 DIAGNOSIS — E11.59 HYPERTENSION ASSOCIATED WITH DIABETES (HCC): ICD-10-CM

## 2025-03-24 DIAGNOSIS — M17.10 PRIMARY LOCALIZED OSTEOARTHRITIS OF KNEE: ICD-10-CM

## 2025-03-24 DIAGNOSIS — I10 WHITE COAT SYNDROME WITH DIAGNOSIS OF HYPERTENSION: Primary | ICD-10-CM

## 2025-03-24 DIAGNOSIS — R10.13 EPIGASTRIC ABDOMINAL PAIN: ICD-10-CM

## 2025-03-24 DIAGNOSIS — R68.81 EARLY SATIETY: ICD-10-CM

## 2025-03-24 DIAGNOSIS — I15.2 HYPERTENSION ASSOCIATED WITH DIABETES (HCC): ICD-10-CM

## 2025-03-24 PROCEDURE — 1159F MED LIST DOCD IN RCRD: CPT | Performed by: INTERNAL MEDICINE

## 2025-03-24 PROCEDURE — G2211 COMPLEX E/M VISIT ADD ON: HCPCS | Performed by: INTERNAL MEDICINE

## 2025-03-24 PROCEDURE — 1160F RVW MEDS BY RX/DR IN RCRD: CPT | Performed by: INTERNAL MEDICINE

## 2025-03-24 PROCEDURE — 3075F SYST BP GE 130 - 139MM HG: CPT | Performed by: INTERNAL MEDICINE

## 2025-03-24 PROCEDURE — 3008F BODY MASS INDEX DOCD: CPT | Performed by: INTERNAL MEDICINE

## 2025-03-24 PROCEDURE — 3078F DIAST BP <80 MM HG: CPT | Performed by: INTERNAL MEDICINE

## 2025-03-24 PROCEDURE — 99214 OFFICE O/P EST MOD 30 MIN: CPT | Performed by: INTERNAL MEDICINE

## 2025-03-24 RX ORDER — AMLODIPINE BESYLATE 5 MG/1
5 TABLET ORAL DAILY
Qty: 90 TABLET | Refills: 3 | Status: SHIPPED | OUTPATIENT
Start: 2025-03-24 | End: 2026-03-19

## 2025-03-24 NOTE — PROGRESS NOTES
Javi Pratt is a 75 year old female.     HPI:   Patient presents for the following issues  Abdominal pain - began 3 weeks ago since Ramadan started. She has one more week of fasting. No pain at night, does not interrupt her sleep. Her belly pain improves when she breaks her fast at sunset. The pain begins after she has a BM around 10:30AM. It is located over epigastrium and lasts until she eats. No associated n/v, diarrhea, constipation. No melena or hematochezia. She increased her omeprazole to BID from Qdaily but that has not helped. Exacerbated by spicy, fried, foods. She is also experiencing early satiety in the past 3 weeks. No weight loss.   DOWNS - symptoms are stlil present. She has cardiac testing scheduled.   HTN - we added norvasc to irbesartan on 3/3/2025. She is tolerating it well. She also stopped meloxicam. Home pressures are around 130/80s.   Bilateral knee pain - she stopped meloxicam for uncontrolled HTN. Tramadol was too strong. On 3/10/2025 she had bilateral knee injections. Pain is slightly improved and she is able to walk a bit more.     REVIEW OF SYSTEMS:   GENERAL HEALTH: feels well otherwise. No f/c  NEURO: denies any headaches, LH, dizzyness, LOC, falls  VISION: denies any blurred or double vision  RESPIRATORY: denies shortness of breath, cough, or congestion  CARDIOVASCULAR: denies chest pain, pressure or palpitations  GI: see HPI  : no dysuria or hematuria  PSYCH: mood is stable  EXT: denies edema           Wt Readings from Last 6 Encounters:   03/24/25 174 lb 9.6 oz (79.2 kg)   03/19/25 173 lb 12.8 oz (78.8 kg)   03/10/25 176 lb (79.8 kg)   02/24/25 176 lb 3.2 oz (79.9 kg)   12/11/24 176 lb 3.2 oz (79.9 kg)   08/28/24 174 lb 3.2 oz (79 kg)       No Known Allergies    Family History   Problem Relation Age of Onset    Cancer Father 70        \"Blood cancer\"    Diabetes Mother         with ESRD.    Diabetes Brother     Breast Cancer Self         49    DCIS Self       Past Medical  History:    Breast CA (HCC)    left breast    Breast cancer in female (HCC)    s/p chemo and radiation    Diabetes (HCC)    Ductal carcinoma in situ of breast    Exposure to medical diagnostic radiation    High blood pressure    High cholesterol    Osteoarthritis    Personal history of antineoplastic chemotherapy      Past Surgical History:   Procedure Laterality Date    Chemotherapy Left 1990    6 months    Lumpectomy left Left 1990    breast cancer with chemo/radiation    Mastectomy left  12/2020    IDC, DCIS    Needle biopsy left Left 1990    Radiation left  1990    1 1/2 months      Social History:    Social History     Socioeconomic History    Marital status:    Tobacco Use    Smoking status: Never    Smokeless tobacco: Never   Vaping Use    Vaping status: Never Used   Substance and Sexual Activity    Alcohol use: No     Alcohol/week: 0.0 standard drinks of alcohol    Drug use: No   Other Topics Concern    Caffeine Concern No    Exercise Yes    Seat Belt Yes    Special Diet No    Stress Concern No    Weight Concern Yes   Social History Narrative    ** Merged History Encounter **                EXAM:   /76 (BP Location: Right arm, Patient Position: Sitting, Cuff Size: adult)   Pulse 91   Temp 97.9 °F (36.6 °C) (Temporal)   Resp 14   Ht 5' 0.98\" (1.549 m)   Wt 174 lb 9.6 oz (79.2 kg)   SpO2 98%   BMI 33.01 kg/m²   GENERAL: A&O well developed, well nourished,in no apparent distress  SKIN: no rashes,no suspicious lesions  HEENT: atraumatic,   NECK: supple, no jvd, no thyromegaly  LUNGS: clear to auscultation bilateraly, no c/w/r  CARDIO: RRR without g/m/r  GI: obese, softly distended, has mild tenderness in epigastrium only. No rebound or guarding, normal BS throughout  NEURO: CN 2-12 grossly intact  PSYCH: pleasant  EXTREMITIES: no cyanosis, clubbing or edema        ASSESSMENT AND PLAN:   # Epigastric abdominal pain, early satiety - all started with onset of ramadan. Symptoms sound c/w GERD.  BID PPI is not helping. Eating helps. She prefers to complete the last week of Ramadan. If her symptoms do not resolve once she is eating regularly then obtain US abdomen and refer to GI.   # DOWNS - needs cardio-pulmonary evaluation. Start with stress test and echo. She is not able to walk on treadmill.   # Bilateral Knee Pain 2/2 OA: improving a bit since getting injections. Cont HEP. Can resume prn meloxicam as her blood pressures are improved. Tramadol was too strong.   # HTN associated with DM, white coat syndrome: controlled since adding norvasc 3/2024, cont irbesartan. Home machine is accurate.   # Left Breast Cancer, s/p left mastectomy: previous left breast carcinoma 1990, status post lumpectomy/RT/6 months of chemo/5 years of AI.  Recurrent left breast carcinoma diagnosed after abnormal screening mammogram 10/2020. - Started anastrozole January 2021, continue until January, 2026    # HLP associated with DM: at goal on atorvastatin.   # Type 2 Diabetes in Obese, BMI 32: at goal in 12/2024.   - continue to reinforce importance of weight loss, carb controlled diet, and exercise.   - Cont metformin 500 mg once daily. Higher dose causes abdominal pain.   - DM Eye Exam - done on 4/2024 by Adam Mason. 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 Continue daily HEP.   # GERD: has been told she has duodenal erythema on past EGDs in [place].   # Osteopenia of lumbar spine - low FRAX per DEXA in 2023. educated on dietary calcium/vit D3, weight bearing exercises, fall prevention.   # Health Maintenance: medicare supervisit on February, 2025  Colon Cancer Screening: Cont to decline c-scope, FIT or cologuard.   Breast Cancer Screening: continue yearly mammogram.   Bone Health: see above  Vaccines: Up to date on pneumovac 23 and  prevnar 13. TDAP 2017. Advised on shingrix, rsv, flu, and covid.   Medical POA: Brother, William Pratt  Hepatitis C Screen: AB neg 2019       The patient indicates understanding of these issues and agrees to the plan.  The patient is asked to return in 3 months for close f/u of multilpe isseus.   Angelica Austin MD

## 2025-03-24 NOTE — PATIENT INSTRUCTIONS
If your stomach symptoms do not improve once Ramadan is over then you will need to see a gastroenterologist to consider a scope.

## 2025-04-02 ENCOUNTER — HOSPITAL ENCOUNTER (OUTPATIENT)
Dept: CV DIAGNOSTICS | Facility: HOSPITAL | Age: 75
Discharge: HOME | End: 2025-04-02
Attending: INTERNAL MEDICINE
Payer: MEDICARE

## 2025-04-02 DIAGNOSIS — R06.09 DOE (DYSPNEA ON EXERTION): ICD-10-CM

## 2025-04-02 PROCEDURE — 93017 CV STRESS TEST TRACING ONLY: CPT | Performed by: INTERNAL MEDICINE

## 2025-04-02 PROCEDURE — 93306 TTE W/DOPPLER COMPLETE: CPT | Performed by: INTERNAL MEDICINE

## 2025-04-02 PROCEDURE — 93018 CV STRESS TEST I&R ONLY: CPT | Performed by: INTERNAL MEDICINE

## 2025-04-02 PROCEDURE — 78452 HT MUSCLE IMAGE SPECT MULT: CPT | Performed by: INTERNAL MEDICINE

## 2025-04-02 RX ORDER — REGADENOSON 0.08 MG/ML
INJECTION, SOLUTION INTRAVENOUS
Status: COMPLETED
Start: 2025-04-02 | End: 2025-04-02

## 2025-04-02 RX ADMIN — REGADENOSON 0.4 MG: 0.08 INJECTION, SOLUTION INTRAVENOUS at 09:30:00

## 2025-04-04 ENCOUNTER — TELEPHONE (OUTPATIENT)
Dept: INTERNAL MEDICINE CLINIC | Facility: CLINIC | Age: 75
End: 2025-04-04

## 2025-04-04 NOTE — TELEPHONE ENCOUNTER
I called patient regarding her abnormal stress test. I discussed next steps, her symptoms, and when to proceed to ER. She understands and will make an appt with cardiology.

## 2025-04-04 NOTE — TELEPHONE ENCOUNTER
Pt is wanting to know the results of her Cardio tests from 4/2. Pt was informed this is not yet reviewed by [facility] since she is not back in office til Mon.    Pt is wanting to know if RP can review these results for her today? Please advise.

## 2025-04-08 ENCOUNTER — TELEPHONE (OUTPATIENT)
Dept: INTERNAL MEDICINE CLINIC | Facility: CLINIC | Age: 75
End: 2025-04-08

## 2025-04-08 DIAGNOSIS — H35.3131 EARLY STAGE NONEXUDATIVE AGE-RELATED MACULAR DEGENERATION OF BOTH EYES: Primary | ICD-10-CM

## 2025-04-08 DIAGNOSIS — H16.223 BILATERAL KERATOCONJUNCTIVITIS SICCA: ICD-10-CM

## 2025-04-08 NOTE — TELEPHONE ENCOUNTER
Dr. Jonathon Mason (ophthalmologist) office called to request a referral     Dx: H35.3131  H16.223    Phone# [phone]  Fax#[phone]

## 2025-04-09 ENCOUNTER — ORDER TRANSCRIPTION (OUTPATIENT)
Dept: ADMINISTRATIVE | Facility: HOSPITAL | Age: 75
End: 2025-04-09

## 2025-04-09 DIAGNOSIS — R94.39 ABNORMAL CARDIOVASCULAR STRESS TEST: Primary | ICD-10-CM

## 2025-04-09 DIAGNOSIS — R06.09 DOE (DYSPNEA ON EXERTION): ICD-10-CM

## 2025-04-09 DIAGNOSIS — E11.69 HYPERLIPIDEMIA ASSOCIATED WITH TYPE 2 DIABETES MELLITUS (HCC): ICD-10-CM

## 2025-04-09 DIAGNOSIS — E78.5 HYPERLIPIDEMIA ASSOCIATED WITH TYPE 2 DIABETES MELLITUS (HCC): ICD-10-CM

## 2025-04-09 DIAGNOSIS — I15.2 HYPERTENSION ASSOCIATED WITH DIABETES (HCC): ICD-10-CM

## 2025-04-09 DIAGNOSIS — E11.59 HYPERTENSION ASSOCIATED WITH DIABETES (HCC): ICD-10-CM

## 2025-04-15 ENCOUNTER — TELEPHONE (OUTPATIENT)
Dept: INTERNAL MEDICINE CLINIC | Facility: CLINIC | Age: 75
End: 2025-04-15

## 2025-04-18 NOTE — IMAGING NOTE
Pt called and instructed to arrive 45 minutes prior to gated study scheduled on 4/22/25 at 0845. Park in [facility], eat a light breakfast/lunch, hydrate, hold caffeine/decaff/chocolate x 12 hours prior, hold long acting nitrates, take all meds especially beta blockers prescribed. Pt encouraged to call with further questions.

## 2025-04-22 ENCOUNTER — HOSPITAL ENCOUNTER (OUTPATIENT)
Dept: CT IMAGING | Facility: HOSPITAL | Age: 75
Discharge: HOME | End: 2025-04-22
Attending: INTERNAL MEDICINE
Payer: MEDICARE

## 2025-04-22 VITALS — HEART RATE: 65 BPM | DIASTOLIC BLOOD PRESSURE: 60 MMHG | RESPIRATION RATE: 19 BRPM | SYSTOLIC BLOOD PRESSURE: 128 MMHG

## 2025-04-22 DIAGNOSIS — R06.09 DOE (DYSPNEA ON EXERTION): ICD-10-CM

## 2025-04-22 DIAGNOSIS — E78.5 HYPERLIPIDEMIA ASSOCIATED WITH TYPE 2 DIABETES MELLITUS (HCC): ICD-10-CM

## 2025-04-22 DIAGNOSIS — E11.59 HYPERTENSION ASSOCIATED WITH DIABETES (HCC): ICD-10-CM

## 2025-04-22 DIAGNOSIS — R94.39 ABNORMAL CARDIOVASCULAR STRESS TEST: ICD-10-CM

## 2025-04-22 DIAGNOSIS — E11.69 HYPERLIPIDEMIA ASSOCIATED WITH TYPE 2 DIABETES MELLITUS (HCC): ICD-10-CM

## 2025-04-22 DIAGNOSIS — I15.2 HYPERTENSION ASSOCIATED WITH DIABETES (HCC): ICD-10-CM

## 2025-04-22 LAB
CREAT BLD-MCNC: 0.9 MG/DL (ref 0.55–1.02)
EGFRCR SERPLBLD CKD-EPI 2021: 67 ML/MIN/1.73M2 (ref 60–?)

## 2025-04-22 PROCEDURE — 75580 N-INVAS EST C FFR SW ALY CTA: CPT | Performed by: INTERNAL MEDICINE

## 2025-04-22 PROCEDURE — 75574 CT ANGIO HRT W/3D IMAGE: CPT | Performed by: INTERNAL MEDICINE

## 2025-04-22 PROCEDURE — 82565 ASSAY OF CREATININE: CPT

## 2025-04-22 RX ORDER — METOPROLOL TARTRATE 50 MG
100 TABLET ORAL ONCE
Status: DISCONTINUED | OUTPATIENT
Start: 2025-04-22 | End: 2025-04-24

## 2025-04-22 RX ORDER — DILTIAZEM HYDROCHLORIDE 5 MG/ML
5 INJECTION INTRAVENOUS SEE ADMIN INSTRUCTIONS
Status: DISCONTINUED | OUTPATIENT
Start: 2025-04-22 | End: 2025-04-24

## 2025-04-22 RX ORDER — NITROGLYCERIN 0.4 MG/1
0.4 TABLET SUBLINGUAL EVERY 5 MIN PRN
Status: DISCONTINUED | OUTPATIENT
Start: 2025-04-22 | End: 2025-04-24

## 2025-04-22 RX ORDER — NITROGLYCERIN 0.4 MG/1
TABLET SUBLINGUAL
Status: COMPLETED
Start: 2025-04-22 | End: 2025-04-22

## 2025-04-22 RX ORDER — METOPROLOL TARTRATE 1 MG/ML
5 INJECTION, SOLUTION INTRAVENOUS SEE ADMIN INSTRUCTIONS
Status: DISCONTINUED | OUTPATIENT
Start: 2025-04-22 | End: 2025-04-24

## 2025-04-22 RX ADMIN — NITROGLYCERIN 0.4 MG: 0.4 TABLET SUBLINGUAL at 09:48:00

## 2025-04-22 NOTE — IMAGING NOTE
0940- Pt A+O x4. Procedure explained and questions answered.  Pt denies long-acting nitrates.  Pt ambulatory and assisted to CT table.  O2 at 2L NC. GFR  67. See flowsheet for VS.    CT tech Tarazo     Contrast= 80  Saline= 100  Average HR= 62    0955- Pt tolerated scan well.  Denies S/S of contrast reaction.  IV d/c'd at 0955.  Cath intact, bleeding controlled.  Pt ambulatory and escorted to changing room for discharge home.

## 2025-04-28 RX ORDER — CALCIUM CITRATE/VITAMIN D3 200MG-6.25
1 TABLET ORAL 2 TIMES DAILY
Qty: 200 STRIP | Refills: 3 | Status: SHIPPED | OUTPATIENT
Start: 2025-04-28

## 2025-05-07 ENCOUNTER — LAB ENCOUNTER (OUTPATIENT)
Dept: LAB | Age: 75
End: 2025-05-07
Attending: INTERNAL MEDICINE
Payer: MEDICARE

## 2025-05-07 DIAGNOSIS — R94.39 ABNORMAL STRESS TEST: Primary | ICD-10-CM

## 2025-05-07 LAB
ALBUMIN SERPL-MCNC: 4.9 G/DL (ref 3.2–4.8)
ALBUMIN/GLOB SERPL: 1.7 {RATIO} (ref 1–2)
ALP LIVER SERPL-CCNC: 126 U/L (ref 55–142)
ALT SERPL-CCNC: 15 U/L (ref 10–49)
ANION GAP SERPL CALC-SCNC: 12 MMOL/L (ref 0–18)
AST SERPL-CCNC: 19 U/L (ref ?–34)
BASOPHILS # BLD AUTO: 0.03 X10(3) UL (ref 0–0.2)
BASOPHILS NFR BLD AUTO: 0.3 %
BILIRUB SERPL-MCNC: 0.5 MG/DL (ref 0.2–1.1)
BUN BLD-MCNC: 14 MG/DL (ref 9–23)
CALCIUM BLD-MCNC: 10.1 MG/DL (ref 8.7–10.6)
CHLORIDE SERPL-SCNC: 103 MMOL/L (ref 98–112)
CO2 SERPL-SCNC: 27 MMOL/L (ref 21–32)
CREAT BLD-MCNC: 0.9 MG/DL (ref 0.55–1.02)
EGFRCR SERPLBLD CKD-EPI 2021: 67 ML/MIN/1.73M2 (ref 60–?)
EOSINOPHIL # BLD AUTO: 0.22 X10(3) UL (ref 0–0.7)
EOSINOPHIL NFR BLD AUTO: 2.4 %
ERYTHROCYTE [DISTWIDTH] IN BLOOD BY AUTOMATED COUNT: 16.8 %
FASTING STATUS PATIENT QL REPORTED: NO
GLOBULIN PLAS-MCNC: 2.9 G/DL (ref 2–3.5)
GLUCOSE BLD-MCNC: 127 MG/DL (ref 70–99)
HCT VFR BLD AUTO: 36 % (ref 35–48)
HGB BLD-MCNC: 11.6 G/DL (ref 12–16)
IMM GRANULOCYTES # BLD AUTO: 0.03 X10(3) UL (ref 0–1)
IMM GRANULOCYTES NFR BLD: 0.3 %
LYMPHOCYTES # BLD AUTO: 2.69 X10(3) UL (ref 1–4)
LYMPHOCYTES NFR BLD AUTO: 28.7 %
MCH RBC QN AUTO: 27.4 PG (ref 26–34)
MCHC RBC AUTO-ENTMCNC: 32.2 G/DL (ref 31–37)
MCV RBC AUTO: 84.9 FL (ref 80–100)
MONOCYTES # BLD AUTO: 0.66 X10(3) UL (ref 0.1–1)
MONOCYTES NFR BLD AUTO: 7.1 %
NEUTROPHILS # BLD AUTO: 5.73 X10 (3) UL (ref 1.5–7.7)
NEUTROPHILS # BLD AUTO: 5.73 X10(3) UL (ref 1.5–7.7)
NEUTROPHILS NFR BLD AUTO: 61.2 %
OSMOLALITY SERPL CALC.SUM OF ELEC: 296 MOSM/KG (ref 275–295)
PLATELET # BLD AUTO: 246 10(3)UL (ref 150–450)
POTASSIUM SERPL-SCNC: 4.4 MMOL/L (ref 3.5–5.1)
PROT SERPL-MCNC: 7.8 G/DL (ref 5.7–8.2)
RBC # BLD AUTO: 4.24 X10(6)UL (ref 3.8–5.3)
SODIUM SERPL-SCNC: 142 MMOL/L (ref 136–145)
WBC # BLD AUTO: 9.4 X10(3) UL (ref 4–11)

## 2025-05-07 PROCEDURE — 85025 COMPLETE CBC W/AUTO DIFF WBC: CPT

## 2025-05-07 PROCEDURE — 36415 COLL VENOUS BLD VENIPUNCTURE: CPT

## 2025-05-07 PROCEDURE — 80053 COMPREHEN METABOLIC PANEL: CPT

## 2025-05-15 VITALS — BODY MASS INDEX: 33.96 KG/M2 | WEIGHT: 173 LBS | HEIGHT: 60 IN

## 2025-05-15 RX ORDER — ASPIRIN 81 MG/1
81 TABLET ORAL DAILY
COMMUNITY

## 2025-05-15 NOTE — PAT NURSING NOTE
Per PAT encounter/MyChart message sent to pt/took notes as well:    PreOp Instructions     You are scheduled for: a Cardiac Procedure     Date of Procedure: 05/16/25 Friday     Diet Instructions: Do not eat or drink anything after midnight including gum, mints, candy, etc.     Medications: Medications you are allowed to take can be taken with a sip of water the morning of your procedure, Take Aspirin 81 mg x 4 tablets the day of your procedure     Medications to Stop: Hold herbal supplements and vitamins     Diabetic Medication: Do NOT take another dose of Metformin until instructed to resume by hospital staff.     Skin Prep : Shower with antibacterial soap using a clean washcloth, prior to procedure. Once dried off, no lotions/powders/creams/ointments, etc., Do not shave the procedure area, this will be completed at the hospital during the preparation phase.     Arrival Time: Today you will receive a phone call before 6:00 pm with your arrival time. If you haven't received a phone call, please check your voicemail messages., If you did not receive a voice mail and it is after 6:00 pm, please call the nursing supervisor at [phone].    Driving After Procedure: Sedation will be given so you WILL NOT be able to drive home. You will need a responsible adult  to drive you home. You can NOT take uber or taxi unless approved by your physician in advance.     Discharge Teaching: Your nurse will give you specific instructions before discharge, Most people can resume normal activities in 2-3 days, Any questions, please call the physician's office     Discharge Teaching (Overnight): We recommend you bring an overnight bag that you leave in the car the day of your procedure in case you need to stay overnight. We do not have lockers to lock up belongings so your  would have to hold onto bag while you are in the procedure. Examples would be chargers for electronics you need, toiletries, change of clothes,  etc.      parking is available starting at 6 am or park in the [place] parking garage at [facility]. Check in at the [facility] reception desk. Our  will be there to check you in for your procedure. Please bring your insurance cards and ID with you.                                                                                                                                      Please DO NOT respond to this message, the inbasket is not monitored for messages. For any questions, please call the physician's office.

## 2025-05-16 ENCOUNTER — HOSPITAL ENCOUNTER (OUTPATIENT)
Dept: INTERVENTIONAL RADIOLOGY/VASCULAR | Facility: HOSPITAL | Age: 75
Discharge: HOME | End: 2025-05-16
Attending: INTERNAL MEDICINE
Payer: MEDICARE

## 2025-05-16 ENCOUNTER — HOSPITAL ENCOUNTER (OUTPATIENT)
Dept: INTERVENTIONAL RADIOLOGY/VASCULAR | Facility: HOSPITAL | Age: 75
Discharge: HOME | End: 2025-05-16
Attending: INTERNAL MEDICINE | Admitting: INTERNAL MEDICINE
Payer: MEDICARE

## 2025-05-16 VITALS
SYSTOLIC BLOOD PRESSURE: 153 MMHG | HEART RATE: 78 BPM | RESPIRATION RATE: 14 BRPM | TEMPERATURE: 98 F | DIASTOLIC BLOOD PRESSURE: 67 MMHG | OXYGEN SATURATION: 95 %

## 2025-05-16 DIAGNOSIS — R94.39 ABNORMAL STRESS TEST: ICD-10-CM

## 2025-05-16 DIAGNOSIS — R93.89 ABNORMAL COMPUTED TOMOGRAPHY ANGIOGRAPHY (CTA): ICD-10-CM

## 2025-05-16 PROCEDURE — 92978 ENDOLUMINL IVUS OCT C 1ST: CPT | Performed by: INTERNAL MEDICINE

## 2025-05-16 PROCEDURE — 93010 ELECTROCARDIOGRAM REPORT: CPT | Performed by: INTERNAL MEDICINE

## 2025-05-16 PROCEDURE — 99152 MOD SED SAME PHYS/QHP 5/>YRS: CPT | Performed by: INTERNAL MEDICINE

## 2025-05-16 PROCEDURE — 99153 MOD SED SAME PHYS/QHP EA: CPT | Performed by: INTERNAL MEDICINE

## 2025-05-16 PROCEDURE — 93458 L HRT ARTERY/VENTRICLE ANGIO: CPT | Performed by: INTERNAL MEDICINE

## 2025-05-16 PROCEDURE — 85347 COAGULATION TIME ACTIVATED: CPT

## 2025-05-16 PROCEDURE — 93005 ELECTROCARDIOGRAM TRACING: CPT

## 2025-05-16 PROCEDURE — 82962 GLUCOSE BLOOD TEST: CPT

## 2025-05-16 PROCEDURE — 93799 UNLISTED CV SVC/PROCEDURE: CPT | Performed by: INTERNAL MEDICINE

## 2025-05-16 RX ORDER — MIDAZOLAM HYDROCHLORIDE 1 MG/ML
INJECTION INTRAMUSCULAR; INTRAVENOUS
Status: COMPLETED
Start: 2025-05-16 | End: 2025-05-16

## 2025-05-16 RX ORDER — ACETAMINOPHEN 500 MG
1000 TABLET ORAL EVERY 6 HOURS PRN
Status: DISCONTINUED | OUTPATIENT
Start: 2025-05-16 | End: 2025-05-16

## 2025-05-16 RX ORDER — SODIUM CHLORIDE 9 MG/ML
INJECTION, SOLUTION INTRAVENOUS
Status: DISCONTINUED | OUTPATIENT
Start: 2025-05-17 | End: 2025-05-16 | Stop reason: HOSPADM

## 2025-05-16 RX ORDER — ACETAMINOPHEN 500 MG
TABLET ORAL
Status: COMPLETED
Start: 2025-05-16 | End: 2025-05-16

## 2025-05-16 RX ORDER — ACETAMINOPHEN 500 MG
500 TABLET ORAL EVERY 6 HOURS PRN
Status: DISCONTINUED | OUTPATIENT
Start: 2025-05-16 | End: 2025-05-16

## 2025-05-16 RX ORDER — SODIUM CHLORIDE 9 MG/ML
INJECTION, SOLUTION INTRAVENOUS CONTINUOUS
Status: DISCONTINUED | OUTPATIENT
Start: 2025-05-16 | End: 2025-05-16

## 2025-05-16 RX ORDER — VERAPAMIL HYDROCHLORIDE 2.5 MG/ML
INJECTION INTRAVENOUS
Status: COMPLETED
Start: 2025-05-16 | End: 2025-05-16

## 2025-05-16 RX ORDER — LIDOCAINE HYDROCHLORIDE 10 MG/ML
INJECTION, SOLUTION EPIDURAL; INFILTRATION; INTRACAUDAL; PERINEURAL
Status: COMPLETED
Start: 2025-05-16 | End: 2025-05-16

## 2025-05-16 RX ORDER — ASPIRIN 81 MG/1
81 TABLET ORAL DAILY
Status: DISCONTINUED | OUTPATIENT
Start: 2025-05-17 | End: 2025-05-16

## 2025-05-16 RX ORDER — HEPARIN SODIUM 5000 [USP'U]/ML
INJECTION, SOLUTION INTRAVENOUS; SUBCUTANEOUS
Status: COMPLETED
Start: 2025-05-16 | End: 2025-05-16

## 2025-05-16 RX ORDER — NITROGLYCERIN 20 MG/100ML
INJECTION INTRAVENOUS
Status: COMPLETED
Start: 2025-05-16 | End: 2025-05-16

## 2025-05-16 RX ADMIN — ACETAMINOPHEN 500 MG: 500 MG TABLET ORAL at 19:18:00

## 2025-05-16 NOTE — PROCEDURES
Cardiac Cath Procedure Note      Javi Pratt Patient Status:  Outpatient    1950 MRN [id number]   [facility] INTERVENTIONAL SUITES Attending Anila Garcia MD   Hosp Day # 0 PCP Angelica Austin MD       Cardiologist: Anila Garcia MD  Primary Proceduralist: Anila Garcia MD  Procedure Performed: LHC, COR, and IVUS RCA, PCI to ostial RCA with a 3.5x18mm Suhail HAZEL reducing 80% stenosis to 0% residual, GIULIANO-3 flow GIULIANO-3 flow, IFR of the LAD  Date of Procedure: 2025   Indication: Angina [place] class III, abnormal CT of the coronaries    Consent:   Informed written consent was obtained from the patient after risk benefits and alternative explained the patient.  Patient agreed to proceed    Sedation  IV conscious sedation was achieved with Versed and fentanyl.  It was administered under my direct supervision.  Hemodynamic monitoring took place throughout the entire procedure by myself in the Cath Lab staff.        Description of the procedure: After written informed consent was obtained from the patient, patient was brought to the cardiac catheterization laboratory in a stable condition and fasting state.  Patient was prepped and draped in the usual sterile fashion.  IV conscious sedation was achieved with Versed and fentanyl.  Lidocaine 1% was used to infiltrate the right radial artery for local anesthesia and a 6 Zambian introducer sheath was inserted into the right radial artery.  A 5 Zambian TIG 4.0 catheter was advanced into the ascending aorta.  It was used to cross the aortic valve.  Left ventricular pressures were obtained.  Using pullback technique a transaortic valvular gradient was obtained.  We then selectively engage the right and left coronary system.  Multiple images of the left and right coronary system were obtained using nonionic contrast media.  The catheter was then disengaged and removed from the body over an 035 wire.  We then advanced a 6 Zambian EBU 3.5 catheter and  used to engage the left main.  We then advanced the IFR wire into the LAD and performed IFR.  It returned 0.94 which is not significant.  We then disengaged removed from the body over an 035 wire and exchanged for a 6 Congolese JR4 catheter.  We then advanced a Linda blue wire into the distal right coronary artery followed by the IVUS catheter.  This was used to properly sized the vessel.  This was then exchanged for 3.0 semicompliant balloon and inflated at the ostium.  This was then exchanged for 3.5 x 18 mm Rosser drug-eluting stent which was placed along the ostium and inflated at nominal pressure.  We then exchanged for 3.5 noncompliant balloon which was used to post dilate the stent.  The balloon was then removed.  Subsequent angiography showed excellent angiographic results with reduction of 80% stenosis 0% residual, GIULIANO-3 flow GIULIANO-3 flow.  All wires and catheters were removed from the body.  Using a TR band the 6 Congolese sheath was removed achieving hemostasis.  Heparin was the anticoagulation of choice used in the procedure achieving appropriate ACT.     Specimen sent to: No specimen collected  Estimated blood loss: 10 cc  Closure:  TR band    Left Ventriculography and hemodynamics:     LV EDP 8mmHg  No gradient across aortic valve        Coronary Angiography  RCA:  ostial 80% stenosis, dominant, rPDA normal    Left main:  widely patent    Left anterior descending:  mid 60% stenosis, IFR 0.94, not significant, very tortuous distal to this segment, D1: ostial 80-90% stenosis, medium size vessel, very tortuous    Circumflex:  tortuous, mild irregularities      Assessment:  Successful PCI to the ostial right coronary artery with a 3.5 x 18 mm Rosser drug-eluting stent reducing 80% stenosis to 0% residual, GIULIANO-3 flow GIULIANO-3 flow  IVUS of the right coronary  IFR of the LAD      Recommendations:  Dual antiplatelet therapy including aspirin and Brilinta 90 mg twice daily  High intensity statin  Cardiac rehab phase 2  referral  Follow-up in the office in the next week          Anila Garcia MD  05/16/25

## 2025-05-16 NOTE — PROGRESS NOTES
Pt s/p LHC via right radial artery. Pt received from cath lab with right radial TR band in place. Right wrist is soft, no bleeding noted and palpable right radial pulse. VSS. Pt awake and tolerating PO. EKG done. Email sent to cardiac rehab regarding new consult. Prescription for Brillinta sent to patient's pharmacy. Report given to Alba ESCOBAR RN and wrist assessed together at bedside.

## 2025-05-16 NOTE — DIETARY NOTE
Clinical Nutrition    Dietitian consult received per cardiac rehab standing order. Pt to be educated by cardiac rehab staff and encouraged to attend outpatient classes taught by RD. RD available PRN.    Kiara Ta MS, RD, LDN  Clinical Dietitian  Ext: [phone]

## 2025-05-16 NOTE — DISCHARGE INSTRUCTIONS
HOME CARE INSTRUCTIONS FOLLOWING CORONARY ANGIOGRAPHY,  PERIPHERAL ANGIOGRAPHY, ANGIOPLASTY (PTCA/PTA) OR INSERTION  OF STENT IN THE CORONARY, CAROTID, AND/OR PERIPHERAL ARTERIES      Activity:   DO NOT drive after the procedure. You may resume driving late the following day according to the nurse or physician’s instructions   Plan on resting and relaxing tonight and tomorrow   Resume your normal activity after 48 hours, or as instructed by your physician   Do not lift anything over 10 pounds for the next 24 hours   Avoid sexual activity for the next 24 hours   Avoid drinking alcohol for the next 24 hours   If the wrist was used, avoid bending/flexing of the wrist for the next 24 hours.       What is Normal?   A small lump at the procedure site associated with mild tenderness when touched   The procedure site may be bruised or discolored   There may be a small amount of drainage on the bandage    Special Instructions:   Drink plenty of fluids during the next 24 hours to “flush” the contrast from your system   The bandage is to remain in place for 24 hours   Keep the bandage clean and dry   DO NOT submerge the procedure site for 72 hours (no bath tubs or pools). This includes dishwashing/submersion of the wrist, if the wrist was used   After 24 hours, you must remove the bandage   You should shower after removing the bandage, and wash the procedure site gently with soap and water   If you choose to wear a bandage for a few days, make sure it remains clean and dry and that it is changed daily    When you should NOTIFY YOUR PHYSICIAN:   Bleeding can occur at the procedure site - both on the outside of the skin and/or beneath the surface of the skin   Swelling or a large lump at the procedure site can occur, which may be accompanied by moderate to severe pain. If either of the above occurs, lie down flat. Have someone apply pressure to the procedure site with both hands, as instructed by the nurse. Hold pressure for 20  minutes and the bleeding should stop. Notify your physician of the occurrence. If the bleeding does not stop, call 911 and continue to apply pressure   If you experience signs of a fever, temperature >101 degrees, chills, infection (redness, swelling, thick yellow drainage, or a foul odor from the procedure site)   If you notice any numbness, tingling, or loss of feeling to your fingers or hand, if wrist access was utilized    If you Received a Stent:  - You will remain on an antiplatelet drug and/or aspirin. Antiplatelet medications are usually taken for six months to one year and should not be stopped unless your cardiologist directs you to do so. These medications help to prevent blockage at the stent site. If another physician or dentist asks you to stop your antiplatelet medication, you need to consult your cardiologist first. Together, your cardiologist and other physician can discuss the risks that may be involved if you are not taking the antiplatelet medication.   - If a MRI is necessary, it may be done 4-6 weeks after your procedure. Verify this with your cardiologist.  - Keep the stent card with you at all times! If you need a MRI in the future, your stent card will need to be shown to the technologist before performing the MRI. A duplicate card CANNOT be reproduced.     Other:  - You may resume your present diet, unless otherwise specified by your physician.   - You may resume all of your medications as prescribed, unless otherwise directed by your physician. A list of your medications was provided to you at discharge.  - Please call your physician's office for a follow-up appointment. You should be seen in 2 weeks.  - Do not make any personal/business decisions and/or sign any legal documents for the next 24 hours.

## 2025-05-17 NOTE — PROGRESS NOTES
Assumed care of pt s/p PCI via R Radial artery with TR Band placed to site-air released per protocol with no bleeding or hematoma noted. After TR Band removed-pt developed hematoma-manual pressure X15 min with resolution. Pt c/o pain to her R arm-site ecchymotic but soft. Good pleth per monitor.Pt given ES tylenol ( pt only wanted to take 1) with resolution prior to discharge.VSS. Tolerated PO intake. Ambulated without difficulty. IV d/c'd. Discharge instructions given, including stent card and new prescription for Brilinta-pt verbalized understanding. Pt to lobby via WC and friend to drive home.

## 2025-05-18 LAB
ATRIAL RATE: 71 BPM
P AXIS: 52 DEGREES
P-R INTERVAL: 182 MS
Q-T INTERVAL: 402 MS
QRS DURATION: 84 MS
QTC CALCULATION (BEZET): 436 MS
R AXIS: -32 DEGREES
T AXIS: 18 DEGREES
VENTRICULAR RATE: 71 BPM

## 2025-05-19 LAB — GLUCOSE BLD-MCNC: 139 MG/DL (ref 70–99)

## 2025-05-20 LAB — ISTAT ACTIVATED CLOTTING TIME: 302 SECONDS (ref 74–137)

## 2025-05-28 ENCOUNTER — OFFICE VISIT (OUTPATIENT)
Dept: INTERNAL MEDICINE CLINIC | Facility: CLINIC | Age: 75
End: 2025-05-28
Payer: MEDICARE

## 2025-05-28 VITALS
HEIGHT: 60 IN | HEART RATE: 71 BPM | BODY MASS INDEX: 34.28 KG/M2 | WEIGHT: 174.63 LBS | OXYGEN SATURATION: 99 % | DIASTOLIC BLOOD PRESSURE: 64 MMHG | SYSTOLIC BLOOD PRESSURE: 126 MMHG | TEMPERATURE: 98 F

## 2025-05-28 DIAGNOSIS — E11.69 TYPE 2 DIABETES MELLITUS WITH OBESITY (HCC): ICD-10-CM

## 2025-05-28 DIAGNOSIS — I25.118 CORONARY ARTERY DISEASE OF NATIVE ARTERY OF NATIVE HEART WITH STABLE ANGINA PECTORIS: Primary | ICD-10-CM

## 2025-05-28 DIAGNOSIS — E78.5 HYPERLIPIDEMIA ASSOCIATED WITH TYPE 2 DIABETES MELLITUS (HCC): ICD-10-CM

## 2025-05-28 DIAGNOSIS — E66.9 TYPE 2 DIABETES MELLITUS WITH OBESITY (HCC): ICD-10-CM

## 2025-05-28 DIAGNOSIS — Z98.61 HISTORY OF PERCUTANEOUS CORONARY INTERVENTION: ICD-10-CM

## 2025-05-28 DIAGNOSIS — E11.69 HYPERLIPIDEMIA ASSOCIATED WITH TYPE 2 DIABETES MELLITUS (HCC): ICD-10-CM

## 2025-05-28 DIAGNOSIS — R06.09 DOE (DYSPNEA ON EXERTION): ICD-10-CM

## 2025-05-28 DIAGNOSIS — E11.59 HYPERTENSION ASSOCIATED WITH DIABETES (HCC): ICD-10-CM

## 2025-05-28 DIAGNOSIS — I15.2 HYPERTENSION ASSOCIATED WITH DIABETES (HCC): ICD-10-CM

## 2025-05-28 LAB — HEMOGLOBIN A1C: 7.1 % (ref 4.3–5.6)

## 2025-05-28 PROCEDURE — 99214 OFFICE O/P EST MOD 30 MIN: CPT | Performed by: INTERNAL MEDICINE

## 2025-05-28 PROCEDURE — G2211 COMPLEX E/M VISIT ADD ON: HCPCS | Performed by: INTERNAL MEDICINE

## 2025-05-28 PROCEDURE — 1159F MED LIST DOCD IN RCRD: CPT | Performed by: INTERNAL MEDICINE

## 2025-05-28 PROCEDURE — 3074F SYST BP LT 130 MM HG: CPT | Performed by: INTERNAL MEDICINE

## 2025-05-28 PROCEDURE — 83036 HEMOGLOBIN GLYCOSYLATED A1C: CPT | Performed by: INTERNAL MEDICINE

## 2025-05-28 PROCEDURE — 3051F HG A1C>EQUAL 7.0%<8.0%: CPT | Performed by: INTERNAL MEDICINE

## 2025-05-28 PROCEDURE — 3008F BODY MASS INDEX DOCD: CPT | Performed by: INTERNAL MEDICINE

## 2025-05-28 PROCEDURE — 3078F DIAST BP <80 MM HG: CPT | Performed by: INTERNAL MEDICINE

## 2025-05-28 PROCEDURE — 1160F RVW MEDS BY RX/DR IN RCRD: CPT | Performed by: INTERNAL MEDICINE

## 2025-05-28 RX ORDER — TICAGRELOR 90 MG/1
90 TABLET, FILM COATED ORAL EVERY 12 HOURS
Qty: 180 TABLET | Refills: 1 | Status: SHIPPED | OUTPATIENT
Start: 2025-05-28

## 2025-05-28 NOTE — PROGRESS NOTES
Javi Pratt is a 75 year old female.     HPI:   Patient presents for the following issues.  DOWNS - evaluation of her symptoms revealed abnormal stress test. Subsequent CTA of her coronaries shows a potentially significant stenosis of her mid LAD. Subsequent LHC on 5/26/2025 - Successful PCI to the ostial right coronary artery with a 3.5 x 18 mm Suhail drug-eluting stent reducing 80% stenosis to 0% residual, GIULIANO-3 flow GIULIANO-3 flow. Cardiology recommends dual antiplatelet therapy including aspirin and Brilinta 90 mg twice daily, high intensity statin, and cardiac rehab phase 2 referral. Compared to March, 2025 her DOWNS is is no better.   Radial approach - via right wrist. Her pain is slowly improving  Goals of care - she has friends to help with caring for her and staying with her. She is primary caregiver of her .   DM - checking her sugar 2-3 times/week. FBS are less than 140s. Denies hypoglycemia.   HTN  - not checking pressures at home.       REVIEW OF SYSTEMS:   GENERAL HEALTH: feels well otherwise. No f/c  NEURO: denies any headaches, LH, dizzyness, LOC, falls  VISION: denies any blurred or double vision  RESPIRATORY: denies shortness of breath, cough, or congestion  CARDIOVASCULAR: denies chest pain, pressure or palpitations  GI: denies abdominal pain, constipation, diarrhea, n/v, BRBPR, melena  : no dysuria or hematuria or vaginal bleeding  PSYCH: mood is stable. Denies depression or anxiety. She is coping with her stress well.   EXT: denies edema       Wt Readings from Last 6 Encounters:   05/28/25 174 lb 9.6 oz (79.2 kg)   05/15/25 173 lb (78.5 kg)   03/24/25 174 lb 9.6 oz (79.2 kg)   03/19/25 173 lb 12.8 oz (78.8 kg)   03/10/25 176 lb (79.8 kg)   02/24/25 176 lb 3.2 oz (79.9 kg)       No Known Allergies    Family History   Problem Relation Age of Onset    Cancer Father 70        \"Blood cancer\"    Diabetes Mother         with ESRD.    Diabetes Brother     Breast Cancer Self         49     DCIS Self       Past Medical History:    Breast CA (HCC)    left breast    Breast cancer in female (HCC)    s/p chemo and radiation    Diabetes (HCC)    Ductal carcinoma in situ of breast    Exposure to medical diagnostic radiation    High blood pressure    High cholesterol    Osteoarthritis    Personal history of antineoplastic chemotherapy      Past Surgical History:   Procedure Laterality Date    Chemotherapy Left 1990    6 months    Lumpectomy left Left 1990    breast cancer with chemo/radiation    Mastectomy left  12/2020    IDC, DCIS    Needle biopsy left Left 1990    Radiation left  1990    1 1/2 months      Social History:    Social History     Socioeconomic History    Marital status:    Tobacco Use    Smoking status: Never    Smokeless tobacco: Never   Vaping Use    Vaping status: Never Used   Substance and Sexual Activity    Alcohol use: No     Alcohol/week: 0.0 standard drinks of alcohol    Drug use: No   Other Topics Concern    Caffeine Concern No    Exercise Yes    Seat Belt Yes    Special Diet No    Stress Concern No    Weight Concern Yes   Social History Narrative    ** Merged History Encounter **                EXAM:   /64 (BP Location: Left arm, Patient Position: Sitting, Cuff Size: large)   Pulse 71   Temp 98.3 °F (36.8 °C) (Temporal)   Ht 5' (1.524 m)   Wt 174 lb 9.6 oz (79.2 kg)   SpO2 99%   BMI 34.10 kg/m²   GENERAL: A&O well developed, well nourished, easily winded when walking and climbing onto examining table.   SKIN: no rashes,no suspicious lesions  HEENT: atraumatic, MMM, throat is clear  NECK: supple, no jvd, no thyromegaly  LUNGS: clear to auscultation bilateraly, no c/w/r  CARDIO: RRR without g/m/r  GI: softly distended, obese, non-tender, no HSM ,normal BS throughout.   NEURO: CN 2-12 grossly intact  PSYCH: pleasant  EXTREMITIES: no cyanosis, clubbing or edema      ASSESSMENT AND PLAN:   # DOWNS - likely due to CADz, see management below. If her DOWNS does not improve in  3 months then will need pulmonary evaluation.   # CADz s/p PCI to the ostial right coronary artery on 5/16/2025 - Cardiology recommends dual antiplatelet therapy including aspirin and Brilinta 90 mg twice daily, high intensity statin, and cardiac rehab phase 2 referral.  # Bilateral Knee Pain 2/2 OA: improving a bit since getting injections. Cont HEP. Tramadol was too strong. She does not want to use meloxicam.   # HTN associated with DM, white coat syndrome: controlled on norvasc and irbesartan. Home machine is accurate.   # Left Breast Cancer, s/p left mastectomy: previous left breast carcinoma 1990, status post lumpectomy/RT/6 months of chemo/5 years of AI.  Recurrent left breast carcinoma diagnosed after abnormal screening mammogram 10/2020. - Started anastrozole January 2021, continue until January, 2026    # HLP associated with DM: at goal on atorvastatin.   # Type 2 Diabetes in Obese, BMI 32: at goal in May, 2025  - continue to reinforce importance of weight loss, carb controlled diet, and exercise.   - Cont metformin 500 mg once daily. Higher dose causes abdominal pain.   - DM Eye Exam - done on 4/10/2025 by Adam Mason. No DM retinopathy in either eye  - Foot Exam: done 2025   - LDL: see above  - BP: see above  - micro alb:creat ordered   - Depression Screen: done 2025   # GERD: has been told she has duodenal erythema on past EGDs in [place]. Cont PPI.   # Osteopenia of lumbar spine - low FRAX per DEXA in 2023. educated on dietary calcium/vit D3, weight bearing exercises, fall prevention.   # Health Maintenance: medicare supervisit on February, 2025  Colon Cancer Screening: Cont to decline c-scope, FIT or cologuard.   Breast Cancer Screening: continue yearly mammogram.   Bone Health: see above  Vaccines: Up to date on pneumovac 23 and prevnar 13. TDAP 2017. Advised on shingrix, rsv, flu, and covid.   Medical POA: Brother, William Pratt  Hepatitis C Screen: AB neg 2019       The patient indicates  understanding of these issues and agrees to the plan.  The patient is asked to return in 6 months pending progress of her DOWNS.   Angelica Austin MD

## 2025-05-28 NOTE — PATIENT INSTRUCTIONS
Please complete your blood work in the next one month. You do not need to fast. Please give a urine sample.

## 2025-06-03 ENCOUNTER — LAB ENCOUNTER (OUTPATIENT)
Dept: LAB | Age: 75
End: 2025-06-03
Attending: INTERNAL MEDICINE
Payer: MEDICARE

## 2025-06-03 DIAGNOSIS — C50.912 INVASIVE DUCTAL CARCINOMA OF BREAST, FEMALE, LEFT (HCC): ICD-10-CM

## 2025-06-03 DIAGNOSIS — I15.2 HYPERTENSION ASSOCIATED WITH DIABETES (HCC): ICD-10-CM

## 2025-06-03 DIAGNOSIS — E11.9 TYPE 2 DIABETES MELLITUS WITHOUT COMPLICATION, WITHOUT LONG-TERM CURRENT USE OF INSULIN (HCC): ICD-10-CM

## 2025-06-03 DIAGNOSIS — E11.59 HYPERTENSION ASSOCIATED WITH DIABETES (HCC): ICD-10-CM

## 2025-06-03 DIAGNOSIS — E78.5 HYPERLIPIDEMIA ASSOCIATED WITH TYPE 2 DIABETES MELLITUS (HCC): ICD-10-CM

## 2025-06-03 DIAGNOSIS — E11.69 HYPERLIPIDEMIA ASSOCIATED WITH TYPE 2 DIABETES MELLITUS (HCC): ICD-10-CM

## 2025-06-03 DIAGNOSIS — E11.69 TYPE 2 DIABETES MELLITUS WITH OBESITY (HCC): ICD-10-CM

## 2025-06-03 DIAGNOSIS — E66.9 TYPE 2 DIABETES MELLITUS WITH OBESITY (HCC): ICD-10-CM

## 2025-06-03 LAB
ALT SERPL-CCNC: 16 U/L (ref 10–49)
ANION GAP SERPL CALC-SCNC: 12 MMOL/L (ref 0–18)
AST SERPL-CCNC: 21 U/L (ref ?–34)
BASOPHILS # BLD AUTO: 0.03 X10(3) UL (ref 0–0.2)
BASOPHILS NFR BLD AUTO: 0.3 %
BUN BLD-MCNC: 13 MG/DL (ref 9–23)
CALCIUM BLD-MCNC: 10 MG/DL (ref 8.7–10.6)
CHLORIDE SERPL-SCNC: 100 MMOL/L (ref 98–112)
CHOLEST SERPL-MCNC: 141 MG/DL (ref ?–200)
CO2 SERPL-SCNC: 24 MMOL/L (ref 21–32)
CREAT BLD-MCNC: 0.95 MG/DL (ref 0.55–1.02)
CREAT UR-SCNC: 110.5 MG/DL
DEPRECATED HBV CORE AB SER IA-ACNC: 43 NG/ML (ref 50–306)
EGFRCR SERPLBLD CKD-EPI 2021: 63 ML/MIN/1.73M2 (ref 60–?)
EOSINOPHIL # BLD AUTO: 0.24 X10(3) UL (ref 0–0.7)
EOSINOPHIL NFR BLD AUTO: 2.4 %
ERYTHROCYTE [DISTWIDTH] IN BLOOD BY AUTOMATED COUNT: 15.9 %
EST. AVERAGE GLUCOSE BLD GHB EST-MCNC: 169 MG/DL (ref 68–126)
FASTING PATIENT LIPID ANSWER: YES
FASTING STATUS PATIENT QL REPORTED: YES
GLUCOSE BLD-MCNC: 98 MG/DL (ref 70–99)
HBA1C MFR BLD: 7.5 % (ref ?–5.7)
HCT VFR BLD AUTO: 34.7 % (ref 35–48)
HDLC SERPL-MCNC: 64 MG/DL (ref 40–59)
HGB BLD-MCNC: 11.1 G/DL (ref 12–16)
IMM GRANULOCYTES # BLD AUTO: 0.06 X10(3) UL (ref 0–1)
IMM GRANULOCYTES NFR BLD: 0.6 %
LDLC SERPL CALC-MCNC: 59 MG/DL (ref ?–100)
LYMPHOCYTES # BLD AUTO: 2.1 X10(3) UL (ref 1–4)
LYMPHOCYTES NFR BLD AUTO: 21.2 %
MCH RBC QN AUTO: 26.9 PG (ref 26–34)
MCHC RBC AUTO-ENTMCNC: 32 G/DL (ref 31–37)
MCV RBC AUTO: 84 FL (ref 80–100)
MICROALBUMIN UR-MCNC: 1.9 MG/DL
MICROALBUMIN/CREAT 24H UR-RTO: 17.2 UG/MG (ref ?–30)
MONOCYTES # BLD AUTO: 0.64 X10(3) UL (ref 0.1–1)
MONOCYTES NFR BLD AUTO: 6.5 %
NEUTROPHILS # BLD AUTO: 6.84 X10 (3) UL (ref 1.5–7.7)
NEUTROPHILS # BLD AUTO: 6.84 X10(3) UL (ref 1.5–7.7)
NEUTROPHILS NFR BLD AUTO: 69 %
NONHDLC SERPL-MCNC: 77 MG/DL (ref ?–130)
OSMOLALITY SERPL CALC.SUM OF ELEC: 282 MOSM/KG (ref 275–295)
PLATELET # BLD AUTO: 302 10(3)UL (ref 150–450)
POTASSIUM SERPL-SCNC: 4.5 MMOL/L (ref 3.5–5.1)
RBC # BLD AUTO: 4.13 X10(6)UL (ref 3.8–5.3)
SODIUM SERPL-SCNC: 136 MMOL/L (ref 136–145)
TRIGL SERPL-MCNC: 99 MG/DL (ref 30–149)
VLDLC SERPL CALC-MCNC: 15 MG/DL (ref 0–30)
WBC # BLD AUTO: 9.9 X10(3) UL (ref 4–11)

## 2025-06-03 PROCEDURE — 84460 ALANINE AMINO (ALT) (SGPT): CPT

## 2025-06-03 PROCEDURE — 84450 TRANSFERASE (AST) (SGOT): CPT

## 2025-06-03 PROCEDURE — 80048 BASIC METABOLIC PNL TOTAL CA: CPT

## 2025-06-03 PROCEDURE — 80061 LIPID PANEL: CPT

## 2025-06-03 PROCEDURE — 83036 HEMOGLOBIN GLYCOSYLATED A1C: CPT

## 2025-06-03 PROCEDURE — 82043 UR ALBUMIN QUANTITATIVE: CPT

## 2025-06-03 PROCEDURE — 82728 ASSAY OF FERRITIN: CPT

## 2025-06-03 PROCEDURE — 82570 ASSAY OF URINE CREATININE: CPT

## 2025-06-03 PROCEDURE — 85025 COMPLETE CBC W/AUTO DIFF WBC: CPT

## 2025-06-03 PROCEDURE — 36415 COLL VENOUS BLD VENIPUNCTURE: CPT

## 2025-06-04 DIAGNOSIS — D64.9 NORMOCYTIC ANEMIA: Primary | ICD-10-CM

## 2025-06-04 DIAGNOSIS — D50.0 IRON DEFICIENCY ANEMIA DUE TO CHRONIC BLOOD LOSS: ICD-10-CM

## 2025-06-10 ENCOUNTER — TELEPHONE (OUTPATIENT)
Dept: INTERNAL MEDICINE CLINIC | Facility: CLINIC | Age: 75
End: 2025-06-10

## 2025-06-10 NOTE — TELEPHONE ENCOUNTER
Referral Request - Please ensure insurance is updated and verified.    Reason for the referral: cardiac rehab center    Has the patient been seen by their PCP for this condition (if not, schedule an appointment): Has scheduled for 6/20  Is an authorized or pending referral in [facility]? No   -If yes, notify the patient of the status and/or fax per patient request.    Provider, specialty and contact information:   -Name: PT does not know  -[facility] [address]  -Phone: [phone]  -Fax:     Has the patient seen this specialist in the past?: No  CPT Code for this visit:   Dx Code:   Number of visits requested: 33 visits  Date of appointment, if scheduled (route as high priority if within 1 week): wouldn't allow scheduling w/ out referral  If requesting out of network provider, please provide reason: OON    Please call pt when referral has been authorized.    Notify the patient that referrals can take up to 1 week or sometimes longer for authorization.

## 2025-06-11 NOTE — TELEPHONE ENCOUNTER
Patient has received referral for cardiac rehab from cardiologist and no longer needs a referral from us. To see Dr. Austin as below:    Future Appointments   Date Time Provider Department Center   6/20/2025  1:30 PM Angelica Austin MD [facility] [place]

## 2025-06-20 ENCOUNTER — OFFICE VISIT (OUTPATIENT)
Dept: INTERNAL MEDICINE CLINIC | Facility: CLINIC | Age: 75
End: 2025-06-20
Payer: MEDICARE

## 2025-06-20 VITALS
HEART RATE: 84 BPM | DIASTOLIC BLOOD PRESSURE: 60 MMHG | SYSTOLIC BLOOD PRESSURE: 120 MMHG | TEMPERATURE: 98 F | HEIGHT: 60 IN | WEIGHT: 174.63 LBS | BODY MASS INDEX: 34.28 KG/M2 | OXYGEN SATURATION: 96 %

## 2025-06-20 DIAGNOSIS — R30.0 DYSURIA: ICD-10-CM

## 2025-06-20 DIAGNOSIS — E66.9 TYPE 2 DIABETES MELLITUS WITH OBESITY (HCC): ICD-10-CM

## 2025-06-20 DIAGNOSIS — E11.9 TYPE 2 DIABETES MELLITUS WITHOUT COMPLICATION, WITHOUT LONG-TERM CURRENT USE OF INSULIN (HCC): ICD-10-CM

## 2025-06-20 DIAGNOSIS — E11.69 TYPE 2 DIABETES MELLITUS WITH OBESITY (HCC): ICD-10-CM

## 2025-06-20 DIAGNOSIS — I25.118 CORONARY ARTERY DISEASE OF NATIVE ARTERY OF NATIVE HEART WITH STABLE ANGINA PECTORIS: ICD-10-CM

## 2025-06-20 DIAGNOSIS — R06.09 DOE (DYSPNEA ON EXERTION): Primary | ICD-10-CM

## 2025-06-20 DIAGNOSIS — R14.0 ABDOMINAL BLOATING: ICD-10-CM

## 2025-06-20 LAB
APPEARANCE: CLEAR
BILIRUBIN: NEGATIVE
GLUCOSE (URINE DIPSTICK): NEGATIVE MG/DL
KETONES (URINE DIPSTICK): NEGATIVE MG/DL
LEUKOCYTES: NEGATIVE
NITRITE, URINE: NEGATIVE
OCCULT BLOOD: NEGATIVE
PH, URINE: 5.5 (ref 4.5–8)
PROTEIN (URINE DIPSTICK): NEGATIVE MG/DL
SPECIFIC GRAVITY: 1.02 (ref 1–1.03)
URINE-COLOR: YELLOW
UROBILINOGEN,SEMI-QN: 0.2 MG/DL (ref 0–1.9)

## 2025-06-20 RX ORDER — RANOLAZINE 500 MG/1
500 TABLET, EXTENDED RELEASE ORAL 2 TIMES DAILY
COMMUNITY
Start: 2025-05-29

## 2025-06-20 RX ORDER — CLOPIDOGREL BISULFATE 75 MG/1
75 TABLET ORAL DAILY
COMMUNITY
Start: 2025-05-29

## 2025-06-20 RX ORDER — METOPROLOL SUCCINATE 25 MG/1
25 TABLET, EXTENDED RELEASE ORAL DAILY
COMMUNITY
Start: 2025-06-03

## 2025-06-20 RX ORDER — FERROUS SULFATE 325(65) MG
325 TABLET, DELAYED RELEASE (ENTERIC COATED) ORAL
COMMUNITY

## 2025-06-20 NOTE — PATIENT INSTRUCTIONS
Please take omeprazole thirty minutes prior to breakfast.     Please let me know if our medication list is not accurate.

## 2025-06-20 NOTE — PROGRESS NOTES
Javi Pratt is a 75 year old female.     HPI:   Patient presents for the following issues.  DM - A1C through lab was 7.6 but bedside A1C was 7.1.   Iron deficiency - she has started iron replacement and dnies s/e. No bleeding  CADz, s/p PCI - she is not sure if she is taking ranexa. Cardiology changed her to plavix, stopped norvasc and added metoprolol. She seems to be tolerating them well. Denies chest pain.   DOWNS - it is stable since prior to PCI. Placement of PCI has not helped. She feels shortness of breath even when speaking.   GERD - she is taking omeprazole after breakfast rather than before. She is experiencing early satiety, no abdominal pain, she feels abdominal gassiness. Denies constipation, diarrhea, has daily BMs. No melena or bloody stools.       REVIEW OF SYSTEMS:   GENERAL HEALTH: feels well otherwise. No f/c  NEURO: denies any headaches, LH, dizzyness, LOC, falls  VISION: denies any blurred or double vision  RESPIRATORY: denies cough or wheezing.   CARDIOVASCULAR: denies chest pain, pressure or palpitations  GI: see HPI  : no hematuria or vaginal bleeding. Dysuria started 1 week ago.  PSYCH: mood is stable. No depression or anxiety.   EXT: denies edema     Wt Readings from Last 6 Encounters:   06/20/25 174 lb 9.6 oz (79.2 kg)   05/28/25 174 lb 9.6 oz (79.2 kg)   05/15/25 173 lb (78.5 kg)   03/24/25 174 lb 9.6 oz (79.2 kg)   03/19/25 173 lb 12.8 oz (78.8 kg)   03/10/25 176 lb (79.8 kg)       No Known Allergies    Family History   Problem Relation Age of Onset    Cancer Father 70        \"Blood cancer\"    Diabetes Mother         with ESRD.    Diabetes Brother     Breast Cancer Self         49    DCIS Self       Past Medical History:    Breast CA (HCC)    left breast    Breast cancer in female (HCC)    s/p chemo and radiation    Diabetes (HCC)    Ductal carcinoma in situ of breast    Exposure to medical diagnostic radiation    High blood pressure    High cholesterol    Osteoarthritis     Personal history of antineoplastic chemotherapy      Past Surgical History:   Procedure Laterality Date    Chemotherapy Left 1990    6 months    Lumpectomy left Left 1990    breast cancer with chemo/radiation    Mastectomy left  12/2020    IDC, DCIS    Needle biopsy left Left 1990    Other surgical history  05/16/2025    PCI x 2    Radiation left  1990    1 1/2 months      Social History:    Social History     Socioeconomic History    Marital status:    Tobacco Use    Smoking status: Never    Smokeless tobacco: Never   Vaping Use    Vaping status: Never Used   Substance and Sexual Activity    Alcohol use: No     Alcohol/week: 0.0 standard drinks of alcohol    Drug use: No   Other Topics Concern    Caffeine Concern No    Exercise Yes    Seat Belt Yes    Special Diet No    Stress Concern No    Weight Concern Yes   Social History Narrative    ** Merged History Encounter **                EXAM:   /60 (BP Location: Left arm, Patient Position: Sitting, Cuff Size: adult)   Pulse 84   Temp 98.1 °F (36.7 °C) (Temporal)   Ht 5' (1.524 m)   Wt 174 lb 9.6 oz (79.2 kg)   SpO2 96%   BMI 34.10 kg/m²   GENERAL: A&O well developed, well nourished,in no apparent distress  SKIN: no rashes,no suspicious lesions  HEENT: atraumatic  NECK: supple, no jvd, no thyromegaly, no cervical lad  LUNGS: clear to auscultation bilateraly, no c/w/r  CARDIO: RRR without g/m/r  GI: softly distended, mild tenderness in epigastric region and RUQ. No rebound or guarding. Normal BS throughout  NEURO: CN 2-12 grossly intact  PSYCH: pleasant  EXTREMITIES: no cyanosis, clubbing or edema      ASSESSMENT AND PLAN:   # DOWNS - preceded her PCI and continues. She needs pulmonary evaluation. I have ordered PFTs and referred her to pulm.  # Abdominal gassiness and GERD - I suspect it is due to so many medication changes. I suggested she stop metformin but she prefers not to. I discussed proper way to take omeprazole and ordered US abdomen.   #  Dysuria - UA is normal today.   # CADz s/p PCI to the ostial right coronary artery on 5/16/2025 - Cardiology recommends dual antiplatelet therapy including aspirin and Brilinta 90 mg twice daily, high intensity statin, and cardiac rehab phase 2 referral.  # Bilateral Knee Pain 2/2 OA: improving a bit since getting injections. Cont HEP. Tramadol was too strong. She does not want to use meloxicam.   # HTN associated with DM, white coat syndrome: controlled on norvasc and irbesartan. Home machine is accurate.   # Left Breast Cancer, s/p left mastectomy: previous left breast carcinoma 1990, status post lumpectomy/RT/6 months of chemo/5 years of AI.  Recurrent left breast carcinoma diagnosed after abnormal screening mammogram 10/2020. - Started anastrozole January 2021, continue until January, 2026    # HLP associated with DM: at goal on atorvastatin.   # Type 2 Diabetes in Obese, BMI 32: A1C is near goal in 5/2025. If worsening in 3-6 months, consider adding jardiance.   - continue to reinforce importance of weight loss, carb controlled diet, and exercise.   - Cont metformin 500 mg once daily. Higher dose causes abdominal pain.   - DM Eye Exam - done on 4/10/2025 by Adam Mason. No DM retinopathy in either eye  - Foot Exam: done 2025   - LDL: see above  - BP: see above  - micro alb:creat done 2025  - Depression Screen: done 2025   # GERD: has been told she has duodenal erythema on past EGDs in [place]. Cont PPI.   # Osteopenia of lumbar spine - low FRAX per DEXA in 2023. educated on dietary calcium/vit D3, weight bearing exercises, fall prevention.   # Health Maintenance: medicare supervisit on February, 2025  Colon Cancer Screening: Cont to decline c-scope, FIT or cologuard.   Breast Cancer Screening: continue yearly mammogram.   Bone Health: see above  Vaccines: Up to date on pneumovac 23 and prevnar 13. TDAP 2017. Advised on shingrix, rsv, flu, and covid.   Medical POA: Brother, William Pratt  Hepatitis C Screen:  AB neg 2019       The patient indicates understanding of these issues and agrees to the plan.  The patient is asked to return in 6 months for chronic health issues   Angelica Austin MD

## 2025-07-01 ENCOUNTER — HOSPITAL ENCOUNTER (OUTPATIENT)
Dept: MAMMOGRAPHY | Age: 75
Discharge: HOME | End: 2025-07-01
Attending: INTERNAL MEDICINE
Payer: MEDICARE

## 2025-07-01 DIAGNOSIS — C50.912 INVASIVE DUCTAL CARCINOMA OF BREAST, FEMALE, LEFT (HCC): ICD-10-CM

## 2025-07-01 DIAGNOSIS — Z12.31 ENCOUNTER FOR SCREENING MAMMOGRAM FOR MALIGNANT NEOPLASM OF BREAST: ICD-10-CM

## 2025-07-01 PROCEDURE — 77061 BREAST TOMOSYNTHESIS UNI: CPT | Performed by: INTERNAL MEDICINE

## 2025-07-01 PROCEDURE — 77065 DX MAMMO INCL CAD UNI: CPT | Performed by: INTERNAL MEDICINE

## 2025-07-02 ENCOUNTER — RT VISIT (OUTPATIENT)
Dept: RESPIRATORY THERAPY | Facility: HOSPITAL | Age: 75
End: 2025-07-02
Attending: INTERNAL MEDICINE
Payer: MEDICARE

## 2025-07-02 DIAGNOSIS — R06.09 DOE (DYSPNEA ON EXERTION): ICD-10-CM

## 2025-07-02 DIAGNOSIS — I25.118 CORONARY ARTERY DISEASE OF NATIVE ARTERY OF NATIVE HEART WITH STABLE ANGINA PECTORIS: ICD-10-CM

## 2025-07-02 PROCEDURE — 94729 DIFFUSING CAPACITY: CPT | Performed by: INTERNAL MEDICINE

## 2025-07-02 PROCEDURE — 94010 BREATHING CAPACITY TEST: CPT | Performed by: INTERNAL MEDICINE

## 2025-07-02 PROCEDURE — 94726 PLETHYSMOGRAPHY LUNG VOLUMES: CPT | Performed by: INTERNAL MEDICINE

## 2025-07-08 NOTE — PROCEDURES
Patient is a 75-year-old female being assessed with a diagnosis of coronary artery disease.    Results    FEV1 1.66 L normal at -0.29  FVC 2.08 L normal at -0.41  Ratio was normal at 80%  MVV is within the normal range 95% of predicted  Flow-volume loop with some reproducible flattening of the expiratory limb.    Total lung capacity 4.04 L normal at -0.40  Residual volume 1.91 L normal at 0.25    Diffusion capacity is moderately reduced -2.58 (63% of predicted)  When corrected for alveolar volume and it improves into the mild range -1.91.(72% of predicted)    Impression---- there is no evidence of airways obstruction or restrictive defect.  There is a moderate reduction in diffusion capacity that corrects into the mild range when alveolar volume is considered.  Clinical correlation suggested to assess for component evolving ILD, pulmonary vascular disease, anemia.    There is a mild but reproducible flattening of the expiratory limb of the flow-volume loop compatible with possible variable intrathoracic obstruction with clinical correlation suggested.    When compared to study from 12/22/2016, spirometry and lung volumes are essentially unchanged.  Diffusion capacity when corrected has decreased (62% of predicted--corrects to 101% of predicted).  Flow volume loop was normal at that time

## 2025-07-17 ENCOUNTER — HOSPITAL ENCOUNTER (OUTPATIENT)
Dept: CT IMAGING | Age: 75
Discharge: HOME | End: 2025-07-17
Attending: INTERNAL MEDICINE
Payer: MEDICARE

## 2025-07-17 DIAGNOSIS — R94.2 DECREASED DIFFUSION CAPACITY: ICD-10-CM

## 2025-07-17 DIAGNOSIS — R94.2 ABNORMAL PFTS: ICD-10-CM

## 2025-07-17 PROCEDURE — 71250 CT THORAX DX C-: CPT | Performed by: INTERNAL MEDICINE

## 2025-07-21 ENCOUNTER — OFFICE VISIT (OUTPATIENT)
Facility: CLINIC | Age: 75
End: 2025-07-21
Payer: MEDICARE

## 2025-07-21 VITALS
DIASTOLIC BLOOD PRESSURE: 72 MMHG | HEIGHT: 60 IN | HEART RATE: 56 BPM | OXYGEN SATURATION: 98 % | BODY MASS INDEX: 33.96 KG/M2 | TEMPERATURE: 97 F | RESPIRATION RATE: 18 BRPM | SYSTOLIC BLOOD PRESSURE: 124 MMHG | WEIGHT: 173 LBS

## 2025-07-21 DIAGNOSIS — J45.990 EXERCISE-INDUCED ASTHMA (HCC): ICD-10-CM

## 2025-07-21 DIAGNOSIS — E11.9 TYPE 2 DIABETES MELLITUS WITHOUT COMPLICATION, WITHOUT LONG-TERM CURRENT USE OF INSULIN (HCC): ICD-10-CM

## 2025-07-21 DIAGNOSIS — R06.09 DOE (DYSPNEA ON EXERTION): Primary | ICD-10-CM

## 2025-07-21 PROCEDURE — 3008F BODY MASS INDEX DOCD: CPT | Performed by: INTERNAL MEDICINE

## 2025-07-21 PROCEDURE — 3078F DIAST BP <80 MM HG: CPT | Performed by: INTERNAL MEDICINE

## 2025-07-21 PROCEDURE — 1159F MED LIST DOCD IN RCRD: CPT | Performed by: INTERNAL MEDICINE

## 2025-07-21 PROCEDURE — 1160F RVW MEDS BY RX/DR IN RCRD: CPT | Performed by: INTERNAL MEDICINE

## 2025-07-21 PROCEDURE — 3074F SYST BP LT 130 MM HG: CPT | Performed by: INTERNAL MEDICINE

## 2025-07-21 PROCEDURE — 99204 OFFICE O/P NEW MOD 45 MIN: CPT | Performed by: INTERNAL MEDICINE

## 2025-07-21 RX ORDER — ALBUTEROL SULFATE 90 UG/1
2 INHALANT RESPIRATORY (INHALATION) EVERY 6 HOURS PRN
Qty: 1 EACH | Refills: 3 | Status: SHIPPED | OUTPATIENT
Start: 2025-07-21

## 2025-07-21 NOTE — PATIENT INSTRUCTIONS
Perform incentive spirometry every 4 hours as possible  Continue cardiac rehab  Albuterol HFA MDI 2 puffs 4 times daily as needed prior to exercise  Advised about weight loss      Follow-up around November 2025    Tu Jefferson MD

## 2025-07-21 NOTE — PROGRESS NOTES
The following individual(s) verbally consented to be recorded using ambient AI listening technology and understand that they can each withdraw their consent to this listening technology at any point by asking the clinician to turn off or pause the recording:    Patient name: Javi Pratt         Pulmonary/Critical Care/Sleep Medicine    Consult Note     PCP: Angelica Austin MD   Phone: [phone]   Fax: [phone]     Ref Provider: Angelica Austin     Reason for Consultation:  Shortness of breath     History of Present Illness:  History of Present Illness  Javi Pratt is a 75 year old female with coronary artery disease who presents with shortness of breath.    She has been experiencing shortness of breath for over six months, initially occurring during exertion but now affecting her even while talking. Initially, the shortness of breath would resolve after a while, but it has become more persistent. Her exercise tolerance has improved slightly over the past 15 days, allowing her to walk half a block without significant difficulty, whereas previously she could not walk even a block.    She underwent a stress test, echocardiogram, CT scan, and angiogram, which led to the placement of a stent. Despite these interventions, she continues to experience shortness of breath. Her hemoglobin level is around 9 g/dL.    Her past medical history includes a left mastectomy in 1990, followed by radiation therapy, and another mastectomy in 2020. She also has a history of hypercholesterolemia and arthritis. She denies any history of kidney, liver, thyroid, lung, heart, or sinus diseases.    Family history is significant for diabetes in her mother and blood cancer in her father. Her sister is on dialysis due to diabetes.    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 She experiences back pain,  which has improved slightly, and has difficulty sleeping. No nasal congestion, sore throat, swallowing difficulties, choking, cough, wheezing, abdominal pain, diarrhea, or constipation.         The patient  has no pets      History:  Past Medical History[1]  Past Surgical History[2]  Family History[3]   reports that she has never smoked. She has never used smokeless tobacco. She reports that she does not drink alcohol and does not use drugs.    Allergies:  Allergies[4]    Medications:  Current Hospital Medications[5]         Review of Systems:   A comprehensive 10 point review of systems was completed.  Pertinent positives and negatives noted in the the HPI.    Review of Systems   Constitutional:  Negative for fatigue, fever and unexpected weight change.   HENT:  Negative for congestion, mouth sores, nosebleeds, postnasal drip, rhinorrhea, sore throat and trouble swallowing.    Eyes:  Negative for visual disturbance.   Respiratory:  Positive for shortness of breath. Negative for apnea, cough, choking, chest tightness and wheezing.    Cardiovascular:  Positive for chest pain. Negative for palpitations and leg swelling.   Gastrointestinal:  Negative for abdominal pain, constipation, diarrhea, nausea and vomiting.   Genitourinary:  Negative for difficulty urinating.   Musculoskeletal:  Positive for back pain. Negative for arthralgias, gait problem and myalgias.   Neurological:  Negative for dizziness, weakness and headaches.   Psychiatric/Behavioral:  Positive for sleep disturbance.          Vitals:    07/21/25 1139   BP: 124/72   Pulse: 56   Resp: 18   Temp: 97.1 °F (36.2 °C)      SpO2: 98 %  Ht Readings from Last 1 Encounters:   07/21/25 5' (1.524 m)     Wt Readings from Last 1 Encounters:   07/21/25 173 lb (78.5 kg)     Body mass index is 33.79 kg/m².     Physical Exam  Constitutional:       General: She is not in acute distress.     Appearance: Normal appearance. She is obese. She is not ill-appearing or  diaphoretic.   HENT:      Head: Normocephalic and atraumatic.      Nose: Nose normal. No congestion or rhinorrhea.      Comments:       Mouth/Throat:      Mouth: Mucous membranes are moist.      Pharynx: Oropharynx is clear. No oropharyngeal exudate or posterior oropharyngeal erythema.      Comments: Mallampati class IV palate   Eyes:      Extraocular Movements: Extraocular movements intact.      Pupils: Pupils are equal, round, and reactive to light.   Cardiovascular:      Rate and Rhythm: Normal rate.      Pulses: Normal pulses.      Heart sounds: Normal heart sounds. No murmur heard.  Pulmonary:      Effort: Pulmonary effort is normal. No respiratory distress.      Breath sounds: Normal breath sounds. No wheezing or rhonchi.   Chest:      Chest wall: No tenderness.   Abdominal:      General: Abdomen is flat. Bowel sounds are normal.      Palpations: Abdomen is soft.   Musculoskeletal:         General: Normal range of motion.   Skin:     General: Skin is warm.   Neurological:      General: No focal deficit present.      Mental Status: She is alert and oriented to person, place, and time.   Psychiatric:         Mood and Affect: Mood normal.         Behavior: Behavior normal.         Thought Content: Thought content normal.         Judgment: Judgment normal.                Labs:  Last BMP  Lab Results   Component Value Date    GLU 98 06/03/2025    BUN 13 06/03/2025    CREATSERUM 0.95 06/03/2025    BUNCREA 12.4 04/19/2021    ANIONGAP 12 06/03/2025    GFRAA 82 07/06/2022    GFRNAA 71 07/06/2022    CA 10.0 06/03/2025     06/03/2025    K 4.5 06/03/2025     06/03/2025    CO2 24.0 06/03/2025    OSMOCALC 282 06/03/2025      Last CBC  Lab Results   Component Value Date    WBC 9.9 06/03/2025    RBC 4.13 06/03/2025    HGB 11.1 (L) 06/03/2025    HCT 34.7 (L) 06/03/2025    MCV 84.0 06/03/2025    MCH 26.9 06/03/2025    MCHC 32.0 06/03/2025    RDW 15.9 06/03/2025    .0 06/03/2025      Last CMP  Lab Results    Component Value Date    GLU 98 06/03/2025    BUN 13 06/03/2025    BUNCREA 12.4 04/19/2021    CREATSERUM 0.95 06/03/2025    ANIONGAP 12 06/03/2025    GFR 73 01/11/2017    GFRNAA 71 07/06/2022    GFRAA 82 07/06/2022    CA 10.0 06/03/2025    OSMOCALC 282 06/03/2025    ALKPHO 126 05/07/2025    AST 21 06/03/2025    ALT 16 06/03/2025    BILT 0.5 05/07/2025    TP 7.8 05/07/2025    ALB 4.9 (H) 05/07/2025    GLOBULIN 2.9 05/07/2025     06/03/2025    K 4.5 06/03/2025     06/03/2025    CO2 24.0 06/03/2025      Last Thyroid Function  Lab Results   Component Value Date    TSH 3.750 01/11/2017        Imaging personally reviewed:  No results found.  PROCEDURE: CT CHEST HI RESOLUTION (CPT=71250)    INDICATIONS: DX-R94.2 Abnormal PFTs;DX-R94.2 Decreased diffusion capacity;    PATIENT STATED HISTORY: Abnormal pulmonary function tests, history of left mastectomy with chemotherapy and radiation.    COMPARISON: Chest CT 12/29/2020    TECHNIQUE:  CT imaging was performed with high resolution imaging obtained in prone and supine position. Supine images were obtained in inspiration and expiration. 3D reconstructed images were reviewed. Dose reduction techniques were used. Dose  information is transmitted to the ACR (American College of Radiology) NRDR (National Radiology Data Registry) which includes the Dose Index Registry.    FINDINGS:    PARENCHYMA: There is subpleural reticulation and mild honeycombing evident in the anterior left lung. This is in an expected location for radiation field and involves a very small amount of the anterior left upper lobe. Allowing for differences in  technique the imaging appearance is not changed since the prior study. Imaging findings would be consistent with radiation induced lung injury with fibrosis. There are no other typical findings of UIP or any other significant interstitial lung disease.  Expiration images do not demonstrate any evidence of significant air trapping.    AIR  TRAPPING: There is no significant air trapping detected on expiration images.    LARGE AIRWAY BRONCHIECTASIS: None.    PLEURA: Unremarkable.    MEDIASTINUM AND IDA: There is a small hiatal hernia. Right tracheobronchial angle lymph node series 8 image 74 measures 1.3 x 1 cm. In retrospect this is not significantly changed and is most likely reactive finding. There is a moderate hiatal hernia  evident. There is aortic atherosclerosis.    CARDIAC: There is severe coronary artery atherosclerosis.    OTHER:Negative.    Impression   CONCLUSION:      1. Subpleural fibrotic lung disease anterior left upper lobe is consistent with radiation-induced lung injury with fibrosis. This involves only a small volume of the left upper lobe and is not changed since prior chest CT.  2. There is no other evidence of significant interstitial lung disease.  3. Upper normal right paratracheal lymph node is probably reactive.  4. Moderate hiatal hernia.    Electronically Verified and Signed by Attending Radiologist: Az Brand MD 7/17/2025 5:16 PM       Complete PFT: 7/2/2025     Patient is a 75-year-old female being assessed with a diagnosis of coronary artery disease.     Results     FEV1 1.66 L normal at -0.29  FVC 2.08 L normal at -0.41  Ratio was normal at 80%  MVV is within the normal range 95% of predicted  Flow-volume loop with some reproducible flattening of the expiratory limb.     Total lung capacity 4.04 L normal at -0.40  Residual volume 1.91 L normal at 0.25     Diffusion capacity is moderately reduced -2.58 (63% of predicted)  When corrected for alveolar volume and it improves into the mild range -1.91.(72% of predicted)     Impression---- there is no evidence of airways obstruction or restrictive defect.  There is a moderate reduction in diffusion capacity that corrects into the mild range when alveolar volume is considered.  Clinical correlation suggested to assess for component evolving ILD, pulmonary vascular  disease, anemia.     There is a mild but reproducible flattening of the expiratory limb of the flow-volume loop compatible with possible variable intrathoracic obstruction with clinical correlation suggested.     When compared to study from 12/22/2016, spirometry and lung volumes are essentially unchanged.  Diffusion capacity when corrected has decreased (62% of predicted--corrects to 101% of predicted).  Flow volume loop was normal at that time         COPD assessment test (CAT) score: 12  (Symptom severity of COPD/ CAT score of< 10= low; 10-20= medium; 21-30= high;> 30= very high)    Asthma Control Test:   (In The Last 4 Weeks)     Asthma Control Test Score: 25 points out of 25 points      14 or less  Asthma is very poorly controlled    15-19  Asthma is not as controlled as it could be   20-25  Asthma is under control           Assessment:  Assessment & Plan  Shortness of breath  Chronic shortness of breath for over six months, worsened with exertion.  The patient reports improving dyspnea on exertion following cardiac rehab.  Differential includes anemia, deconditioning, and possible exercise-induced asthma. Echocardiogram shows normal heart function with ejection fraction of 65-70%. CT scan shows minimal scarring from past radiation therapy but no significant lung pathology. Pulmonary function test indicates mildly reduced diffusion capacity at 63%.  The patient reports knee arthritis and pain causing her to get limited mobility.  Chest x-ray shows basilar atelectasis.  Differential diagnosis of patient's dyspnea on exertion would include possible deconditioning with arthritis limiting mobility along with atelectasis versus exercise-induced asthma.    - Continue cardiac rehabilitation  - Prescribe albuterol HFA MDI 2 puffs 4 times daily as needed bronchodilator for potential exercise-induced asthma  -Perform incentive spirometry every 4 hours as possible  - Reassess symptoms of shortness of breath in four  months    Anemia  Hemoglobin level reported per patient to be around 9 g/dL.  Although CBC on 6/3/2025 shows a hemoglobin of 11.1 g suggesting anemia appears unlikely because of shortness of breath on exertion  - Discuss iron supplementation to improve hemoglobin levels over 2-3 months    Obesity  Body mass index is 33.79 kg/m², indicating obesity. Contributing to deconditioning and shortness of breath.  - Advised about weight loss    Arthritis  Chronic arthritis contributing to reduced mobility and deconditioning. History of knee problems limiting physical activity.  - May benefit from physical therapy or orthopedics evaluation    Hypercholesterolemia  -Continue current medications    History of left breast cancer with mastectomy and radiation  Left breast cancer treated with mastectomy and radiation in 1990. Minimal scarring noted on CT scan from past radiation therapy.         Follow up:  11/2025     Tobacco Use: Low Risk  (7/21/2025)    Patient History     Smoking Tobacco Use: Never     Smokeless Tobacco Use: Never     Passive Exposure: Not on file        Thank You for allowing me to participate in this patient's care     Tu Jefferson MD        Note to the patient: The 21st Century Cures Act makes medical notes like these available to patients in the interest of transparency. However, be advised that this is a medical document. It is intended as peer to peer communication. It is written in medical language and may contain abbreviations or verbiage that are unfamiliar. It may appear blunt or direct. Medical documents are intended to carry relevant information, facts as evident, and clinical opinion of the practitioner.      Disclaimer: Components of this note were documented using voice recognition system and are subject to errors not corrected at proofreading. Contact the author of this note for any clarifications         [1]   Past Medical History:   Breast CA (HCC)    left breast    Breast cancer in female  (HCC)    s/p chemo and radiation    Diabetes (HCC)    Ductal carcinoma in situ of breast    Exposure to medical diagnostic radiation    High blood pressure    High cholesterol    Osteoarthritis    Personal history of antineoplastic chemotherapy   [2]   Past Surgical History:  Procedure Laterality Date    Chemotherapy Left 1990    6 months    Lumpectomy left Left 1990    breast cancer with chemo/radiation    Mastectomy left  12/2020    IDC, DCIS    Needle biopsy left Left 1990    Other surgical history  05/16/2025    PCI x 2    Radiation left  1990    1 1/2 months   [3]   Family History  Problem Relation Age of Onset    Cancer Father 70        \"Blood cancer\"    Diabetes Mother         with ESRD.    Diabetes Brother     Breast Cancer Self         49    DCIS Self    [4] No Known Allergies  [5] No current facility-administered medications for this visit.

## 2025-07-22 ENCOUNTER — HOSPITAL ENCOUNTER (OUTPATIENT)
Dept: ULTRASOUND IMAGING | Age: 75
Discharge: HOME | End: 2025-07-22
Attending: INTERNAL MEDICINE
Payer: MEDICARE

## 2025-07-22 DIAGNOSIS — R14.0 ABDOMINAL BLOATING: ICD-10-CM

## 2025-07-22 PROCEDURE — 76700 US EXAM ABDOM COMPLETE: CPT | Performed by: INTERNAL MEDICINE

## 2025-07-31 ENCOUNTER — TELEPHONE (OUTPATIENT)
Dept: INTERNAL MEDICINE CLINIC | Facility: CLINIC | Age: 75
End: 2025-07-31

## 2025-07-31 DIAGNOSIS — R10.13 EPIGASTRIC PAIN: Primary | ICD-10-CM

## 2025-08-04 ENCOUNTER — TELEMEDICINE (OUTPATIENT)
Dept: INTERNAL MEDICINE CLINIC | Facility: CLINIC | Age: 75
End: 2025-08-04

## 2025-08-04 VITALS — SYSTOLIC BLOOD PRESSURE: 128 MMHG | WEIGHT: 173 LBS | DIASTOLIC BLOOD PRESSURE: 64 MMHG | BODY MASS INDEX: 34 KG/M2

## 2025-08-04 DIAGNOSIS — E66.9 TYPE 2 DIABETES MELLITUS WITH OBESITY (HCC): ICD-10-CM

## 2025-08-04 DIAGNOSIS — I15.2 HYPERTENSION ASSOCIATED WITH DIABETES (HCC): ICD-10-CM

## 2025-08-04 DIAGNOSIS — E78.5 HYPERLIPIDEMIA ASSOCIATED WITH TYPE 2 DIABETES MELLITUS (HCC): ICD-10-CM

## 2025-08-04 DIAGNOSIS — M17.10 PRIMARY LOCALIZED OSTEOARTHRITIS OF KNEE: Primary | ICD-10-CM

## 2025-08-04 DIAGNOSIS — E11.69 HYPERLIPIDEMIA ASSOCIATED WITH TYPE 2 DIABETES MELLITUS (HCC): ICD-10-CM

## 2025-08-04 DIAGNOSIS — E11.59 HYPERTENSION ASSOCIATED WITH DIABETES (HCC): ICD-10-CM

## 2025-08-04 DIAGNOSIS — E11.69 TYPE 2 DIABETES MELLITUS WITH OBESITY (HCC): ICD-10-CM

## 2025-08-04 PROBLEM — J45.990 EXERCISE-INDUCED ASTHMA (HCC): Status: RESOLVED | Noted: 2025-07-21 | Resolved: 2025-08-04

## 2025-08-04 PROBLEM — M54.50 CHRONIC MIDLINE LOW BACK PAIN WITHOUT SCIATICA: Status: RESOLVED | Noted: 2018-09-17 | Resolved: 2025-08-04

## 2025-08-04 PROBLEM — G89.29 CHRONIC MIDLINE LOW BACK PAIN WITHOUT SCIATICA: Status: RESOLVED | Noted: 2018-09-17 | Resolved: 2025-08-04

## 2025-08-04 PROBLEM — E11.9 TYPE 2 DIABETES MELLITUS WITHOUT COMPLICATION, WITHOUT LONG-TERM CURRENT USE OF INSULIN (HCC): Status: RESOLVED | Noted: 2017-09-13 | Resolved: 2025-08-04

## 2025-08-04 RX ORDER — VONOPRAZAN FUMARATE 13.36 MG/1
10 TABLET ORAL DAILY
Qty: 30 TABLET | Refills: 3 | Status: SHIPPED | OUTPATIENT
Start: 2025-08-04

## 2025-08-05 ENCOUNTER — LAB ENCOUNTER (OUTPATIENT)
Dept: LAB | Age: 75
End: 2025-08-05
Attending: INTERNAL MEDICINE

## 2025-08-05 DIAGNOSIS — D64.9 NORMOCYTIC ANEMIA: ICD-10-CM

## 2025-08-05 LAB
BASOPHILS # BLD AUTO: 0.03 X10(3) UL (ref 0–0.2)
BASOPHILS NFR BLD AUTO: 0.4 %
DEPRECATED HBV CORE AB SER IA-ACNC: 30 NG/ML (ref 50–306)
EOSINOPHIL # BLD AUTO: 0.27 X10(3) UL (ref 0–0.7)
EOSINOPHIL NFR BLD AUTO: 3.2 %
ERYTHROCYTE [DISTWIDTH] IN BLOOD BY AUTOMATED COUNT: 16.3 %
HCT VFR BLD AUTO: 32.9 % (ref 35–48)
HGB BLD-MCNC: 10.6 G/DL (ref 12–16)
IMM GRANULOCYTES # BLD AUTO: 0.03 X10(3) UL (ref 0–1)
IMM GRANULOCYTES NFR BLD: 0.4 %
LYMPHOCYTES # BLD AUTO: 2.29 X10(3) UL (ref 1–4)
LYMPHOCYTES NFR BLD AUTO: 27 %
MCH RBC QN AUTO: 27 PG (ref 26–34)
MCHC RBC AUTO-ENTMCNC: 32.2 G/DL (ref 31–37)
MCV RBC AUTO: 83.9 FL (ref 80–100)
MONOCYTES # BLD AUTO: 0.62 X10(3) UL (ref 0.1–1)
MONOCYTES NFR BLD AUTO: 7.3 %
NEUTROPHILS # BLD AUTO: 5.23 X10 (3) UL (ref 1.5–7.7)
NEUTROPHILS # BLD AUTO: 5.23 X10(3) UL (ref 1.5–7.7)
NEUTROPHILS NFR BLD AUTO: 61.7 %
PLATELET # BLD AUTO: 303 10(3)UL (ref 150–450)
RBC # BLD AUTO: 3.92 X10(6)UL (ref 3.8–5.3)
WBC # BLD AUTO: 8.5 X10(3) UL (ref 4–11)

## 2025-08-05 PROCEDURE — 85025 COMPLETE CBC W/AUTO DIFF WBC: CPT

## 2025-08-05 PROCEDURE — 36415 COLL VENOUS BLD VENIPUNCTURE: CPT

## 2025-08-05 PROCEDURE — 82728 ASSAY OF FERRITIN: CPT

## 2025-08-06 PROBLEM — D50.9 IRON DEFICIENCY ANEMIA: Status: ACTIVE | Noted: 2025-08-06

## 2025-08-12 ENCOUNTER — TELEPHONE (OUTPATIENT)
Dept: INTERNAL MEDICINE CLINIC | Facility: CLINIC | Age: 75
End: 2025-08-12

## 2025-08-12 DIAGNOSIS — D50.0 IRON DEFICIENCY ANEMIA DUE TO CHRONIC BLOOD LOSS: ICD-10-CM

## 2025-08-12 DIAGNOSIS — D64.9 NORMOCYTIC ANEMIA: ICD-10-CM

## 2025-08-27 ENCOUNTER — NURSE ONLY (OUTPATIENT)
Dept: LAB | Age: 75
End: 2025-08-27
Attending: INTERNAL MEDICINE

## 2025-08-27 DIAGNOSIS — R10.13 EPIGASTRIC PAIN: ICD-10-CM

## 2025-08-27 PROCEDURE — 83013 H PYLORI (C-13) BREATH: CPT

## 2025-08-28 LAB — H PYLORI BREATH TEST: NEGATIVE

## (undated) DIAGNOSIS — N64.4 BREAST PAIN: Primary | ICD-10-CM

## (undated) DEVICE — STERILE POLYISOPRENE POWDER-FREE SURGICAL GLOVES: Brand: PROTEXIS

## (undated) DEVICE — STANDARD HYPODERMIC NEEDLE,POLYPROPYLENE HUB: Brand: MONOJECT

## (undated) DEVICE — UNDERPAD 23X36 LIGHT ASBORB

## (undated) DEVICE — HEMOCLIP HORIZON MED 002200

## (undated) DEVICE — KENDALL SCD EXPRESS SLEEVES, KNEE LENGTH, MEDIUM: Brand: KENDALL SCD

## (undated) DEVICE — STERILE HOOK LOCK LATEX FREE ELASTIC BANDAGE 6INX5YD: Brand: HOOK LOCK™

## (undated) DEVICE — SHEATH, GUIDE, SAVI SCOUT®: Brand: SAVI SCOUT®

## (undated) DEVICE — PROVE COVER: Brand: UNBRANDED

## (undated) DEVICE — CLEAR MONOFILAMENT (POLYDIOXANONE), ABSORBABLE SURGICAL SUTURE: Brand: PDS

## (undated) DEVICE — GAUZE SPONGES,12 PLY: Brand: CURITY

## (undated) DEVICE — SUPER SPONGES,MEDIUM: Brand: KERLIX

## (undated) DEVICE — SYRINGE 5ML LL TIP

## (undated) DEVICE — 3M™ STERI-STRIP™ REINFORCED ADHESIVE SKIN CLOSURES, R1548, 1 IN X 5 IN (25 MM X 125 MM), 4 STRIPS/ENVELOPE: Brand: 3M™ STERI-STRIP™

## (undated) DEVICE — DRAIN SILICONE FLAT 10MM

## (undated) DEVICE — 3M™ STERI-DRAPE™ INSTRUMENT POUCH 1018: Brand: STERI-DRAPE™

## (undated) DEVICE — BRA SURGICAL ELIZABETH PINK L

## (undated) DEVICE — DRAPE PACK CHEST & U BAR

## (undated) DEVICE — HEMOCLIP HORIZON SM 001200

## (undated) DEVICE — CASED DISP BIPOLAR CORD

## (undated) DEVICE — SUTURE MONOCRYL 4-0 PS-2

## (undated) DEVICE — SOL  .9 1000ML BTL

## (undated) DEVICE — TOWEL OR BLU 16X26 STRL

## (undated) DEVICE — BREAST-HERNIA-PORT CDS-LF: Brand: MEDLINE INDUSTRIES, INC.

## (undated) DEVICE — #15 STERILE STAINLESS BLADE: Brand: STERILE STAINLESS BLADES

## (undated) DEVICE — DRAIN RELIAVAC 100CC

## (undated) DEVICE — SUTURE SILK 2-0 FS

## (undated) DEVICE — SUTURE VICRYL 3-0 SH

## (undated) DEVICE — LIGHT HANDLE

## (undated) DEVICE — CAUTERY BLADE 2IN INS E1455

## (undated) DEVICE — DRAPE,TAPE STRIPS,STERILE: Brand: MEDLINE

## (undated) NOTE — LETTER
03/05/20        Javi Pratt  [address] [place] [id number]      Dear Mary Gongora,    Our records indicate that you have outstanding lab work and or testing that was ordered for you and has not yet been completed:      XR KNEE ROUT

## (undated) NOTE — LETTER
[facility]     [] NEW APPLICANT  [address] [place] [address]  [x] RENEWAL            Persons with Disabilities Certification for Parking Placard  *This form is valid for three months from your physician's signature date for a Temporary Placard and six months for a Permanent Placard.    NOTE TO ALL DISABILITY LICENSE PLATE OWNERS:  If you have a disability license plate, you MUST complete the form and renew your placard.    DIRECTIONS: Both sides of this document must be signed and completed fully. All fields are required.   Applicants complete Part 1. If the applicant is a MINOR, then Parent/Guardian(s) MUST also complete Part 2. The applicant's medical professional MUSTcomplete Part 3. If the applicant is applying for meter-exempt parking, his/her medical professional MUST also complete Part 4.    PART 1:   Applicant Information (MUST have a valid [place] 's license and/or ID card)  I hereby certify  that I meet the definition of a person with a disability as provided in [address] [place] [address] 5/1-159.1, and I certify  that my physical condition entitles me to the issuance of a Persons with Disabilities Parking Placard. By affixing my signature below, I understand that the parking placard may not be used unless I am the  or passenger of the vehicle.     *If a  , please provide a copy of your  showing proof of service.     Disability Parking Placard # (   Full Name of Person with Disability (If minor, complete Part 2 also)    Javi Pratt  Male/Female  female  Date of Birth   2/2/1950    Valid [place] ’s License or ID Card # of Applicant                                                                                                  [address] [address]    Mailing Address if Different from Above   Telephone Number  [phone] (home)  Email Address   [email or web address]  [place]? Yes/No  No      Signature of Person with Disability Today’s Date  8/28/2024     PART 2:   For Parent or Legal Guardian (MUST have a valid 's license and/or ID card)  I hereby certify that the above applicant is a minor and I have primary responsibility for his/her transportation. By affixing my signature below, I understand that the disability placard is issued to the person with the disability and may not be used unless I am transporting the disabled person in the vehicle.     Name of Parent or Legal Guardian   Relationship to Person with Disability   Valid [place] ’s License or ID Card #          [place] Address Apt/Unit# City State Zip   Telephone Number Email Address   Signature of Parent or Legal Guardian Today’s Date  8/28/2024     Warning: Any misuse of the disability parking placard/plates or making a false application may result in the revocation of the placard, a 12-month suspension or revocation of your drivers license, and a fine of up to $1,000.    Temporary Disabled Parking Placard Applications -- May be taken to any [facility] facility or mailed in.  Permanent Disables Parking Placard Applications -- MUST be mailed to the following address:  , Persons with Disabilities Placard Unit, [address] [place] [address], [address], [place] [address].    *If you have a permanent disability placard and would like a Persons with Disabilities License Plate, please visit your local  facility to apply. You will need your permanent placard number and current plate number or VARINDER.     Please complete Page 2 to ensure timely processing.    Printed by authority of the [facility]. July 2021 -- 1 -- VSD 62.28          Part 3: Medical Eligibility Standards and Medical Professionals Certification  As the medical professional(s) executing this document and verifying the nature of the applicant's disability, I understand that making a false representation of a person's disability for  the purposes of obtaining any type of a disabled parking placard may result in suspension or revocation of my license and a fine of up to $1,000. As a licensed physician, advanced practice nurse, optometrist, chiropractor or physician's assistant, I certify the applicant has a condition that constitutes him/her as a person with disabilities.   Length of Disability: (Check one)   []   Temporary Disability; the duration of this disability is _______ (maximum 6 months)  [x]   Permanent Disability  []   Meter-Exempt Disability (Must complete and sign Part 4 also.)  Check all that apply (MUST check at least one):  []  Is restricted by a lung disease to such a degree that the person’s forced (respiratory) expiratory volume (FEV) for 1 second, when measured by spirometry, is less than 1 liter.  []   Uses a portable oxygen device.  []   Has a Class III or Class IV cardiac condition according to the standards set by the American Heart Association.  [x]   Cannot walk without the use of or assistance from a wheelchair, a walker, a crutch, a brace, a prosthetic device or another person.  [x]   Is severely limited in the ability to walk due to an arthritic, neurological, oncological or orthopedic condition.  [x]   Cannot walk 200 feet without stopping to rest because of one of the above five conditions.  Check all that apply: (MUST check at least one diagnosis):  []Amputation of extremity(s)_________________ [] Arthritis of the _______  []Spina Bifida     [x]Osteoarthritis of both knees_   []Multiple Sclerosis    [] Chronic Pain due to ___________________  []Quadriplegia/Paraplegia   [] Legally Blind with limited mobility  [] Cerebral Palsy  [] Other Diagnosis: _________________________________________________________________    If none of the above conditions apply, list the medical condition that impacts the person’s mobility.     Medical Professional’s Printed Name*   Angelica Austin MD Specialty  Internal medicine   Office  Address   [facility], [address]  Counts include 234 beds at the [facility] [address]  Dept: [phone]  Dept Fax: [phone]   Medical Professional’s Signature   State Professional License Number (NOT NPI#)  [id number] Today’s Date                  8/28/2024    Signature of Collaborating/Supervising Physician (if signed above by resident/assistant) Supervising State Professional License Number             PART 4: Medical Eligibility for Meter-Exempt Parking  The meter-exempt parking certification must be completed only when the applicant qualifies. To qualify, the applicant MUST have a VALID  [place] 's license, have an ambulatory disability described in Part 3, and also have one of the following conditions listed below.  Economic need is not a consideration for meter-exempt parking    The applicant is eligible for meter-exempt parking as provided by statue due to the following PERMANENT medical condition or disability:    Check all that apply:  [] Cannot manage, manipulate, or insert coins, or obtain tickets in parking meters/ticket machines due to lack of fine motor control of BOTH hands.  [] Cannot reach above his/her head to a height of 42 inches from the ground due to a lack of finger, hand or upper-extremity strength or mobility.  [] Cannot approach a parking meter due to his/her use of a wheelchair or other device for mobility.  [] Cannot walk more than 20 feet due to an orthopedic, neurological, cardiovascular, or lung condition in which the degree of debilitation is so severe that it almost completely impedes the ability to walk.  [] Missing a hand(s) or arm(s) or has permanently lost the use of a hand or arm.  [] Patient is under 18 years of age and incapable of driving.     Medical Professional’s Signature State Professional License Number (NOT NPI#)   Today’s Date                  8/28/2024    Signature of Collaborating/Supervising Physician (if signed above by  resident/assistant) Supervising State Professional License Number     FOR  OFFICE USE ONLY     Parking Placard Number:  Expiration Date:   Issued By: Issued Date:

## (undated) NOTE — LETTER
Patient Name: Yessenia Car  YOB: 1950          MRN number:  [id number]  Date:  8/14/2019  Referring Physician:  Ese Pinedo    Progress Summary  Pt has attended 5, cancelled 0, and no shown 0 visits in Physical Therapy.       Subjecti Active shoulder flex 160 without pain for overhead reach  --> in progress  SLS balance 5 sec for improved safety and independence with gait on uneven surfaces. --> not met    Plan: Pt has completed 5/8 visits.   Pt would like to continue with HEP (I) as she

## (undated) NOTE — MR AVS SNAPSHOT
[place]  [address] [facility] [address]  [address] [facility] [address] [phone]               Thank you for choosing us for your health care visit with Ugo Dukes MD.  We are glad to serve you and happy to provide you with this summ chair/bench and a hand held shower head while bathing/showering. BEDROOM:  ? Place light switches within reach of your bed and a night light between the bedroom and bathroom. ? Get up slowly from lying down or sitting if you get dizzy. ?  Keep a working (Approximate)    Assoc Dx:  Exertional chest pain [R07.9]                 Follow-up Instructions     Return in about 1 year (around 1/23/2018) for medicare supervisit.       Scheduling Instructions     Monday January 23, 2017     Cardiology:  CARD TREADMILL walking, light jogging, cycling, swimming, etc.) for a goal of at least 150 minutes per week. Moderation of alcohol consumption Men: limit to <= 2 drinks* per day. Women and lighter weight persons: limit to <= 1 drink* per day.          [address] [place] [address]

## (undated) NOTE — LETTER
03/18/19        Javi Pratt  [address] [facility]      Dear Moses Ramirez,    Our records indicate that you have outstanding lab work and or testing that was ordered for you and has not yet been completed: Fasting lab work ord

## (undated) NOTE — LETTER
[facility]     [x] NEW APPLICANT  [address]   *If your valid placard was lost/stolen/damaged, use replacement form , available online at [employer] or visit your [place] [address]  [] RENEWAL            Persons with Disabilities Certification for Parking Placard  *This form is valid for three months from your physician's signature date for a Temporary Placard and six months for a Permanent Placard.    NOTE TO ALL DISABILITY LICENSE PLATE OWNERS:  If you have a disability license plate, you MUST complete the form and renew your placard.    DIRECTIONS: Both sides of this document must be signed and completed fully. All fields are required.   Applicants complete Part 1. If the applicant is a MINOR, then Parent/Guardian(s) MUST also complete Part 2. The applicant's medical professional MUSTcomplete Part 3. If the applicant is applying for meter-exempt parking, his/her medical professional MUST also complete Part 4.    PART 1:   Applicant Information (MUST have a valid [place] 's license and/or ID card)  I hereby certify  that I meet the definition of a person with a disability as provided in [address] [place] [address] 5/1-159.1, and I certify  that my physical condition entitles me to the issuance of a Persons with Disabilities Parking Placard. By affixing my signature below, I understand that the parking placard may not be used unless I am the  or passenger of the vehicle.     *If a  , please provide a copy of your  showing proof of service.     Disability Parking Placard # (if any)    Full Name of Person with Disability (If minor, complete Part 2 also)    Javi Pratt  Male/Female  female  Date of Birth   2/2/1950    Valid [place] ’s License or ID Card # of Applicant                                                                                                  [place] Address  [address]  [facility] [address]    Mailing Address if Different from Above   Telephone Number  [phone] (home)  Email Address   [email or web address]  [place]? Yes/No  No     Signature of Person with Disability Today’s Date  2/28/2024     PART 2:   For Parent or Legal Guardian (MUST have a valid 's license and/or ID card)  I hereby certify that the above applicant is a minor and I have primary responsibility for his/her transportation. By affixing my signature below, I understand that the disability placard is issued to the person with the disability and may not be used unless I am transporting the disabled person in the vehicle.     Name of Parent or Legal Guardian   Relationship to Person with Disability   Valid [place] ’s License or ID Card #          [place] Address Apt/Unit# City State Zip   Telephone Number Email Address   Signature of Parent or Legal Guardian Today’s Date  2/28/2024     Warning: Any misuse of the disability parking placard/plates or making a false application may result in the revocation of the placard, a 12-month suspension or revocation of your drivers license, and a fine of up to $1,000.    Temporary Disabled Parking Placard Applications -- May be taken to any  facility or mailed in.  Permanent Disables Parking Placard Applications -- MUST be mailed to the following address:  , Persons with Disabilities Placard Unit, [address] [place] [address], [address], [place] [address].    *If you have a permanent disability placard and would like a Persons with Disabilities License Plate, please visit your local  facility to apply. You will need your permanent placard number and current plate number or [employer].     Please complete Page 2 to ensure timely processing.    Printed by authority of the [facility]. July 2021 -- 1 -- VSD 62.28          Part 3: Medical Eligibility Standards and Medical Professionals Certification  As the  medical professional(s) executing this document and verifying the nature of the applicant's disability, I understand that making a false representation of a person's disability for the purposes of obtaining any type of a disabled parking placard may result in suspension or revocation of my license and a fine of up to $1,000. As a licensed physician, advanced practice nurse, optometrist, chiropractor or physician's assistant, I certify the applicant has a condition that constitutes him/her as a person with disabilities.   Length of Disability: (Check one)   []   Temporary Disability; the duration of this disability is _____  ________________ (maximum 6 months)  []   Permanent Disability  []   Meter-Exempt Disability (Must complete and sign Part 4 also.)  Check all that apply (MUST check at least one):  []  Is restricted by a lung disease to such a degree that the person’s forced (respiratory) expiratory volume (FEV) for 1 second, when measured by spirometry, is less than 1 liter.  []   Uses a portable oxygen device.  []   Has a Class III or Class IV cardiac condition according to the standards set by the American Heart Association.  [x]   Cannot walk without the use of or assistance from a wheelchair, a walker, a crutch, a brace, a prosthetic device or another person.  [x]   Is severely limited in the ability to walk due to an arthritic, neurological, oncological or orthopedic condition.  [x]   Cannot walk 200 feet without stopping to rest because of one of the above five conditions.  Check all that apply: (MUST check at least one diagnosis):  []Amputation of extremity(s)_________________ [] Arthritis of the _____  []Spina Bifida     []Osteoarthritis of both knees and low back_   []Multiple Sclerosis    [] Chronic Pain due to ___________________  []Quadriplegia/Paraplegia   [] Legally Blind with limited mobility  [] Cerebral Palsy  [] Other Diagnosis:  _________________________________________________________________    If none of the above conditions apply, list the medical condition that impacts the person’s mobility.     Medical Professional’s Printed Name*   Angelica Austin MD Specialty  Internal medicine   Office Address   [facility], [address]  [facility] [address]  Dept: [phone]  Dept Fax: [phone]   Medical Professional’s Signature   State Professional License Number (NOT NPI#)  [id number] Today’s Date                  2/28/2024    Signature of Collaborating/Supervising Physician (if signed above by resident/assistant) Supervising State Professional License Number             PART 4: Medical Eligibility for Meter-Exempt Parking  The meter-exempt parking certification must be completed only when the applicant qualifies. To qualify, the applicant MUST have a VALID  [place] 's license, have an ambulatory disability described in Part 3, and also have one of the following conditions listed below.  Economic need is not a consideration for meter-exempt parking    The applicant is eligible for meter-exempt parking as provided by statue due to the following PERMANENT medical condition or disability:    Check all that apply:  [] Cannot manage, manipulate, or insert coins, or obtain tickets in parking meters/ticket machines due to lack of fine motor control of BOTH hands.  [] Cannot reach above his/her head to a height of 42 inches from the ground due to a lack of finger, hand or upper-extremity strength or mobility.  [] Cannot approach a parking meter due to his/her use of a wheelchair or other device for mobility.  [] Cannot walk more than 20 feet due to an orthopedic, neurological, cardiovascular, or lung condition in which the degree of debilitation is so severe that it almost completely impedes the ability to walk.  [] Missing a hand(s) or arm(s) or has permanently lost the use of a hand or  arm.  [] Patient is under 18 years of age and incapable of driving.     Medical Professional’s Signature State Professional License Number (NOT NPI#)   Today’s Date                  2/28/2024    Signature of Collaborating/Supervising Physician (if signed above by resident/assistant) Supervising State Professional License Number     FOR  OFFICE USE ONLY     Parking Placard Number:  Expiration Date:   Issued By: Issued Date:

## (undated) NOTE — LETTER
January 30, 2020    Catracho Caban  [address] [facility]    Dear Eduardo Dawkins: It was a pleasure speaking with you over the phone recently.  To follow up, I wanted to send you some contact information to utilize when you have a